# Patient Record
Sex: FEMALE | Race: WHITE | NOT HISPANIC OR LATINO | Employment: OTHER | RURAL
[De-identification: names, ages, dates, MRNs, and addresses within clinical notes are randomized per-mention and may not be internally consistent; named-entity substitution may affect disease eponyms.]

---

## 2017-01-20 ENCOUNTER — OFFICE VISIT (OUTPATIENT)
Dept: FAMILY MEDICINE CLINIC | Facility: CLINIC | Age: 67
End: 2017-01-20

## 2017-01-20 VITALS
HEIGHT: 66 IN | DIASTOLIC BLOOD PRESSURE: 90 MMHG | HEART RATE: 87 BPM | OXYGEN SATURATION: 98 % | BODY MASS INDEX: 25.65 KG/M2 | SYSTOLIC BLOOD PRESSURE: 155 MMHG | WEIGHT: 159.6 LBS | TEMPERATURE: 99.6 F

## 2017-01-20 DIAGNOSIS — J06.9 ACUTE URI: Primary | ICD-10-CM

## 2017-01-20 PROCEDURE — 99213 OFFICE O/P EST LOW 20 MIN: CPT | Performed by: FAMILY MEDICINE

## 2017-01-20 PROCEDURE — 96372 THER/PROPH/DIAG INJ SC/IM: CPT | Performed by: FAMILY MEDICINE

## 2017-01-20 RX ORDER — ALBUTEROL SULFATE 90 UG/1
2 AEROSOL, METERED RESPIRATORY (INHALATION) EVERY 4 HOURS PRN
Qty: 3.7 G | Refills: 2 | Status: SHIPPED | OUTPATIENT
Start: 2017-01-20 | End: 2018-03-18 | Stop reason: SDUPTHER

## 2017-01-20 RX ORDER — FLUTICASONE PROPIONATE 50 MCG
2 SPRAY, SUSPENSION (ML) NASAL DAILY
COMMUNITY
End: 2017-06-19 | Stop reason: SDUPTHER

## 2017-01-20 RX ORDER — PRAVASTATIN SODIUM 40 MG
TABLET ORAL
COMMUNITY
Start: 2017-01-11 | End: 2017-06-19 | Stop reason: SDUPTHER

## 2017-01-20 RX ORDER — ALENDRONATE SODIUM 70 MG/1
TABLET ORAL
COMMUNITY
Start: 2017-01-11 | End: 2018-03-29

## 2017-01-20 RX ORDER — LEVOFLOXACIN 500 MG/1
500 TABLET, FILM COATED ORAL DAILY
Qty: 7 TABLET | Refills: 0 | Status: SHIPPED | OUTPATIENT
Start: 2017-01-20 | End: 2017-01-27

## 2017-01-20 RX ORDER — ASPIRIN 81 MG/1
81 TABLET ORAL DAILY
COMMUNITY
End: 2018-10-17

## 2017-01-20 RX ORDER — LEVOTHYROXINE SODIUM 0.05 MG/1
TABLET ORAL
COMMUNITY
Start: 2017-01-11 | End: 2017-06-19 | Stop reason: SDUPTHER

## 2017-01-20 RX ORDER — LOSARTAN POTASSIUM 100 MG/1
TABLET ORAL
COMMUNITY
Start: 2017-01-11 | End: 2017-06-19 | Stop reason: SDUPTHER

## 2017-01-20 RX ORDER — METHYLPREDNISOLONE ACETATE 40 MG/ML
20 INJECTION, SUSPENSION INTRA-ARTICULAR; INTRALESIONAL; INTRAMUSCULAR; SOFT TISSUE ONCE
Status: COMPLETED | OUTPATIENT
Start: 2017-01-20 | End: 2017-01-20

## 2017-01-20 RX ORDER — AMLODIPINE BESYLATE 10 MG/1
TABLET ORAL
COMMUNITY
Start: 2017-01-11 | End: 2017-06-19 | Stop reason: SDUPTHER

## 2017-01-20 RX ORDER — CETIRIZINE HYDROCHLORIDE 10 MG/1
10 TABLET ORAL DAILY
COMMUNITY
End: 2017-10-11

## 2017-01-20 RX ORDER — INDAPAMIDE 2.5 MG/1
TABLET, FILM COATED ORAL
COMMUNITY
Start: 2017-01-11 | End: 2017-06-19 | Stop reason: SDUPTHER

## 2017-01-20 RX ADMIN — METHYLPREDNISOLONE ACETATE 20 MG: 40 INJECTION, SUSPENSION INTRA-ARTICULAR; INTRALESIONAL; INTRAMUSCULAR; SOFT TISSUE at 09:33

## 2017-01-20 NOTE — PROGRESS NOTES
"Naina Nigel    1950    Chief Complaint   Patient presents with   • URI       Vitals:    01/20/17 0912   BP: 155/90   Pulse: 87   Temp: 99.6 °F (37.6 °C)   SpO2: 98%   Weight: 159 lb 9.6 oz (72.4 kg)   Height: 66\" (167.6 cm)       URI    This is a new problem. The current episode started in the past 7 days. The problem has been gradually worsening. The maximum temperature recorded prior to her arrival was 100.4 - 100.9 F. Associated symptoms include congestion, coughing and sinus pain. She has tried acetaminophen and antihistamine for the symptoms. The treatment provided no relief.       Review of Systems   HENT: Positive for congestion.    Respiratory: Positive for cough.        Past Medical History   Diagnosis Date   • Hyperlipidemia    • Hypertension    • Hypothyroidism          Current Outpatient Prescriptions:   •  alendronate (FOSAMAX) 70 MG tablet, , Disp: , Rfl:   •  amLODIPine (NORVASC) 10 MG tablet, , Disp: , Rfl:   •  aspirin 81 MG EC tablet, Take 81 mg by mouth Daily., Disp: , Rfl:   •  cetirizine (zyrTEC) 10 MG tablet, Take 10 mg by mouth Daily., Disp: , Rfl:   •  Cholecalciferol (VITAMIN D3) 5000 UNITS capsule capsule, Take 5,000 Units by mouth Daily., Disp: , Rfl:   •  fluticasone (FLONASE) 50 MCG/ACT nasal spray, 2 sprays into each nostril Daily., Disp: , Rfl:   •  indapamide (LOZOL) 2.5 MG tablet, , Disp: , Rfl:   •  levothyroxine (SYNTHROID, LEVOTHROID) 50 MCG tablet, , Disp: , Rfl:   •  losartan (COZAAR) 100 MG tablet, , Disp: , Rfl:   •  Multiple Vitamins-Minerals (MULTIVITAMIN ADULTS 50+ PO), Take  by mouth., Disp: , Rfl:   •  pravastatin (PRAVACHOL) 40 MG tablet, , Disp: , Rfl:   •  levoFLOXacin (LEVAQUIN) 500 MG tablet, Take 1 tablet by mouth Daily for 7 days., Disp: 7 tablet, Rfl: 0    Current Facility-Administered Medications:   •  methylPREDNISolone acetate (DEPO-medrol) injection 20 mg, 20 mg, Intramuscular, Once, Juan Luis Zafar MD    Allergies   Allergen Reactions   • " "Penicillins    • Prednisone        There is no problem list on file for this patient.      Social History     Social History   • Marital status:      Spouse name: N/A   • Number of children: N/A   • Years of education: N/A     Social History Main Topics   • Smoking status: Never Smoker   • Smokeless tobacco: Never Used   • Alcohol use Yes      Comment: occ   • Drug use: No   • Sexual activity: Defer     Other Topics Concern   • None     Social History Narrative       Past Surgical History   Procedure Laterality Date   • [place]     [facility] [place]   • Tubal abdominal ligation     • Eye surgery         Physical Exam   Constitutional: She appears well-developed and well-nourished.   HENT:   Right Ear: External ear normal.   Left Ear: External ear normal.   Mouth/Throat: Oropharynx is clear and moist.   Eyes: Conjunctivae are normal.   Cardiovascular: Normal rate and regular rhythm.    Pulmonary/Chest: Effort normal. She has no wheezes.   Lymphadenopathy:     She has no cervical adenopathy.   Neurological: She is alert.   Psychiatric: She has a normal mood and affect.   Vitals reviewed.      Vitals:    01/20/17 0912   BP: 155/90   Pulse: 87   Temp: 99.6 °F (37.6 °C)   SpO2: 98%   Weight: 159 lb 9.6 oz (72.4 kg)   Height: 66\" (167.6 cm)       Naina was seen today for uri.    Diagnoses and all orders for this visit:    Acute URI  -     methylPREDNISolone acetate (DEPO-medrol) injection 20 mg; Inject 0.5 mL into the shoulder, thigh, or buttocks 1 (One) Time.    Other orders  -     levoFLOXacin (LEVAQUIN) 500 MG tablet; Take 1 tablet by mouth Daily for 7 days.        Problem List Items Addressed This Visit     None      Visit Diagnoses     Acute URI    -  Primary    Relevant Medications    methylPREDNISolone acetate (DEPO-medrol) injection 20 mg (Start on 1/20/2017 10:15 AM)    levoFLOXacin (LEVAQUIN) 500 MG tablet       plan finish Z-Jeffery from ambulatory clinic-Floxin for 7 days-albuterol " inhaler                            Juan Luis Zafar MD  1/20/2017

## 2017-01-20 NOTE — MR AVS SNAPSHOT
Naina Manning   1/20/2017 9:15 AM   Office Visit    Dept Phone:  [phone]   Encounter #:  [id number]    Provider:  Juan Luis Zafar MD   Department:  [facility]                Your Full Care Plan              Today's Medication Changes          These changes are accurate as of: 1/20/17  9:33 AM.  If you have any questions, ask your nurse or doctor.               New Medication(s)Ordered:     levoFLOXacin 500 MG tablet   Commonly known as:  LEVAQUIN   Take 1 tablet by mouth Daily for 7 days.   Started by:  Juan Luis Zafar MD            Where to Get Your Medications      These medications were sent to [facility] [address] [place] [address] [phone] [facility] [phone] [address] [address]     Phone:  [phone]     levoFLOXacin 500 MG tablet                  Your Updated Medication List          This list is accurate as of: 1/20/17  9:33 AM.  Always use your most recent med list.                alendronate 70 MG tablet   Commonly known as:  FOSAMAX       amLODIPine 10 MG tablet   Commonly known as:  NORVASC       aspirin 81 MG EC tablet       cetirizine 10 MG tablet   Commonly known as:  zyrTEC       fluticasone 50 MCG/ACT nasal spray   Commonly known as:  FLONASE       indapamide 2.5 MG tablet   Commonly known as:  LOZOL       levoFLOXacin 500 MG tablet   Commonly known as:  LEVAQUIN   Take 1 tablet by mouth Daily for 7 days.       levothyroxine 50 MCG tablet   Commonly known as:  SYNTHROID, LEVOTHROID       losartan 100 MG tablet   Commonly known as:  COZAAR       MULTIVITAMIN ADULTS 50+ PO       pravastatin 40 MG tablet   Commonly known as:  PRAVACHOL       vitamin D3 5000 UNITS capsule capsule               You Were Diagnosed With        Codes Comments    Acute URI    -  Primary ICD-10-CM: J06.9  ICD-9-CM: 465.9       Medications to be Given to You by a Medical Professional     Due       "Frequency    1/20/2017 methylPREDNISolone acetate (DEPO-medrol) injection 20 mg  Once      Instructions     None    Patient Instructions History      Upcoming Appointments     Visit Type Date Time Department    OFFICE VISIT 1/20/2017  9:15 AM [facility] [place]      MSIhart Signup     Our records indicate that you have declined [facility] MSIhart signup. If you would like to sign up for [employer], please email [email or web address] or call [phone] to obtain an activation code.             Other Info from Your Visit           Allergies     Penicillins      Prednisone        Reason for Visit     URI           Vital Signs     Blood Pressure Pulse Temperature Height Weight Oxygen Saturation    155/90 87 99.6 °F (37.6 °C) 66\" (167.6 cm) 159 lb 9.6 oz (72.4 kg) 98%    Body Mass Index Smoking Status                25.76 kg/m2 Never Smoker          Problems and Diagnoses Noted     Acute upper respiratory infection    -  Primary        "

## 2017-02-06 RX ORDER — DIPHENHYDRAMINE, LIDOCAINE, NYSTATIN
5 KIT ORAL 3 TIMES DAILY
Qty: 60 ML | Refills: 0 | Status: SHIPPED | OUTPATIENT
Start: 2017-02-06 | End: 2017-04-21 | Stop reason: SDUPTHER

## 2017-02-13 ENCOUNTER — OFFICE VISIT (OUTPATIENT)
Dept: FAMILY MEDICINE CLINIC | Facility: CLINIC | Age: 67
End: 2017-02-13

## 2017-02-13 VITALS
BODY MASS INDEX: 25.46 KG/M2 | HEART RATE: 81 BPM | TEMPERATURE: 98.7 F | SYSTOLIC BLOOD PRESSURE: 124 MMHG | OXYGEN SATURATION: 97 % | WEIGHT: 158.4 LBS | DIASTOLIC BLOOD PRESSURE: 80 MMHG | HEIGHT: 66 IN

## 2017-02-13 DIAGNOSIS — J06.9 ACUTE URI: Primary | ICD-10-CM

## 2017-02-13 DIAGNOSIS — R73.9 HYPERGLYCEMIA: ICD-10-CM

## 2017-02-13 DIAGNOSIS — J06.9 UPPER RESPIRATORY TRACT INFECTION, UNSPECIFIED TYPE: ICD-10-CM

## 2017-02-13 PROCEDURE — 99212 OFFICE O/P EST SF 10 MIN: CPT | Performed by: FAMILY MEDICINE

## 2017-02-13 NOTE — PROGRESS NOTES
"Naina Manning    1950    Chief Complaint   Patient presents with   • Thrush       Vitals:    02/13/17 1015   BP: 124/80   Pulse: 81   Temp: 98.7 °F (37.1 °C)   SpO2: 97%   Weight: 158 lb 6.4 oz (71.8 kg)   Height: 66\" (167.6 cm)       URI    This is a new problem. The current episode started 1 to 4 weeks ago. The problem has been gradually improving. There has been no fever. Associated symptoms include a sore throat. Treatments tried: Persistent thrush-antifungal treatment completed.       Review of Systems   HENT: Positive for sore throat.         Sore tongue, consistent with thrush       Past Medical History   Diagnosis Date   • Hyperlipidemia    • Hypertension    • Hypothyroidism          Current Outpatient Prescriptions:   •  albuterol (PROVENTIL HFA;VENTOLIN HFA) 108 (90 BASE) MCG/ACT inhaler, Inhale 2 puffs Every 4 (Four) Hours As Needed for wheezing., Disp: 3.7 g, Rfl: 2  •  alendronate (FOSAMAX) 70 MG tablet, , Disp: , Rfl:   •  amLODIPine (NORVASC) 10 MG tablet, , Disp: , Rfl:   •  aspirin 81 MG EC tablet, Take 81 mg by mouth Daily., Disp: , Rfl:   •  cetirizine (zyrTEC) 10 MG tablet, Take 10 mg by mouth Daily., Disp: , Rfl:   •  Cholecalciferol (VITAMIN D3) 5000 UNITS capsule capsule, Take 5,000 Units by mouth Daily., Disp: , Rfl:   •  fluticasone (FLONASE) 50 MCG/ACT nasal spray, 2 sprays into each nostril Daily., Disp: , Rfl:   •  indapamide (LOZOL) 2.5 MG tablet, , Disp: , Rfl:   •  levothyroxine (SYNTHROID, LEVOTHROID) 50 MCG tablet, , Disp: , Rfl:   •  losartan (COZAAR) 100 MG tablet, , Disp: , Rfl:   •  Multiple Vitamins-Minerals (MULTIVITAMIN ADULTS 50+ PO), Take  by mouth., Disp: , Rfl:   •  nystatin susp + lidocaine viscous (MAGIC MOUTHWASH) oral suspension, Swish and swallow 5 mL 3 (Three) Times a Day., Disp: 60 mL, Rfl: 0  •  pravastatin (PRAVACHOL) 40 MG tablet, , Disp: , Rfl:     Allergies   Allergen Reactions   • Sulfa Antibiotics Hives   • Penicillins    • Prednisone        There is no " "problem list on file for this patient.      Social History     Social History   • Marital status:      Spouse name: N/A   • Number of children: N/A   • Years of education: N/A     Social History Main Topics   • Smoking status: Never Smoker   • Smokeless tobacco: Never Used   • Alcohol use Yes      Comment: occ   • Drug use: No   • Sexual activity: Defer     Other Topics Concern   • None     Social History Narrative       Past Surgical History   Procedure Laterality Date   • Colonoscopy  10/04/2016     [facility] [place] KING   • Tubal abdominal ligation     • Eye surgery         Physical Exam   Constitutional: She appears well-developed and well-nourished.   HENT:   Right Ear: External ear normal.   Left Ear: External ear normal.   Tongue consistent with thrush with minimal cervical adenopathy on the right   Vitals reviewed.      Vitals:    02/13/17 1015   BP: 124/80   Pulse: 81   Temp: 98.7 °F (37.1 °C)   SpO2: 97%   Weight: 158 lb 6.4 oz (71.8 kg)   Height: 66\" (167.6 cm)       Naina was seen today for thrush.    Diagnoses and all orders for this visit:    Acute URI    Hyperglycemia  -     Basic Metabolic Panel  -     Hemoglobin A1c    Upper respiratory tract infection, unspecified type  -     CBC & Differential        Problem List Items Addressed This Visit     None      Visit Diagnoses     Acute URI    -  Primary    Hyperglycemia        Relevant Orders    Basic Metabolic Panel    Hemoglobin A1c    Upper respiratory tract infection, unspecified type        Relevant Orders    CBC & Differential              Plan Biotene mouthwash, hydration, stimulate salivation                        Juan Luis Zafar MD  2/13/2017  "

## 2017-02-14 LAB
BASOPHILS # BLD AUTO: 0.04 10*3/MM3 (ref 0–0.2)
BASOPHILS NFR BLD AUTO: 0.5 % (ref 0–2)
BUN SERPL-MCNC: 17 MG/DL (ref 5–21)
BUN/CREAT SERPL: 23.9 (ref 7–25)
CALCIUM SERPL-MCNC: 10.1 MG/DL (ref 8.4–10.4)
CHLORIDE SERPL-SCNC: 96 MMOL/L (ref 98–110)
CO2 SERPL-SCNC: 31 MMOL/L (ref 24–31)
CREAT SERPL-MCNC: 0.71 MG/DL (ref 0.5–1.4)
EOSINOPHIL # BLD AUTO: 0.19 10*3/MM3 (ref 0–0.7)
EOSINOPHIL NFR BLD AUTO: 2.2 % (ref 0–4)
ERYTHROCYTE [DISTWIDTH] IN BLOOD BY AUTOMATED COUNT: 13.3 % (ref 12–15)
GLUCOSE SERPL-MCNC: 101 MG/DL (ref 70–100)
HBA1C MFR BLD: 5.4 %
HCT VFR BLD AUTO: 45.5 % (ref 37–47)
HGB BLD-MCNC: 15.3 G/DL (ref 12–16)
IMM GRANULOCYTES # BLD: 0.02 10*3/MM3 (ref 0–0.03)
IMM GRANULOCYTES NFR BLD: 0.2 % (ref 0–5)
LYMPHOCYTES # BLD AUTO: 2.34 10*3/MM3 (ref 0.72–4.86)
LYMPHOCYTES NFR BLD AUTO: 26.5 % (ref 15–45)
MCH RBC QN AUTO: 29.8 PG (ref 28–32)
MCHC RBC AUTO-ENTMCNC: 33.6 G/DL (ref 33–36)
MCV RBC AUTO: 88.5 FL (ref 82–98)
MONOCYTES # BLD AUTO: 0.56 10*3/MM3 (ref 0.19–1.3)
MONOCYTES NFR BLD AUTO: 6.3 % (ref 4–12)
NEUTROPHILS # BLD AUTO: 5.68 10*3/MM3 (ref 1.87–8.4)
NEUTROPHILS NFR BLD AUTO: 64.3 % (ref 39–78)
PLATELET # BLD AUTO: 394 10*3/MM3 (ref 130–400)
POTASSIUM SERPL-SCNC: 4.7 MMOL/L (ref 3.5–5.3)
RBC # BLD AUTO: 5.14 10*6/MM3 (ref 4.2–5.4)
SODIUM SERPL-SCNC: 139 MMOL/L (ref 135–145)
WBC # BLD AUTO: 8.83 10*3/MM3 (ref 4.8–10.8)

## 2017-03-06 ENCOUNTER — OFFICE VISIT (OUTPATIENT)
Dept: FAMILY MEDICINE CLINIC | Facility: CLINIC | Age: 67
End: 2017-03-06

## 2017-03-06 VITALS
HEART RATE: 77 BPM | BODY MASS INDEX: 25.43 KG/M2 | HEIGHT: 66 IN | TEMPERATURE: 98.9 F | SYSTOLIC BLOOD PRESSURE: 140 MMHG | WEIGHT: 158.2 LBS | OXYGEN SATURATION: 99 % | DIASTOLIC BLOOD PRESSURE: 80 MMHG

## 2017-03-06 DIAGNOSIS — R10.13 EPIGASTRIC PAIN: Primary | ICD-10-CM

## 2017-03-06 LAB
BILIRUB BLD-MCNC: NEGATIVE MG/DL
CLARITY, POC: CLEAR
COLOR UR: YELLOW
GLUCOSE UR STRIP-MCNC: NEGATIVE MG/DL
KETONES UR QL: NEGATIVE
LEUKOCYTE EST, POC: NEGATIVE
NITRITE UR-MCNC: NEGATIVE MG/ML
PH UR: 6 [PH] (ref 5–8)
PROT UR STRIP-MCNC: NEGATIVE MG/DL
RBC # UR STRIP: NEGATIVE /UL
SP GR UR: 1.02 (ref 1–1.03)
UROBILINOGEN UR QL: NORMAL

## 2017-03-06 PROCEDURE — 81003 URINALYSIS AUTO W/O SCOPE: CPT | Performed by: FAMILY MEDICINE

## 2017-03-06 PROCEDURE — 99213 OFFICE O/P EST LOW 20 MIN: CPT | Performed by: FAMILY MEDICINE

## 2017-03-06 RX ORDER — OMEPRAZOLE 40 MG/1
40 CAPSULE, DELAYED RELEASE ORAL DAILY
Qty: 30 CAPSULE | Refills: 2 | Status: SHIPPED | OUTPATIENT
Start: 2017-03-06 | End: 2017-06-19 | Stop reason: SDUPTHER

## 2017-03-06 NOTE — PROGRESS NOTES
"Naina Nigel    1950    Chief Complaint   Patient presents with   • Abdominal Pain     Patient stated possible ulcer       Vitals:    03/06/17 0955   BP: 140/80   Pulse: 77   Temp: 98.9 °F (37.2 °C)   SpO2: 99%   Weight: 158 lb 3.2 oz (71.8 kg)   Height: 66\" (167.6 cm)       Abdominal Pain   This is a new problem. The current episode started 1 to 4 weeks ago. The onset quality is gradual. The problem occurs daily. The problem has been unchanged. The pain is located in the epigastric region. The pain is at a severity of 2/10. The pain is mild. The quality of the pain is burning. The abdominal pain does not radiate. Associated symptoms include belching. The pain is aggravated by eating. The pain is relieved by nothing. She has tried nothing for the symptoms.       Review of Systems   Cardiovascular: Negative for chest pain.   Gastrointestinal: Positive for abdominal pain.       Past Medical History   Diagnosis Date   • Hyperlipidemia    • Hypertension    • Hypothyroidism          Current Outpatient Prescriptions:   •  albuterol (PROVENTIL HFA;VENTOLIN HFA) 108 (90 BASE) MCG/ACT inhaler, Inhale 2 puffs Every 4 (Four) Hours As Needed for wheezing., Disp: 3.7 g, Rfl: 2  •  alendronate (FOSAMAX) 70 MG tablet, , Disp: , Rfl:   •  amLODIPine (NORVASC) 10 MG tablet, , Disp: , Rfl:   •  aspirin 81 MG EC tablet, Take 81 mg by mouth Daily., Disp: , Rfl:   •  cetirizine (zyrTEC) 10 MG tablet, Take 10 mg by mouth Daily., Disp: , Rfl:   •  Cholecalciferol (VITAMIN D3) 5000 UNITS capsule capsule, Take 5,000 Units by mouth Daily., Disp: , Rfl:   •  fluticasone (FLONASE) 50 MCG/ACT nasal spray, 2 sprays into each nostril Daily., Disp: , Rfl:   •  indapamide (LOZOL) 2.5 MG tablet, , Disp: , Rfl:   •  levothyroxine (SYNTHROID, LEVOTHROID) 50 MCG tablet, , Disp: , Rfl:   •  losartan (COZAAR) 100 MG tablet, , Disp: , Rfl:   •  Multiple Vitamins-Minerals (MULTIVITAMIN ADULTS 50+ PO), Take  by mouth., Disp: , Rfl:   •  nystatin susp + " "lidocaine viscous (MAGIC MOUTHWASH) oral suspension, Swish and swallow 5 mL 3 (Three) Times a Day., Disp: 60 mL, Rfl: 0  •  pravastatin (PRAVACHOL) 40 MG tablet, , Disp: , Rfl:     Allergies   Allergen Reactions   • Sulfa Antibiotics Hives   • Penicillins    • Prednisone        There is no problem list on file for this patient.      Social History     Social History   • Marital status:      Spouse name: N/A   • Number of children: N/A   • Years of education: N/A     Social History Main Topics   • Smoking status: Never Smoker   • Smokeless tobacco: Never Used   • Alcohol use Yes      Comment: occ   • Drug use: No   • Sexual activity: Defer     Other Topics Concern   • None     Social History Narrative       Past Surgical History   Procedure Laterality Date   • Colonoscopy  10/04/2016     [facility] [place] DR KING   • Tubal abdominal ligation     • Eye surgery         Physical Exam   Constitutional: She is oriented to person, place, and time. She appears well-developed and well-nourished.   Cardiovascular: Normal rate and regular rhythm.    Pulmonary/Chest: Effort normal and breath sounds normal.   Abdominal: Soft. Bowel sounds are normal. She exhibits no distension. There is no tenderness.   Neurological: She is alert and oriented to person, place, and time.   Psychiatric: She has a normal mood and affect. Her behavior is normal.   Vitals reviewed.      Vitals:    03/06/17 0955   BP: 140/80   Pulse: 77   Temp: 98.9 °F (37.2 °C)   SpO2: 99%   Weight: 158 lb 3.2 oz (71.8 kg)   Height: 66\" (167.6 cm)       Naina was seen today for abdominal pain.    Diagnoses and all orders for this visit:    Epigastric pain  -     POCT urinalysis dipstick, automated        Problem List Items Addressed This Visit     None      Visit Diagnoses     Epigastric pain    -  Primary    Relevant Orders    POCT urinalysis dipstick, automated (Completed)              Plan-omeprazole, Gaviscon-if not better in 3 weeks will need " investigation                    Juan Luis Zafar MD  3/6/2017

## 2017-03-21 ENCOUNTER — OFFICE VISIT (OUTPATIENT)
Dept: FAMILY MEDICINE CLINIC | Facility: CLINIC | Age: 67
End: 2017-03-21

## 2017-03-21 VITALS
SYSTOLIC BLOOD PRESSURE: 150 MMHG | DIASTOLIC BLOOD PRESSURE: 82 MMHG | BODY MASS INDEX: 25.94 KG/M2 | HEIGHT: 66 IN | WEIGHT: 161.4 LBS

## 2017-03-21 DIAGNOSIS — I10 ESSENTIAL HYPERTENSION: Primary | ICD-10-CM

## 2017-03-21 PROCEDURE — 99213 OFFICE O/P EST LOW 20 MIN: CPT | Performed by: FAMILY MEDICINE

## 2017-03-21 RX ORDER — CLONIDINE HYDROCHLORIDE 0.1 MG/1
TABLET ORAL
Qty: 90 TABLET | Refills: 3 | Status: SHIPPED | OUTPATIENT
Start: 2017-03-21 | End: 2017-05-25 | Stop reason: ALTCHOICE

## 2017-03-21 NOTE — PROGRESS NOTES
"Naina Manning    1950    Chief Complaint   Patient presents with   • Hypertension       Vitals:    03/21/17 1237   BP: 150/82   Weight: 161 lb 6.4 oz (73.2 kg)   Height: 66\" (167.6 cm)       Hypertension   This is a chronic problem. The current episode started more than 1 year ago. The problem has been gradually worsening since onset. The problem is uncontrolled. Pertinent negatives include no chest pain or shortness of breath. There are no associated agents to hypertension. Risk factors for coronary artery disease include dyslipidemia, post-menopausal state and family history. Past treatments include angiotensin blockers, calcium channel blockers and diuretics. The current treatment provides moderate improvement. There are no compliance problems.        Review of Systems   Respiratory: Negative for chest tightness and shortness of breath.    Cardiovascular: Negative for chest pain and leg swelling.       Past Medical History   Diagnosis Date   • Hyperlipidemia    • Hypertension    • Hypothyroidism          Current Outpatient Prescriptions:   •  albuterol (PROVENTIL HFA;VENTOLIN HFA) 108 (90 BASE) MCG/ACT inhaler, Inhale 2 puffs Every 4 (Four) Hours As Needed for wheezing., Disp: 3.7 g, Rfl: 2  •  alendronate (FOSAMAX) 70 MG tablet, , Disp: , Rfl:   •  amLODIPine (NORVASC) 10 MG tablet, , Disp: , Rfl:   •  aspirin 81 MG EC tablet, Take 81 mg by mouth Daily., Disp: , Rfl:   •  cetirizine (zyrTEC) 10 MG tablet, Take 10 mg by mouth Daily., Disp: , Rfl:   •  Cholecalciferol (VITAMIN D3) 5000 UNITS capsule capsule, Take 5,000 Units by mouth Daily., Disp: , Rfl:   •  fluticasone (FLONASE) 50 MCG/ACT nasal spray, 2 sprays into each nostril Daily., Disp: , Rfl:   •  indapamide (LOZOL) 2.5 MG tablet, , Disp: , Rfl:   •  levothyroxine (SYNTHROID, LEVOTHROID) 50 MCG tablet, , Disp: , Rfl:   •  losartan (COZAAR) 100 MG tablet, , Disp: , Rfl:   •  Multiple Vitamins-Minerals (MULTIVITAMIN ADULTS 50+ PO), Take  by mouth., " "Disp: , Rfl:   •  nystatin susp + lidocaine viscous (MAGIC MOUTHWASH) oral suspension, Swish and swallow 5 mL 3 (Three) Times a Day., Disp: 60 mL, Rfl: 0  •  omeprazole (priLOSEC) 40 MG capsule, Take 1 capsule by mouth Daily., Disp: 30 capsule, Rfl: 2  •  pravastatin (PRAVACHOL) 40 MG tablet, , Disp: , Rfl:   •  CloNIDine (CATAPRES) 0.1 MG tablet, 1 at bedtime for blood pressure, Disp: 90 tablet, Rfl: 3    Allergies   Allergen Reactions   • Sulfa Antibiotics Hives   • Penicillins    • Prednisone        There is no problem list on file for this patient.      Social History     Social History   • Marital status:      Spouse name: N/A   • Number of children: N/A   • Years of education: N/A     Social History Main Topics   • Smoking status: Never Smoker   • Smokeless tobacco: Never Used   • Alcohol use Yes      Comment: occ   • Drug use: No   • Sexual activity: Defer     Other Topics Concern   • None     Social History Narrative       Past Surgical History   Procedure Laterality Date   • Colonoscopy  10/04/2016     [facility] [place] - DR KING   • Tubal abdominal ligation     • Eye surgery         Physical Exam   Constitutional: She appears well-developed and well-nourished.   Cardiovascular: Normal rate and regular rhythm.    Pulmonary/Chest: Effort normal and breath sounds normal. She has no wheezes.   Neurological: She is alert.   Psychiatric: She has a normal mood and affect.   Vitals reviewed.      Vitals:    03/21/17 1237   BP: 150/82   Weight: 161 lb 6.4 oz (73.2 kg)   Height: 66\" (167.6 cm)       Naina was seen today for hypertension.    Diagnoses and all orders for this visit:    Essential hypertension    Other orders  -     CloNIDine (CATAPRES) 0.1 MG tablet; 1 at bedtime for blood pressure        Problem List Items Addressed This Visit     None      Visit Diagnoses     Essential hypertension    -  Primary    Relevant Medications    CloNIDine (CATAPRES) 0.1 MG tablet                Plan-add clonidine 0.1 to blood " pressure medications return 2 weeks for annual physical                      Juan Luis Zafar MD  3/21/2017

## 2017-04-03 ENCOUNTER — OFFICE VISIT (OUTPATIENT)
Dept: FAMILY MEDICINE CLINIC | Facility: CLINIC | Age: 67
End: 2017-04-03

## 2017-04-03 VITALS
WEIGHT: 162.2 LBS | DIASTOLIC BLOOD PRESSURE: 80 MMHG | OXYGEN SATURATION: 96 % | HEIGHT: 66 IN | TEMPERATURE: 99.1 F | SYSTOLIC BLOOD PRESSURE: 112 MMHG | HEART RATE: 77 BPM | BODY MASS INDEX: 26.07 KG/M2

## 2017-04-03 DIAGNOSIS — Z72.89 OTHER PROBLEMS RELATED TO LIFESTYLE: ICD-10-CM

## 2017-04-03 DIAGNOSIS — R53.83 FATIGUE, UNSPECIFIED TYPE: ICD-10-CM

## 2017-04-03 DIAGNOSIS — E78.2 MIXED HYPERLIPIDEMIA: ICD-10-CM

## 2017-04-03 DIAGNOSIS — E55.9 UNSPECIFIED VITAMIN D DEFICIENCY: ICD-10-CM

## 2017-04-03 DIAGNOSIS — Z12.31 SCREENING MAMMOGRAM, ENCOUNTER FOR: ICD-10-CM

## 2017-04-03 DIAGNOSIS — Z00.00 ANNUAL PHYSICAL EXAM: Primary | ICD-10-CM

## 2017-04-03 DIAGNOSIS — I10 ESSENTIAL HYPERTENSION: ICD-10-CM

## 2017-04-03 DIAGNOSIS — Z12.4 SCREENING FOR MALIGNANT NEOPLASM OF CERVIX: ICD-10-CM

## 2017-04-03 DIAGNOSIS — Z12.12 SCREENING FOR MALIGNANT NEOPLASM OF THE RECTUM: ICD-10-CM

## 2017-04-03 LAB
BILIRUB BLD-MCNC: NEGATIVE MG/DL
CLARITY, POC: CLEAR
COLOR UR: YELLOW
GLUCOSE UR STRIP-MCNC: NEGATIVE MG/DL
KETONES UR QL: NEGATIVE
LEUKOCYTE EST, POC: NEGATIVE
NITRITE UR-MCNC: NEGATIVE MG/ML
PH UR: 7 [PH] (ref 5–8)
PROT UR STRIP-MCNC: NEGATIVE MG/DL
RBC # UR STRIP: NEGATIVE /UL
SP GR UR: 1.01 (ref 1–1.03)
UROBILINOGEN UR QL: NORMAL

## 2017-04-03 PROCEDURE — G0402 INITIAL PREVENTIVE EXAM: HCPCS | Performed by: FAMILY MEDICINE

## 2017-04-03 PROCEDURE — 81003 URINALYSIS AUTO W/O SCOPE: CPT | Performed by: FAMILY MEDICINE

## 2017-04-03 NOTE — PATIENT INSTRUCTIONS
Exercising to Stay Healthy  Exercising regularly is important. It has many health benefits, such as:  · Improving your overall fitness, flexibility, and endurance.  · Increasing your bone density.  · Helping with weight control.  · Decreasing your body fat.  · Increasing your muscle strength.  · Reducing stress and tension.  · Improving your overall health.  In order to become healthy and stay healthy, it is recommended that you do moderate-intensity and vigorous-intensity exercise. You can tell that you are exercising at a moderate intensity if you have a higher heart rate and faster breathing, but you are still able to hold a conversation. You can tell that you are exercising at a vigorous intensity if you are breathing much harder and faster and cannot hold a conversation while exercising.  HOW OFTEN SHOULD I EXERCISE?  Choose an activity that you enjoy and set realistic goals. Your health care provider can help you to make an activity plan that works for you. Exercise regularly as directed by your health care provider. This may include:   · Doing resistance training twice each week, such as:    Push-ups.    Sit-ups.    Lifting weights.    Using resistance bands.  · Doing a given intensity of exercise for a given amount of time. Choose from these options:    150 minutes of moderate-intensity exercise every week.    75 minutes of vigorous-intensity exercise every week.    A mix of moderate-intensity and vigorous-intensity exercise every week.  Children, pregnant women, people who are out of shape, people who are overweight, and older adults may need to consult a health care provider for individual recommendations. If you have any sort of medical condition, be sure to consult your health care provider before starting a new exercise program.   WHAT ARE SOME EXERCISE IDEAS?  Some moderate-intensity exercise ideas include:   · Walking at a rate of 1 mile in 15  minutes.  · Biking.  · Hiking.  · Golfing.  · Dancing.  Some vigorous-intensity exercise ideas include:   · Walking at a rate of at least 4.5 miles per hour.  · Jogging or running at a rate of 5 miles per hour.  · Biking at a rate of at least 10 miles per hour.  · Lap swimming.  · Roller-skating or in-line skating.  · Cross-country skiing.  · Vigorous competitive sports, such as football, basketball, and soccer.  · Jumping rope.  · Aerobic dancing.  WHAT ARE SOME EVERYDAY ACTIVITIES THAT CAN HELP ME TO GET EXERCISE?  · Yard work, such as:    Pushing a .    Raking and bagging leaves.  · Washing and waxing your car.  · Pushing a stroller.  · Shoveling snow.  · Gardening.  · Washing windows or floors.  HOW CAN I BE MORE ACTIVE IN MY DAY-TO-DAY ACTIVITIES?  · Use the stairs instead of the elevator.  · Take a walk during your lunch break.  · If you drive, park your car farther away from work or school.  · If you take public transportation, get off one stop early and walk the rest of the way.  · Make all of your phone calls while standing up and walking around.  · Get up, stretch, and walk around every 30 minutes throughout the day.  WHAT GUIDELINES SHOULD I FOLLOW WHILE EXERCISING?  · Do not exercise so much that you hurt yourself, feel dizzy, or get very short of breath.  · Consult your health care provider before starting a new exercise program.  · Wear comfortable clothes and shoes with good support.  · Drink plenty of water while you exercise to prevent dehydration or heat stroke. Body water is lost during exercise and must be replaced.  · Work out until you breathe faster and your heart beats faster.     This information is not intended to replace advice given to you by your health care provider. Make sure you discuss any questions you have with your health care provider.     Document Released: 01/20/2012 Document Revised: 01/08/2016 Document Reviewed: 05/21/2015  [employer] Patient Education  ©201 [employer]    Medicare Wellness  Personal Prevention Plan of Service     Date of Office Visit:  2017  Encounter Provider:  Juan Luis Zafar MD  Place of Service:  [facility]  Patient Name: Naina Manning  :  1950    As part of the Medicare Wellness portion of your visit today, we are providing you with this personalized preventive plan of services (PPPS). This plan is based upon recommendations of the [place] Preventive Services Task Force (USPSTF) and the Advisory Committee on Immunization Practices (ACIP).    This lists the preventive care services that should be considered, and provides dates of when you are due. Items listed as completed are up-to-date and do not require any further intervention.    Health Maintenance   Topic Date Due   • HEPATITIS C SCREENING  2016   • MAMMOGRAM  2016   • ZOSTER VACCINE  2016   • LIPID PANEL  2017   • MEDICARE ANNUAL WELLNESS  2016   • PAP SMEAR  2016   • PNEUMOCOCCAL VACCINES (65+ LOW/MEDIUM RISK) (1 of 2 - PCV13) 01/10/2017   • TDAP/TD VACCINES (2 - Td) 2023   • COLONOSCOPY  10/04/2026   • INFLUENZA VACCINE  Completed       No orders of the defined types were placed in this encounter.      Return in 6 months (on 10/3/2017).

## 2017-04-03 NOTE — PROGRESS NOTES
QUICK REFERENCE INFORMATION:  The ABCs of the Annual Wellness Visit    [facility]    HEALTH RISK ASSESSMENT    1950    Recent Hospitalizations:  No recent hospitalization(s)..      Current Medical Providers:  Patient Care Team:  Juan Luis Zafar MD as PCP - General (Family Medicine)      Smoking Status:  History   Smoking Status   • Never Smoker   Smokeless Tobacco   • Never Used       Alcohol Consumption:  History   Alcohol Use   • Yes     Comment: occ       Depression Screen:   PHQ-9 Depression Screening 4/3/2017   Little interest or pleasure in doing things 0   Feeling down, depressed, or hopeless 0   Trouble falling or staying asleep, or sleeping too much 0   Feeling tired or having little energy 0   Poor appetite or overeating 0   Feeling bad about yourself - or that you are a failure or have let yourself or your family down 0   Trouble concentrating on things, such as reading the newspaper or watching television 0   Moving or speaking so slowly that other people could have noticed. Or the opposite - being so fidgety or restless that you have been moving around a lot more than usual 0   Thoughts that you would be better off dead, or of hurting yourself in some way 0   PHQ-9 Total Score 0   If you checked off any problems, how difficult have these problems made it for you to do your work, take care of things at home, or get along with other people? Not difficult at all       Health Habits and Functional and Cognitive Screening:  Functional & Cognitive Status 4/3/2017   Do you have difficulty preparing food and eating? No   Do you have difficulty bathing yourself? No   Do you have difficulty getting dressed? No   Do you have difficulty using the toilet? No   Do you have difficulty moving around from place to place? No   In the past year have you fallen or experienced a near fall? No   Do you need help using the phone?  No   Are you deaf or do you have serious difficulty hearing?  No   Do  "you need help with transportation? No   Do you need help shopping? No   Do you need help preparing meals?  No   Do you need help with housework?  No   Do you need help with laundry? No   Do you need help taking your medications? No   Do you need help managing money? No   Do you have difficulty concentrating, remembering or making decisions? -   -- The Timed Up and Go (TUG) Test (CDC Version)                  - Testing directions adhered to:                                  1) Patients wears regular footwear and may use walking aid(s) if    needed.                                  2) Patient to sit back in standard arm chair and identify a line 10 feet    away from patient on floor.                                  3) Test time begins at \"Go\" and stops when patient reseated in arm    chair.                    - Instructions read aloud (and demonstrated as applicable) to patient:                                      When I say \"Go,\" I want you to:                                    1) Stand up from the chair.                                  2) Walk to the line on the floor (10 feet away) at your normal pace.                                  3) Turn.                                  4) Walk back to the chair at your normal pace.                                  5) Sit down again.                    - Result: 3                  - Observations during test:Normal gait          Health Habits  Current Diet: Well Balanced Diet  Dental Exam: Up to date  Eye Exam: Up to date  Exercise (times per week): 5 times per week  Current Exercise Activities Include: Cardiovasular Workout on Exercise Equipment        Does the patient have evidence of cognitive impairment? No    Asprin use counseling?yes      Recent Lab Results:  CMP:  Lab Results   Component Value Date     (H) 02/13/2017    BUN 17 02/13/2017    CREATININE 0.71 02/13/2017    EGFRIFNONA 83 02/13/2017    EGFRIFAFRI 100 02/13/2017    BCR 23.9 02/13/2017     " 02/13/2017    K 4.7 02/13/2017    CO2 31.0 02/13/2017    CALCIUM 10.1 02/13/2017     Lipid Panel:     LDL:     HbA1c:  Lab Results   Component Value Date    HGBA1C 5.40 02/13/2017     Urine Microalbumin:     Visual Acuity:  No exam data present    Age-appropriate Screening Schedule:  Refer to the list below for future screening recommendations based on patient's age, sex and/or medical conditions. Orders for these recommended tests are listed in the plan section. The patient has been provided with a written plan.    Health Maintenance   Topic Date Due   • MAMMOGRAM  11/07/2016   • ZOSTER VACCINE  11/07/2016   • LIPID PANEL  01/20/2017   • PAP SMEAR  11/07/2016   • PNEUMOCOCCAL VACCINES (65+ LOW/MEDIUM RISK) (1 of 2 - PCV13) 01/10/2017   • TDAP/TD VACCINES (2 - Td) 07/16/2023   • COLONOSCOPY  10/04/2026   • INFLUENZA VACCINE  Completed        Subjective   History of Present Illness    Naina Manning is a 67 y.o. female an established patient presenting for a Welcome to Medicare Visit.     The following portions of the patient's history were reviewed and updated as appropriate: allergies, current medications, past family history, past medical history, past social history, past surgical history and problem list.    Outpatient Medications Prior to Visit   Medication Sig Dispense Refill   • albuterol (PROVENTIL HFA;VENTOLIN HFA) 108 (90 BASE) MCG/ACT inhaler Inhale 2 puffs Every 4 (Four) Hours As Needed for wheezing. 3.7 g 2   • alendronate (FOSAMAX) 70 MG tablet      • amLODIPine (NORVASC) 10 MG tablet      • aspirin 81 MG EC tablet Take 81 mg by mouth Daily.     • cetirizine (zyrTEC) 10 MG tablet Take 10 mg by mouth Daily.     • Cholecalciferol (VITAMIN D3) 5000 UNITS capsule capsule Take 5,000 Units by mouth Daily.     • CloNIDine (CATAPRES) 0.1 MG tablet 1 at bedtime for blood pressure 90 tablet 3   • fluticasone (FLONASE) 50 MCG/ACT nasal spray 2 sprays into each nostril Daily.     • indapamide (LOZOL) 2.5 MG tablet       • levothyroxine (SYNTHROID, LEVOTHROID) 50 MCG tablet      • losartan (COZAAR) 100 MG tablet      • Multiple Vitamins-Minerals (MULTIVITAMIN ADULTS 50+ PO) Take  by mouth.     • nystatin susp + lidocaine viscous (MAGIC MOUTHWASH) oral suspension Swish and swallow 5 mL 3 (Three) Times a Day. 60 mL 0   • omeprazole (priLOSEC) 40 MG capsule Take 1 capsule by mouth Daily. 30 capsule 2   • pravastatin (PRAVACHOL) 40 MG tablet        No facility-administered medications prior to visit.        There is no problem list on file for this patient.      Advanced Care Planning:  has an advanced directive - a copy HAS NOT been provided    Identification of Risk Factors:  Risk factors include: weight , unhealthy diet and cardiovascular risk.    Review of Systems   Constitutional: Negative.    HENT: Negative.    Eyes: Negative.    Respiratory: Negative for chest tightness and shortness of breath.    Cardiovascular: Negative for chest pain.   Gastrointestinal: Negative.         Colonoscopy 2016   Endocrine: Negative.    Musculoskeletal: Negative.    Skin: Negative.    Neurological: Negative.    Hematological: Negative.    Psychiatric/Behavioral: Negative.        Compared to one year ago, the patient feels her physical health is the same.  Compared to one year ago, the patient feels her mental health is the same.    Objective    Physical Exam   Constitutional: She is oriented to person, place, and time. She appears well-developed and well-nourished.   HENT:   Head: Normocephalic.   Right Ear: External ear normal.   Left Ear: External ear normal.   Mouth/Throat: Oropharynx is clear and moist.   Eyes: EOM are normal. Pupils are equal, round, and reactive to light.   Neck: Normal range of motion. Neck supple. No thyromegaly present.   Cardiovascular: Normal rate and regular rhythm.    Pulmonary/Chest: Effort normal and breath sounds normal. She has no wheezes. She has no rales.   Abdominal: Soft. Bowel sounds are normal.   Spleen and  "liver not felt   Genitourinary: Vagina normal and uterus normal.   Genitourinary Comments: Breasts no masses noted-pelvic uterus normal size no adnexal masses Pap smear taken   Musculoskeletal: Normal range of motion.   Lymphadenopathy:     She has no cervical adenopathy.   Neurological: She is alert and oriented to person, place, and time.   Skin: Skin is warm and dry.   Psychiatric: She has a normal mood and affect. Her behavior is normal. Thought content normal.   Vitals reviewed.      Vitals:    04/03/17 0948   BP: 112/80   Pulse: 77   Temp: 99.1 °F (37.3 °C)   SpO2: 96%   Weight: 162 lb 3.2 oz (73.6 kg)   Height: 66\" (167.6 cm)   PainSc: 0-No pain       Body mass index is 26.18 kg/(m^2).  Discussed the patient's BMI with her. The BMI is in the acceptable range.    Procedure   Procedures       Assessment/Plan   Patient Self-Management and Personalized Health Advice  The patient has been provided with information about: diet, exercise and weight management and preventive services including:   · Colorectal cancer screening, fecal occult blood test, Screening mammography, referral placed.    Visit Diagnoses:  No diagnosis found.    No orders of the defined types were placed in this encounter.      Outpatient Encounter Prescriptions as of 4/3/2017   Medication Sig Dispense Refill   • albuterol (PROVENTIL HFA;VENTOLIN HFA) 108 (90 BASE) MCG/ACT inhaler Inhale 2 puffs Every 4 (Four) Hours As Needed for wheezing. 3.7 g 2   • alendronate (FOSAMAX) 70 MG tablet      • amLODIPine (NORVASC) 10 MG tablet      • aspirin 81 MG EC tablet Take 81 mg by mouth Daily.     • cetirizine (zyrTEC) 10 MG tablet Take 10 mg by mouth Daily.     • Cholecalciferol (VITAMIN D3) 5000 UNITS capsule capsule Take 5,000 Units by mouth Daily.     • CloNIDine (CATAPRES) 0.1 MG tablet 1 at bedtime for blood pressure 90 tablet 3   • fluticasone (FLONASE) 50 MCG/ACT nasal spray 2 sprays into each nostril Daily.     • indapamide (LOZOL) 2.5 MG tablet   "    • levothyroxine (SYNTHROID, LEVOTHROID) 50 MCG tablet      • losartan (COZAAR) 100 MG tablet      • Multiple Vitamins-Minerals (MULTIVITAMIN ADULTS 50+ PO) Take  by mouth.     • nystatin susp + lidocaine viscous (MAGIC MOUTHWASH) oral suspension Swish and swallow 5 mL 3 (Three) Times a Day. 60 mL 0   • omeprazole (priLOSEC) 40 MG capsule Take 1 capsule by mouth Daily. 30 capsule 2   • pravastatin (PRAVACHOL) 40 MG tablet        No facility-administered encounter medications on file as of 4/3/2017.        Reviewed use of high risk medication in the elderly: yes  Reviewed for potential of harmful drug interactions in the elderly: yes    Follow Up:  No Follow-up on file.     An After Visit Summary and PPPS with all of these plans were given to the patient.   Plan above plus mammogram cardiology consult

## 2017-04-04 ENCOUNTER — TELEPHONE (OUTPATIENT)
Dept: FAMILY MEDICINE CLINIC | Facility: CLINIC | Age: 67
End: 2017-04-04

## 2017-04-04 LAB
25(OH)D3+25(OH)D2 SERPL-MCNC: 41.3 NG/ML (ref 30–100)
ALBUMIN SERPL-MCNC: 4.4 G/DL (ref 3.5–5)
ALBUMIN/GLOB SERPL: 1.4 G/DL (ref 1.1–2.5)
ALP SERPL-CCNC: 71 U/L (ref 24–120)
ALT SERPL-CCNC: 26 U/L (ref 0–54)
AST SERPL-CCNC: 22 U/L (ref 7–45)
BASOPHILS # BLD AUTO: 0.03 10*3/MM3 (ref 0–0.2)
BASOPHILS NFR BLD AUTO: 0.4 % (ref 0–2)
BILIRUB SERPL-MCNC: 0.6 MG/DL (ref 0.1–1)
BUN SERPL-MCNC: 16 MG/DL (ref 5–21)
BUN/CREAT SERPL: 23.2 (ref 7–25)
CALCIUM SERPL-MCNC: 9.8 MG/DL (ref 8.4–10.4)
CHLORIDE SERPL-SCNC: 97 MMOL/L (ref 98–110)
CHOLEST SERPL-MCNC: 165 MG/DL (ref 130–200)
CO2 SERPL-SCNC: 28 MMOL/L (ref 24–31)
CREAT SERPL-MCNC: 0.69 MG/DL (ref 0.5–1.4)
EOSINOPHIL # BLD AUTO: 0.12 10*3/MM3 (ref 0–0.7)
EOSINOPHIL NFR BLD AUTO: 1.8 % (ref 0–4)
ERYTHROCYTE [DISTWIDTH] IN BLOOD BY AUTOMATED COUNT: 13.6 % (ref 12–15)
GLOBULIN SER CALC-MCNC: 3.2 GM/DL
GLUCOSE SERPL-MCNC: 88 MG/DL (ref 70–100)
HCT VFR BLD AUTO: 42.5 % (ref 37–47)
HCV AB S/CO SERPL IA: <0.1 S/CO RATIO (ref 0–0.9)
HDLC SERPL-MCNC: 44 MG/DL
HGB BLD-MCNC: 13.8 G/DL (ref 12–16)
IMM GRANULOCYTES # BLD: 0.03 10*3/MM3 (ref 0–0.03)
IMM GRANULOCYTES NFR BLD: 0.4 % (ref 0–5)
LDLC SERPL CALC-MCNC: 102 MG/DL (ref 0–99)
LYMPHOCYTES # BLD AUTO: 1.93 10*3/MM3 (ref 0.72–4.86)
LYMPHOCYTES NFR BLD AUTO: 28.9 % (ref 15–45)
MCH RBC QN AUTO: 28.6 PG (ref 28–32)
MCHC RBC AUTO-ENTMCNC: 32.5 G/DL (ref 33–36)
MCV RBC AUTO: 88 FL (ref 82–98)
MONOCYTES # BLD AUTO: 0.51 10*3/MM3 (ref 0.19–1.3)
MONOCYTES NFR BLD AUTO: 7.6 % (ref 4–12)
NEUTROPHILS # BLD AUTO: 4.06 10*3/MM3 (ref 1.87–8.4)
NEUTROPHILS NFR BLD AUTO: 60.9 % (ref 39–78)
PLATELET # BLD AUTO: 397 10*3/MM3 (ref 130–400)
POTASSIUM SERPL-SCNC: 5 MMOL/L (ref 3.5–5.3)
PROT SERPL-MCNC: 7.6 G/DL (ref 6.3–8.7)
RBC # BLD AUTO: 4.83 10*6/MM3 (ref 4.2–5.4)
SODIUM SERPL-SCNC: 138 MMOL/L (ref 135–145)
T4 FREE SERPL-MCNC: 1.26 NG/DL (ref 0.78–2.19)
TRIGL SERPL-MCNC: 93 MG/DL (ref 0–149)
TSH SERPL DL<=0.005 MIU/L-ACNC: 1.3 MIU/ML (ref 0.47–4.68)
VLDLC SERPL CALC-MCNC: 18.6 MG/DL
WBC # BLD AUTO: 6.68 10*3/MM3 (ref 4.8–10.8)

## 2017-04-04 NOTE — TELEPHONE ENCOUNTER
----- Message from Juan Luis Zafar MD sent at 4/4/2017  8:02 AM CDT -----  Lab acceptable continue same

## 2017-04-05 LAB
CYTOLOGIST CVX/VAG CYTO: NORMAL
CYTOLOGY CVX/VAG DOC THIN PREP: NORMAL
DX ICD CODE: NORMAL
HIV 1 & 2 AB SER-IMP: NORMAL
OTHER STN SPEC: NORMAL
PATH REPORT.FINAL DX SPEC: NORMAL
STAT OF ADQ CVX/VAG CYTO-IMP: NORMAL

## 2017-04-12 RX ORDER — AZITHROMYCIN 500 MG/1
500 TABLET, FILM COATED ORAL DAILY
Qty: 3 TABLET | Refills: 0 | Status: SHIPPED | OUTPATIENT
Start: 2017-04-12 | End: 2017-04-15

## 2017-04-17 ENCOUNTER — OFFICE VISIT (OUTPATIENT)
Dept: FAMILY MEDICINE CLINIC | Facility: CLINIC | Age: 67
End: 2017-04-17

## 2017-04-17 VITALS
HEIGHT: 66 IN | WEIGHT: 162 LBS | BODY MASS INDEX: 26.03 KG/M2 | SYSTOLIC BLOOD PRESSURE: 136 MMHG | HEART RATE: 83 BPM | DIASTOLIC BLOOD PRESSURE: 84 MMHG | TEMPERATURE: 99.3 F | OXYGEN SATURATION: 96 %

## 2017-04-17 DIAGNOSIS — R07.2 PRECORDIAL PAIN: ICD-10-CM

## 2017-04-17 DIAGNOSIS — J06.9 ACUTE URI: ICD-10-CM

## 2017-04-17 DIAGNOSIS — J01.91 ACUTE RECURRENT SINUSITIS, UNSPECIFIED LOCATION: Primary | ICD-10-CM

## 2017-04-17 DIAGNOSIS — R05.9 COUGH: ICD-10-CM

## 2017-04-17 PROCEDURE — 99214 OFFICE O/P EST MOD 30 MIN: CPT | Performed by: FAMILY MEDICINE

## 2017-04-17 PROCEDURE — 93000 ELECTROCARDIOGRAM COMPLETE: CPT | Performed by: FAMILY MEDICINE

## 2017-04-17 NOTE — PROGRESS NOTES
"Naina Nigel    1950    Chief Complaint   Patient presents with   • URI       Vitals:    04/17/17 1414   BP: 136/84   BP Location: Right arm   Patient Position: Sitting   Cuff Size: Adult   Pulse: 83   Temp: 99.3 °F (37.4 °C)   TempSrc: Oral   SpO2: 96%   Weight: 162 lb (73.5 kg)   Height: 66\" (167.6 cm)       URI    This is a new problem. The current episode started in the past 7 days. The problem has been gradually improving. The maximum temperature recorded prior to her arrival was 100.4 - 100.9 F. Associated symptoms include chest pain, congestion, coughing, sinus pain, sneezing and a sore throat. She has tried antihistamine for the symptoms. The treatment provided mild relief.       Review of Systems   HENT: Positive for congestion, sneezing and sore throat.    Respiratory: Positive for cough.    Cardiovascular: Positive for chest pain.       Past Medical History:   Diagnosis Date   • Hyperlipidemia    • Hypertension    • Hypothyroidism          Current Outpatient Prescriptions:   •  albuterol (PROVENTIL HFA;VENTOLIN HFA) 108 (90 BASE) MCG/ACT inhaler, Inhale 2 puffs Every 4 (Four) Hours As Needed for wheezing., Disp: 3.7 g, Rfl: 2  •  alendronate (FOSAMAX) 70 MG tablet, , Disp: , Rfl:   •  amLODIPine (NORVASC) 10 MG tablet, , Disp: , Rfl:   •  aspirin 81 MG EC tablet, Take 81 mg by mouth Daily., Disp: , Rfl:   •  cetirizine (zyrTEC) 10 MG tablet, Take 10 mg by mouth Daily., Disp: , Rfl:   •  Cholecalciferol (VITAMIN D3) 5000 UNITS capsule capsule, Take 5,000 Units by mouth Daily., Disp: , Rfl:   •  CloNIDine (CATAPRES) 0.1 MG tablet, 1 at bedtime for blood pressure, Disp: 90 tablet, Rfl: 3  •  fluticasone (FLONASE) 50 MCG/ACT nasal spray, 2 sprays into each nostril Daily., Disp: , Rfl:   •  indapamide (LOZOL) 2.5 MG tablet, , Disp: , Rfl:   •  levothyroxine (SYNTHROID, LEVOTHROID) 50 MCG tablet, , Disp: , Rfl:   •  losartan (COZAAR) 100 MG tablet, , Disp: , Rfl:   •  Multiple Vitamins-Minerals " "(MULTIVITAMIN ADULTS 50+ PO), Take  by mouth., Disp: , Rfl:   •  nystatin susp + lidocaine viscous (MAGIC MOUTHWASH) oral suspension, Swish and swallow 5 mL 3 (Three) Times a Day., Disp: 60 mL, Rfl: 0  •  omeprazole (priLOSEC) 40 MG capsule, Take 1 capsule by mouth Daily., Disp: 30 capsule, Rfl: 2  •  pravastatin (PRAVACHOL) 40 MG tablet, , Disp: , Rfl:     Allergies   Allergen Reactions   • Sulfa Antibiotics Hives   • Penicillins    • Prednisone        There is no problem list on file for this patient.      Social History     Social History   • Marital status:      Spouse name: N/A   • Number of children: N/A   • Years of education: N/A     Social History Main Topics   • Smoking status: Never Smoker   • Smokeless tobacco: Never Used   • Alcohol use Yes      Comment: occ   • Drug use: No   • Sexual activity: Defer     Other Topics Concern   • None     Social History Narrative       Past Surgical History:   Procedure Laterality Date   • COLONOSCOPY  10/04/2016    [facility] [place] - DR KING   • EYE SURGERY     • TUBAL ABDOMINAL LIGATION         Physical Exam   Constitutional: She is oriented to person, place, and time. She appears well-developed and well-nourished.   HENT:   Right Ear: External ear normal.   Left Ear: External ear normal.   Mouth/Throat: Oropharynx is clear and moist.   Neck: No thyromegaly present.   Cardiovascular: Normal rate and regular rhythm.    Pulmonary/Chest: Effort normal and breath sounds normal. She has no wheezes. She has no rales.   Lymphadenopathy:     She has no cervical adenopathy.   Neurological: She is alert and oriented to person, place, and time.   Psychiatric: She has a normal mood and affect. Her behavior is normal. Thought content normal.   Vitals reviewed.      Vitals:    04/17/17 1414   BP: 136/84   BP Location: Right arm   Patient Position: Sitting   Cuff Size: Adult   Pulse: 83   Temp: 99.3 °F (37.4 °C)   TempSrc: Oral   SpO2: 96%   Weight: 162 lb (73.5 kg)   Height: 66\" (167.6 " tatyana       Naina was seen today for uri.    Diagnoses and all orders for this visit:    Precordial pain  -     ECG 12 Lead        Problem List Items Addressed This Visit     None      Visit Diagnoses     Precordial pain    -  Primary    Relevant Orders    ECG 12 Lead              Plan just finished [facility], sent out for a chest x-ray and sinus series.    If does not clear or white have a short fuse to put her into the [facility]    Because of chest discomfort EKG shows nonspecific ST segment changes-seeing cardiologist in a couple weeks.  Her EKG was compared with one 4 years ago and he had changed.  The one 4 years ago was totally normal                    Juan Luis Zafar MD  4/17/2017

## 2017-04-21 RX ORDER — DIPHENHYDRAMINE, LIDOCAINE, NYSTATIN
5 KIT ORAL 4 TIMES DAILY
Qty: 60 ML | Refills: 1 | Status: SHIPPED | OUTPATIENT
Start: 2017-04-21 | End: 2017-06-12

## 2017-04-21 RX ORDER — FLUCONAZOLE 150 MG/1
TABLET ORAL
Qty: 2 TABLET | Refills: 0 | Status: SHIPPED | OUTPATIENT
Start: 2017-04-21 | End: 2017-06-12

## 2017-05-03 DIAGNOSIS — J18.9 PNEUMONIA DUE TO INFECTIOUS ORGANISM, UNSPECIFIED LATERALITY, UNSPECIFIED PART OF LUNG: Primary | ICD-10-CM

## 2017-05-11 DIAGNOSIS — R91.8 PULMONARY INFILTRATE IN LEFT LUNG ON CHEST X-RAY: Primary | ICD-10-CM

## 2017-05-11 DIAGNOSIS — J18.9 PNEUMONIA DUE TO INFECTIOUS ORGANISM, UNSPECIFIED LATERALITY, UNSPECIFIED PART OF LUNG: ICD-10-CM

## 2017-05-12 DIAGNOSIS — R91.8 PULMONARY INFILTRATE IN LEFT LUNG ON CHEST X-RAY: ICD-10-CM

## 2017-05-12 LAB
BUN SERPL-MCNC: 14 MG/DL (ref 5–21)
BUN/CREAT SERPL: 19.7 (ref 7–25)
CALCIUM SERPL-MCNC: 10.1 MG/DL (ref 8.4–10.4)
CHLORIDE SERPL-SCNC: 91 MMOL/L (ref 98–110)
CO2 SERPL-SCNC: 31 MMOL/L (ref 24–31)
CREAT SERPL-MCNC: 0.71 MG/DL (ref 0.5–1.4)
GLUCOSE SERPL-MCNC: 95 MG/DL (ref 70–100)
POTASSIUM SERPL-SCNC: 4.4 MMOL/L (ref 3.5–5.3)
SODIUM SERPL-SCNC: 137 MMOL/L (ref 135–145)

## 2017-05-12 RX ORDER — LEVOFLOXACIN 500 MG/1
500 TABLET, FILM COATED ORAL DAILY
Qty: 10 TABLET | Refills: 0 | Status: SHIPPED | OUTPATIENT
Start: 2017-05-12 | End: 2017-06-12

## 2017-05-15 ENCOUNTER — TELEPHONE (OUTPATIENT)
Dept: FAMILY MEDICINE CLINIC | Facility: CLINIC | Age: 67
End: 2017-05-15

## 2017-05-15 DIAGNOSIS — R91.8 PULMONARY INFILTRATE IN LEFT LUNG ON CHEST X-RAY: ICD-10-CM

## 2017-05-25 ENCOUNTER — OFFICE VISIT (OUTPATIENT)
Dept: FAMILY MEDICINE CLINIC | Facility: CLINIC | Age: 67
End: 2017-05-25

## 2017-05-25 VITALS
DIASTOLIC BLOOD PRESSURE: 92 MMHG | OXYGEN SATURATION: 97 % | HEIGHT: 66 IN | HEART RATE: 82 BPM | WEIGHT: 158.6 LBS | TEMPERATURE: 99.7 F | SYSTOLIC BLOOD PRESSURE: 138 MMHG | BODY MASS INDEX: 25.49 KG/M2

## 2017-05-25 DIAGNOSIS — J18.9 PNEUMONIA OF LEFT LOWER LOBE DUE TO INFECTIOUS ORGANISM: Primary | ICD-10-CM

## 2017-05-25 PROCEDURE — 99214 OFFICE O/P EST MOD 30 MIN: CPT | Performed by: FAMILY MEDICINE

## 2017-05-25 RX ORDER — DOXYCYCLINE 100 MG/1
100 TABLET ORAL 2 TIMES DAILY
Qty: 20 TABLET | Refills: 0 | Status: SHIPPED | OUTPATIENT
Start: 2017-05-25 | End: 2017-06-12

## 2017-05-29 ENCOUNTER — HOSPITAL ENCOUNTER (EMERGENCY)
Facility: HOSPITAL | Age: 67
Discharge: HOME | End: 2017-05-29
Attending: FAMILY MEDICINE | Admitting: FAMILY MEDICINE

## 2017-05-29 VITALS
WEIGHT: 158 LBS | SYSTOLIC BLOOD PRESSURE: 127 MMHG | HEART RATE: 75 BPM | HEIGHT: 66 IN | DIASTOLIC BLOOD PRESSURE: 83 MMHG | TEMPERATURE: 97.9 F | BODY MASS INDEX: 25.39 KG/M2 | RESPIRATION RATE: 16 BRPM | OXYGEN SATURATION: 96 %

## 2017-05-29 DIAGNOSIS — Z91.09 ENVIRONMENTAL ALLERGIES: ICD-10-CM

## 2017-05-29 DIAGNOSIS — R05.9 COUGH: Primary | ICD-10-CM

## 2017-05-29 PROCEDURE — 99283 EMERGENCY DEPT VISIT LOW MDM: CPT

## 2017-06-12 ENCOUNTER — OFFICE VISIT (OUTPATIENT)
Dept: FAMILY MEDICINE CLINIC | Facility: CLINIC | Age: 67
End: 2017-06-12

## 2017-06-12 VITALS
DIASTOLIC BLOOD PRESSURE: 88 MMHG | HEIGHT: 66 IN | HEART RATE: 88 BPM | WEIGHT: 155 LBS | TEMPERATURE: 99.1 F | BODY MASS INDEX: 24.91 KG/M2 | SYSTOLIC BLOOD PRESSURE: 141 MMHG | OXYGEN SATURATION: 98 %

## 2017-06-12 DIAGNOSIS — R39.89 BLADDER PAIN: Primary | ICD-10-CM

## 2017-06-12 DIAGNOSIS — F41.9 ANXIETY: ICD-10-CM

## 2017-06-12 LAB
BILIRUB BLD-MCNC: NEGATIVE MG/DL
CLARITY, POC: CLEAR
COLOR UR: YELLOW
GLUCOSE UR STRIP-MCNC: NEGATIVE MG/DL
KETONES UR QL: NEGATIVE
LEUKOCYTE EST, POC: NEGATIVE
NITRITE UR-MCNC: NEGATIVE MG/ML
PH UR: 8 [PH] (ref 5–8)
PROT UR STRIP-MCNC: NEGATIVE MG/DL
RBC # UR STRIP: NEGATIVE /UL
SP GR UR: 1.01 (ref 1–1.03)
UROBILINOGEN UR QL: NORMAL

## 2017-06-12 PROCEDURE — 81003 URINALYSIS AUTO W/O SCOPE: CPT | Performed by: FAMILY MEDICINE

## 2017-06-12 PROCEDURE — 99214 OFFICE O/P EST MOD 30 MIN: CPT | Performed by: FAMILY MEDICINE

## 2017-06-12 RX ORDER — CITALOPRAM 20 MG/1
20 TABLET ORAL DAILY
Qty: 90 TABLET | Refills: 1 | Status: SHIPPED | OUTPATIENT
Start: 2017-06-12 | End: 2017-06-27

## 2017-06-12 NOTE — PROGRESS NOTES
"Naina Manning    1950    Chief Complaint   Patient presents with   • Bladder Problem   • Night Sweats   • Nausea   • Muscle Pain       Vitals:    06/12/17 0817   BP: 141/88   BP Location: Left arm   Patient Position: Sitting   Cuff Size: Adult   Pulse: 88   Temp: 99.1 °F (37.3 °C)   TempSrc: Oral   SpO2: 98%   Weight: 155 lb (70.3 kg)   Height: 66\" (167.6 cm)       Anxiety   Presents for follow-up visit. Symptoms include depressed mood, muscle tension, nausea and nervous/anxious behavior. Symptoms occur most days. The severity of symptoms is moderate.     Compliance with medications is %. Side effects of treatment include headaches and urinary problems.       Review of Systems   Gastrointestinal: Positive for nausea.   Genitourinary: Positive for dysuria.   Psychiatric/Behavioral: Positive for dysphoric mood. The patient is nervous/anxious.        Past Medical History:   Diagnosis Date   • Hyperlipidemia    • Hypertension    • Hypothyroidism          Current Outpatient Prescriptions:   •  albuterol (PROVENTIL HFA;VENTOLIN HFA) 108 (90 BASE) MCG/ACT inhaler, Inhale 2 puffs Every 4 (Four) Hours As Needed for wheezing., Disp: 3.7 g, Rfl: 2  •  amLODIPine (NORVASC) 10 MG tablet, , Disp: , Rfl:   •  aspirin 81 MG EC tablet, Take 81 mg by mouth Daily., Disp: , Rfl:   •  cetirizine (zyrTEC) 10 MG tablet, Take 10 mg by mouth Daily., Disp: , Rfl:   •  Cholecalciferol (VITAMIN D3) 5000 UNITS capsule capsule, Take 5,000 Units by mouth Daily., Disp: , Rfl:   •  fluticasone (FLONASE) 50 MCG/ACT nasal spray, 2 sprays into each nostril Daily., Disp: , Rfl:   •  indapamide (LOZOL) 2.5 MG tablet, , Disp: , Rfl:   •  levothyroxine (SYNTHROID, LEVOTHROID) 50 MCG tablet, , Disp: , Rfl:   •  losartan (COZAAR) 100 MG tablet, , Disp: , Rfl:   •  omeprazole (priLOSEC) 40 MG capsule, Take 1 capsule by mouth Daily., Disp: 30 capsule, Rfl: 2  •  pravastatin (PRAVACHOL) 40 MG tablet, , Disp: , Rfl:   •  alendronate (FOSAMAX) 70 MG " "tablet, , Disp: , Rfl:   •  citalopram (CeleXA) 20 MG tablet, Take 1 tablet by mouth Daily., Disp: 90 tablet, Rfl: 1  •  Multiple Vitamins-Minerals (MULTIVITAMIN ADULTS 50+ PO), Take  by mouth., Disp: , Rfl:     Allergies   Allergen Reactions   • Sulfa Antibiotics Hives   • Penicillins    • Prednisone        There is no problem list on file for this patient.      Social History     Social History   • Marital status:      Spouse name: N/A   • Number of children: N/A   • Years of education: N/A     Social History Main Topics   • Smoking status: Never Smoker   • Smokeless tobacco: Never Used   • Alcohol use Yes      Comment: occ   • Drug use: No   • Sexual activity: Defer     Other Topics Concern   • None     Social History Narrative       Past Surgical History:   Procedure Laterality Date   • [place]    [facility] [place]   • EYE SURGERY     • TUBAL ABDOMINAL LIGATION         Physical Exam   Constitutional: She is oriented to person, place, and time. She appears well-developed and well-nourished.   Cardiovascular: Normal rate and regular rhythm.    Pulmonary/Chest: Effort normal and breath sounds normal.   Genitourinary: Vagina normal and uterus normal.   Genitourinary Comments: Pelvic-cervix present no adnexal masses uterus normal size no vaginal discharge   Neurological: She is alert and oriented to person, place, and time.   Skin: Skin is warm and dry.   Psychiatric: Her behavior is normal. Thought content normal.   Mood is depressed   Vitals reviewed.      Vitals:    06/12/17 0817   BP: 141/88   BP Location: Left arm   Patient Position: Sitting   Cuff Size: Adult   Pulse: 88   Temp: 99.1 °F (37.3 °C)   TempSrc: Oral   SpO2: 98%   Weight: 155 lb (70.3 kg)   Height: 66\" (167.6 cm)       Naina was seen today for bladder problem, night sweats, nausea and muscle pain.    Diagnoses and all orders for this visit:    Bladder pain  -     POCT urinalysis dipstick, automated    Anxiety    Other orders  - "     citalopram (CeleXA) 20 MG tablet; Take 1 tablet by mouth Daily.        Problem List Items Addressed This Visit     None      Visit Diagnoses     Bladder pain    -  Primary    Relevant Orders    POCT urinalysis dipstick, automated (Completed)    Anxiety                  [facility] at [place] Has lab pending-seeing pulmonologist in August-Start Celexa 20 mg generic see back 3 weeks                    Juan Luis Zafar MD  6/12/2017

## 2017-06-14 ENCOUNTER — TELEPHONE (OUTPATIENT)
Dept: FAMILY MEDICINE CLINIC | Facility: CLINIC | Age: 67
End: 2017-06-14

## 2017-06-14 RX ORDER — POTASSIUM CHLORIDE 1.5 G/1.77G
20 POWDER, FOR SOLUTION ORAL 2 TIMES DAILY
Qty: 60 PACKET | Refills: 2 | Status: SHIPPED | OUTPATIENT
Start: 2017-06-14 | End: 2018-03-29

## 2017-06-14 NOTE — TELEPHONE ENCOUNTER
Went to ER last night. Burning, tingling sensation like her skin was on fire on arms, below breasts, stomach. Jittery, clammy skin.  Had labs Dr. Hudson said potassium was 2.8, asked her if she had her thyroid checked.  Advised her to call you this morning.Thought she was having a reaction to new medicine she started on Monday.

## 2017-06-16 ENCOUNTER — OFFICE VISIT (OUTPATIENT)
Dept: FAMILY MEDICINE CLINIC | Facility: CLINIC | Age: 67
End: 2017-06-16

## 2017-06-16 VITALS
HEIGHT: 66 IN | TEMPERATURE: 99.5 F | DIASTOLIC BLOOD PRESSURE: 80 MMHG | BODY MASS INDEX: 25.26 KG/M2 | OXYGEN SATURATION: 98 % | SYSTOLIC BLOOD PRESSURE: 122 MMHG | WEIGHT: 157.2 LBS | HEART RATE: 79 BPM

## 2017-06-16 DIAGNOSIS — R11.0 NAUSEA: Primary | ICD-10-CM

## 2017-06-16 PROCEDURE — 99214 OFFICE O/P EST MOD 30 MIN: CPT | Performed by: FAMILY MEDICINE

## 2017-06-16 PROCEDURE — 96372 THER/PROPH/DIAG INJ SC/IM: CPT | Performed by: FAMILY MEDICINE

## 2017-06-16 RX ORDER — PROMETHAZINE HYDROCHLORIDE 25 MG/ML
12.5 INJECTION, SOLUTION INTRAMUSCULAR; INTRAVENOUS ONCE
Status: COMPLETED | OUTPATIENT
Start: 2017-06-16 | End: 2017-06-16

## 2017-06-16 RX ORDER — HYDROXYZINE HYDROCHLORIDE 25 MG/1
TABLET, FILM COATED ORAL
Qty: 60 TABLET | Refills: 2 | Status: SHIPPED | OUTPATIENT
Start: 2017-06-16 | End: 2018-10-17

## 2017-06-16 RX ADMIN — PROMETHAZINE HYDROCHLORIDE 12.5 MG: 25 INJECTION, SOLUTION INTRAMUSCULAR; INTRAVENOUS at 08:34

## 2017-06-16 NOTE — PROGRESS NOTES
"Naina Manning    1950    Chief Complaint   Patient presents with   • Insomnia   • Nausea   • Shaking       Vitals:    06/16/17 0812   BP: 122/80   BP Location: Right arm   Patient Position: Sitting   Cuff Size: Adult   Pulse: 79   Temp: 99.5 °F (37.5 °C)   TempSrc: Oral   SpO2: 98%   Weight: 157 lb 3.2 oz (71.3 kg)   Height: 66\" (167.6 cm)       Nausea   This is a new problem. The current episode started 1 to 4 weeks ago. The problem occurs constantly. The problem has been unchanged. Associated symptoms include nausea. Associated symptoms comments: Hypokalemia. Exacerbated by: Anxiety. She has tried nothing for the symptoms.       Review of Systems   Respiratory: Negative for chest tightness and shortness of breath.    Gastrointestinal: Positive for nausea.   Psychiatric/Behavioral: Positive for dysphoric mood. The patient is nervous/anxious.        Past Medical History:   Diagnosis Date   • Hyperlipidemia    • Hypertension    • Hypothyroidism          Current Outpatient Prescriptions:   •  albuterol (PROVENTIL HFA;VENTOLIN HFA) 108 (90 BASE) MCG/ACT inhaler, Inhale 2 puffs Every 4 (Four) Hours As Needed for wheezing., Disp: 3.7 g, Rfl: 2  •  alendronate (FOSAMAX) 70 MG tablet, , Disp: , Rfl:   •  amLODIPine (NORVASC) 10 MG tablet, , Disp: , Rfl:   •  aspirin 81 MG EC tablet, Take 81 mg by mouth Daily., Disp: , Rfl:   •  cetirizine (zyrTEC) 10 MG tablet, Take 10 mg by mouth Daily., Disp: , Rfl:   •  Cholecalciferol (VITAMIN D3) 5000 UNITS capsule capsule, Take 5,000 Units by mouth Daily., Disp: , Rfl:   •  citalopram (CeleXA) 20 MG tablet, Take 1 tablet by mouth Daily., Disp: 90 tablet, Rfl: 1  •  fluticasone (FLONASE) 50 MCG/ACT nasal spray, 2 sprays into each nostril Daily., Disp: , Rfl:   •  indapamide (LOZOL) 2.5 MG tablet, , Disp: , Rfl:   •  levothyroxine (SYNTHROID, LEVOTHROID) 50 MCG tablet, , Disp: , Rfl:   •  losartan (COZAAR) 100 MG tablet, , Disp: , Rfl:   •  Multiple Vitamins-Minerals (MULTIVITAMIN " ADULTS 50+ PO), Take  by mouth., Disp: , Rfl:   •  omeprazole (priLOSEC) 40 MG capsule, Take 1 capsule by mouth Daily., Disp: 30 capsule, Rfl: 2  •  potassium chloride (KLOR-CON) 20 MEQ packet, Take 20 mEq by mouth 2 (Two) Times a Day., Disp: 60 packet, Rfl: 2  •  pravastatin (PRAVACHOL) 40 MG tablet, , Disp: , Rfl:   •  hydrOXYzine (ATARAX) 25 MG tablet, One or 2 every 4-6 hours as needed for anxiety and nausea, Disp: 60 tablet, Rfl: 2    Current Facility-Administered Medications:   •  promethazine (PHENERGAN) injection 12.5 mg, 12.5 mg, Intramuscular, Once, Juan Luis Zafar MD    Allergies   Allergen Reactions   • Sulfa Antibiotics Hives   • Penicillins    • Prednisone        There is no problem list on file for this patient.      Social History     Social History   • Marital status:      Spouse name: N/A   • Number of children: N/A   • Years of education: N/A     Social History Main Topics   • Smoking status: Never Smoker   • Smokeless tobacco: Never Used   • Alcohol use Yes      Comment: occ   • Drug use: No   • Sexual activity: Defer     Other Topics Concern   • None     Social History Narrative       Past Surgical History:   Procedure Laterality Date   • COLONOSCOPY  10/04/2016    [facility] – [place], [place] - DR KING   • EYE SURGERY     • TUBAL ABDOMINAL LIGATION         Physical Exam   Constitutional: She is oriented to person, place, and time. She appears well-developed and well-nourished.   HENT:   Right Ear: External ear normal.   Left Ear: External ear normal.   Mouth/Throat: Oropharynx is clear and moist.   Eyes: EOM are normal. Pupils are equal, round, and reactive to light.   Neck: No thyromegaly present.   Cardiovascular: Normal rate and regular rhythm.    Pulmonary/Chest: Effort normal and breath sounds normal.   Lymphadenopathy:     She has no cervical adenopathy.   Neurological: She is alert and oriented to person, place, and time.   No neurologic focality   Psychiatric: Her behavior is normal. Thought  "content normal.   Vitals reviewed.      Vitals:    06/16/17 0812   BP: 122/80   BP Location: Right arm   Patient Position: Sitting   Cuff Size: Adult   Pulse: 79   Temp: 99.5 °F (37.5 °C)   TempSrc: Oral   SpO2: 98%   Weight: 157 lb 3.2 oz (71.3 kg)   Height: 66\" (167.6 cm)       Naina was seen today for insomnia, nausea and shaking.    Diagnoses and all orders for this visit:    Nausea  -     promethazine (PHENERGAN) injection 12.5 mg; Inject 0.5 mL into the shoulder, thigh, or buttocks 1 (One) Time.    Other orders  -     hydrOXYzine (ATARAX) 25 MG tablet; One or 2 every 4-6 hours as needed for anxiety and nausea        Problem List Items Addressed This Visit     None      Visit Diagnoses     Nausea    -  Primary    Relevant Medications    promethazine (PHENERGAN) injection 12.5 mg (Start on 6/16/2017  9:00 AM)                    Plan stat BMP may need IV potassium plus above-continue Celexa 20 mg                  Juan Luis Zafar MD  6/16/2017  "

## 2017-06-19 ENCOUNTER — OFFICE VISIT (OUTPATIENT)
Dept: FAMILY MEDICINE CLINIC | Facility: CLINIC | Age: 67
End: 2017-06-19

## 2017-06-19 VITALS
TEMPERATURE: 99.4 F | OXYGEN SATURATION: 96 % | WEIGHT: 157 LBS | DIASTOLIC BLOOD PRESSURE: 85 MMHG | HEIGHT: 66 IN | BODY MASS INDEX: 25.23 KG/M2 | HEART RATE: 78 BPM | SYSTOLIC BLOOD PRESSURE: 148 MMHG

## 2017-06-19 DIAGNOSIS — E87.1 HYPONATREMIA: Primary | ICD-10-CM

## 2017-06-19 DIAGNOSIS — R53.83 FATIGUE, UNSPECIFIED TYPE: ICD-10-CM

## 2017-06-19 LAB
BILIRUB BLD-MCNC: NEGATIVE MG/DL
CLARITY, POC: CLEAR
COLOR UR: YELLOW
GLUCOSE UR STRIP-MCNC: NEGATIVE MG/DL
KETONES UR QL: ABNORMAL
LEUKOCYTE EST, POC: NEGATIVE
NITRITE UR-MCNC: NEGATIVE MG/ML
PH UR: 6 [PH] (ref 5–8)
PROT UR STRIP-MCNC: ABNORMAL MG/DL
RBC # UR STRIP: NEGATIVE /UL
SODIUM 24H UR-SCNC: 50 MMOL/L (ref 30–90)
SP GR UR: 1.02 (ref 1–1.03)
UROBILINOGEN UR QL: NORMAL

## 2017-06-19 PROCEDURE — 81003 URINALYSIS AUTO W/O SCOPE: CPT | Performed by: FAMILY MEDICINE

## 2017-06-19 PROCEDURE — 99213 OFFICE O/P EST LOW 20 MIN: CPT | Performed by: FAMILY MEDICINE

## 2017-06-19 RX ORDER — AMLODIPINE BESYLATE 10 MG/1
10 TABLET ORAL DAILY
Qty: 90 TABLET | Refills: 2 | Status: SHIPPED | OUTPATIENT
Start: 2017-06-19 | End: 2017-10-02 | Stop reason: SDUPTHER

## 2017-06-19 RX ORDER — LOSARTAN POTASSIUM 100 MG/1
100 TABLET ORAL DAILY
Qty: 90 TABLET | Refills: 2 | Status: SHIPPED | OUTPATIENT
Start: 2017-06-19 | End: 2017-10-02 | Stop reason: SDUPTHER

## 2017-06-19 RX ORDER — FLUTICASONE PROPIONATE 50 MCG
2 SPRAY, SUSPENSION (ML) NASAL DAILY
Qty: 16 G | Refills: 3 | Status: SHIPPED | OUTPATIENT
Start: 2017-06-19 | End: 2018-05-04 | Stop reason: SDUPTHER

## 2017-06-19 RX ORDER — OMEPRAZOLE 40 MG/1
40 CAPSULE, DELAYED RELEASE ORAL DAILY
Qty: 90 CAPSULE | Refills: 2 | Status: SHIPPED | OUTPATIENT
Start: 2017-06-19 | End: 2018-05-04 | Stop reason: SDUPTHER

## 2017-06-19 RX ORDER — INDAPAMIDE 2.5 MG/1
2.5 TABLET, FILM COATED ORAL DAILY
Qty: 90 TABLET | Refills: 2 | Status: SHIPPED | OUTPATIENT
Start: 2017-06-19 | End: 2017-07-11

## 2017-06-19 RX ORDER — PRAVASTATIN SODIUM 40 MG
40 TABLET ORAL NIGHTLY
Qty: 90 TABLET | Refills: 2 | Status: SHIPPED | OUTPATIENT
Start: 2017-06-19 | End: 2017-10-02 | Stop reason: SDUPTHER

## 2017-06-19 RX ORDER — LEVOTHYROXINE SODIUM 0.05 MG/1
50 TABLET ORAL DAILY
Qty: 90 TABLET | Refills: 2 | Status: SHIPPED | OUTPATIENT
Start: 2017-06-19 | End: 2017-10-02 | Stop reason: SDUPTHER

## 2017-06-19 NOTE — PROGRESS NOTES
"Naina Nigel    1950    Chief Complaint   Patient presents with   • Follow-up       Vitals:    06/19/17 0818   BP: 148/85   BP Location: Right arm   Patient Position: Sitting   Cuff Size: Adult   Pulse: 78   Temp: 99.4 °F (37.4 °C)   TempSrc: Oral   SpO2: 96%   Weight: 157 lb (71.2 kg)   Height: 66\" (167.6 cm)       Fatigue   This is a new problem. The current episode started in the past 7 days. The problem occurs daily. The problem has been gradually improving. Associated symptoms include fatigue. Pertinent negatives include no chest pain. Associated symptoms comments: Hyponatremia. Exacerbated by:  SSRI. Treatments tried: Stopped diuretic, SSRI, restricted fluid. The treatment provided moderate relief.       Review of Systems   Constitutional: Positive for fatigue.   Cardiovascular: Negative for chest pain and leg swelling.   Neurological:        Paresthesias   Psychiatric/Behavioral: The patient is nervous/anxious.        Past Medical History:   Diagnosis Date   • Hyperlipidemia    • Hypertension    • Hypothyroidism          Current Outpatient Prescriptions:   •  albuterol (PROVENTIL HFA;VENTOLIN HFA) 108 (90 BASE) MCG/ACT inhaler, Inhale 2 puffs Every 4 (Four) Hours As Needed for wheezing., Disp: 3.7 g, Rfl: 2  •  alendronate (FOSAMAX) 70 MG tablet, , Disp: , Rfl:   •  amLODIPine (NORVASC) 10 MG tablet, , Disp: , Rfl:   •  aspirin 81 MG EC tablet, Take 81 mg by mouth Daily., Disp: , Rfl:   •  cetirizine (zyrTEC) 10 MG tablet, Take 10 mg by mouth Daily., Disp: , Rfl:   •  Cholecalciferol (VITAMIN D3) 5000 UNITS capsule capsule, Take 5,000 Units by mouth Daily., Disp: , Rfl:   •  fluticasone (FLONASE) 50 MCG/ACT nasal spray, 2 sprays into each nostril Daily., Disp: , Rfl:   •  hydrOXYzine (ATARAX) 25 MG tablet, One or 2 every 4-6 hours as needed for anxiety and nausea, Disp: 60 tablet, Rfl: 2  •  indapamide (LOZOL) 2.5 MG tablet, , Disp: , Rfl:   •  levothyroxine (SYNTHROID, LEVOTHROID) 50 MCG tablet, , Disp: " , Rfl:   •  losartan (COZAAR) 100 MG tablet, , Disp: , Rfl:   •  Multiple Vitamins-Minerals (MULTIVITAMIN ADULTS 50+ PO), Take  by mouth., Disp: , Rfl:   •  omeprazole (priLOSEC) 40 MG capsule, Take 1 capsule by mouth Daily., Disp: 30 capsule, Rfl: 2  •  potassium chloride (KLOR-CON) 20 MEQ packet, Take 20 mEq by mouth 2 (Two) Times a Day., Disp: 60 packet, Rfl: 2  •  pravastatin (PRAVACHOL) 40 MG tablet, , Disp: , Rfl:   •  citalopram (CeleXA) 20 MG tablet, Take 1 tablet by mouth Daily., Disp: 90 tablet, Rfl: 1    Allergies   Allergen Reactions   • Sulfa Antibiotics Hives   • Penicillins    • Prednisone        There is no problem list on file for this patient.      Social History     Social History   • Marital status:      Spouse name: N/A   • Number of children: N/A   • Years of education: N/A     Social History Main Topics   • Smoking status: Never Smoker   • Smokeless tobacco: Never Used   • Alcohol use Yes      Comment: occ   • Drug use: No   • Sexual activity: Defer     Other Topics Concern   • None     Social History Narrative       Past Surgical History:   Procedure Laterality Date   • COLONOSCOPY  10/04/2016    [facility] [facility] - DR KING   • EYE SURGERY     • TUBAL ABDOMINAL LIGATION         Physical Exam   Constitutional: She is oriented to person, place, and time. She appears well-developed and well-nourished.   Cardiovascular: Normal rate and regular rhythm.    Pulmonary/Chest: Effort normal and breath sounds normal.   Musculoskeletal: She exhibits no edema.   Neurological: She is alert and oriented to person, place, and time.   No tremor   Skin: Skin is warm and dry.   Psychiatric: She has a normal mood and affect. Her behavior is normal. Judgment and thought content normal.   Vitals reviewed.      Vitals:    06/19/17 0818   BP: 148/85   BP Location: Right arm   Patient Position: Sitting   Cuff Size: Adult   Pulse: 78   Temp: 99.4 °F (37.4 °C)   TempSrc: Oral   SpO2: 96%   Weight: 157 lb (71.2 kg)   Height:  "66\" (167.6 cm)       Naina was seen today for follow-up.    Diagnoses and all orders for this visit:    Hyponatremia  -     Sodium, Urine, Random  -     CBC & Differential  -     Basic Metabolic Panel  -     Cortisol - AM    Fatigue, unspecified type  -     CBC & Differential        Problem List Items Addressed This Visit     None      Visit Diagnoses     Hyponatremia    -  Primary    Relevant Orders    Sodium, Urine, Random    CBC & Differential    Basic Metabolic Panel    Cortisol - AM    Fatigue, unspecified type        Relevant Orders    CBC & Differential                Major symptomatic improvement-above lab-may loosen fluid restriction and allow exercise                      Juan Luis Zafar MD  6/19/2017  "

## 2017-06-20 ENCOUNTER — TELEPHONE (OUTPATIENT)
Dept: FAMILY MEDICINE CLINIC | Facility: CLINIC | Age: 67
End: 2017-06-20

## 2017-06-20 LAB
BASOPHILS # BLD AUTO: 0.04 10*3/MM3 (ref 0–0.2)
BASOPHILS NFR BLD AUTO: 0.5 % (ref 0–2)
BUN SERPL-MCNC: 18 MG/DL (ref 5–21)
BUN/CREAT SERPL: 20.7 (ref 7–25)
CALCIUM SERPL-MCNC: 9.5 MG/DL (ref 8.4–10.4)
CHLORIDE SERPL-SCNC: 96 MMOL/L (ref 98–110)
CO2 SERPL-SCNC: 31 MMOL/L (ref 24–31)
CORTIS AM PEAK SERPL-MCNC: 7.7 UG/DL (ref 6.2–19.4)
CREAT SERPL-MCNC: 0.87 MG/DL (ref 0.5–1.4)
EOSINOPHIL # BLD AUTO: 0.14 10*3/MM3 (ref 0–0.7)
EOSINOPHIL NFR BLD AUTO: 1.9 % (ref 0–4)
ERYTHROCYTE [DISTWIDTH] IN BLOOD BY AUTOMATED COUNT: 13.4 % (ref 12–15)
GLUCOSE SERPL-MCNC: 86 MG/DL (ref 70–100)
HCT VFR BLD AUTO: 42.2 % (ref 37–47)
HGB BLD-MCNC: 14 G/DL (ref 12–16)
IMM GRANULOCYTES # BLD: 0.03 10*3/MM3 (ref 0–0.03)
IMM GRANULOCYTES NFR BLD: 0.4 % (ref 0–5)
LYMPHOCYTES # BLD AUTO: 2.1 10*3/MM3 (ref 0.72–4.86)
LYMPHOCYTES NFR BLD AUTO: 27.9 % (ref 15–45)
MCH RBC QN AUTO: 29.1 PG (ref 28–32)
MCHC RBC AUTO-ENTMCNC: 33.2 G/DL (ref 33–36)
MCV RBC AUTO: 87.7 FL (ref 82–98)
MONOCYTES # BLD AUTO: 0.55 10*3/MM3 (ref 0.19–1.3)
MONOCYTES NFR BLD AUTO: 7.3 % (ref 4–12)
NEUTROPHILS # BLD AUTO: 4.66 10*3/MM3 (ref 1.87–8.4)
NEUTROPHILS NFR BLD AUTO: 62 % (ref 39–78)
PLATELET # BLD AUTO: 428 10*3/MM3 (ref 130–400)
POTASSIUM SERPL-SCNC: 4.5 MMOL/L (ref 3.5–5.3)
RBC # BLD AUTO: 4.81 10*6/MM3 (ref 4.2–5.4)
SODIUM SERPL-SCNC: 139 MMOL/L (ref 135–145)
WBC # BLD AUTO: 7.52 10*3/MM3 (ref 4.8–10.8)

## 2017-06-21 ENCOUNTER — DOCUMENTATION (OUTPATIENT)
Dept: FAMILY MEDICINE CLINIC | Facility: CLINIC | Age: 67
End: 2017-06-21

## 2017-06-21 NOTE — PROGRESS NOTES
Hyponatremia corrected itself with fluid restriction-possible lab are on 6/16.    Patient called for get fluid restriction started exercising see me one month will need a BMP in 1 month

## 2017-06-27 ENCOUNTER — OFFICE VISIT (OUTPATIENT)
Dept: FAMILY MEDICINE CLINIC | Facility: CLINIC | Age: 67
End: 2017-06-27

## 2017-06-27 VITALS
HEIGHT: 66 IN | TEMPERATURE: 99.3 F | WEIGHT: 155.2 LBS | SYSTOLIC BLOOD PRESSURE: 138 MMHG | OXYGEN SATURATION: 98 % | HEART RATE: 82 BPM | BODY MASS INDEX: 24.94 KG/M2 | DIASTOLIC BLOOD PRESSURE: 88 MMHG

## 2017-06-27 DIAGNOSIS — I10 ESSENTIAL HYPERTENSION: Primary | ICD-10-CM

## 2017-06-27 PROCEDURE — 99213 OFFICE O/P EST LOW 20 MIN: CPT | Performed by: FAMILY MEDICINE

## 2017-06-27 NOTE — PROGRESS NOTES
"Naina Nigel    1950    Chief Complaint   Patient presents with   • Hypertension       Vitals:    06/27/17 0824   BP: 138/88   BP Location: Right arm   Patient Position: Sitting   Cuff Size: Adult   Pulse: 82   Temp: 99.3 °F (37.4 °C)   TempSrc: Oral   SpO2: 98%   Weight: 155 lb 3.2 oz (70.4 kg)   Height: 66\" (167.6 cm)       Hypertension   This is a chronic problem. The current episode started more than 1 year ago. The problem has been gradually improving since onset. The problem is resistant. Associated symptoms include anxiety. Pertinent negatives include no chest pain or headaches. There are no associated agents to hypertension. Risk factors for coronary artery disease include dyslipidemia and post-menopausal state. Past treatments include angiotensin blockers, diuretics and calcium channel blockers. The current treatment provides moderate improvement. There are no compliance problems.        Review of Systems   Cardiovascular: Negative for chest pain and leg swelling.   Neurological: Negative for headaches.       Past Medical History:   Diagnosis Date   • Hyperlipidemia    • Hypertension    • Hypothyroidism          Current Outpatient Prescriptions:   •  albuterol (PROVENTIL HFA;VENTOLIN HFA) 108 (90 BASE) MCG/ACT inhaler, Inhale 2 puffs Every 4 (Four) Hours As Needed for wheezing., Disp: 3.7 g, Rfl: 2  •  alendronate (FOSAMAX) 70 MG tablet, , Disp: , Rfl:   •  amLODIPine (NORVASC) 10 MG tablet, Take 1 tablet by mouth Daily., Disp: 90 tablet, Rfl: 2  •  aspirin 81 MG EC tablet, Take 81 mg by mouth Daily., Disp: , Rfl:   •  cetirizine (zyrTEC) 10 MG tablet, Take 10 mg by mouth Daily., Disp: , Rfl:   •  Cholecalciferol (VITAMIN D3) 5000 UNITS capsule capsule, Take 5,000 Units by mouth Daily., Disp: , Rfl:   •  fluticasone (FLONASE) 50 MCG/ACT nasal spray, 2 sprays into each nostril Daily., Disp: 16 g, Rfl: 3  •  indapamide (LOZOL) 2.5 MG tablet, Take 1 tablet by mouth Daily., Disp: 90 tablet, Rfl: 2  •  " levothyroxine (SYNTHROID, LEVOTHROID) 50 MCG tablet, Take 1 tablet by mouth Daily., Disp: 90 tablet, Rfl: 2  •  losartan (COZAAR) 100 MG tablet, Take 1 tablet by mouth Daily., Disp: 90 tablet, Rfl: 2  •  Multiple Vitamins-Minerals (MULTIVITAMIN ADULTS 50+ PO), Take  by mouth., Disp: , Rfl:   •  omeprazole (priLOSEC) 40 MG capsule, Take 1 capsule by mouth Daily., Disp: 90 capsule, Rfl: 2  •  potassium chloride (KLOR-CON) 20 MEQ packet, Take 20 mEq by mouth 2 (Two) Times a Day., Disp: 60 packet, Rfl: 2  •  pravastatin (PRAVACHOL) 40 MG tablet, Take 1 tablet by mouth Every Night., Disp: 90 tablet, Rfl: 2  •  hydrOXYzine (ATARAX) 25 MG tablet, One or 2 every 4-6 hours as needed for anxiety and nausea, Disp: 60 tablet, Rfl: 2    Allergies   Allergen Reactions   • Sulfa Antibiotics Hives   • Penicillins    • Prednisone        There is no problem list on file for this patient.      Social History     Social History   • Marital status:      Spouse name: N/A   • Number of children: N/A   • Years of education: N/A     Social History Main Topics   • Smoking status: Never Smoker   • Smokeless tobacco: Never Used   • Alcohol use Yes      Comment: occ   • Drug use: No   • Sexual activity: Defer     Other Topics Concern   • None     Social History Narrative       Past Surgical History:   Procedure Laterality Date   • COLONOSCOPY  10/04/2016    [facility] – [place] - DR KING   • EYE SURGERY     • TUBAL ABDOMINAL LIGATION         Physical Exam   Constitutional: She is oriented to person, place, and time. She appears well-developed and well-nourished.   Cardiovascular: Normal rate and regular rhythm.    Pulmonary/Chest: Effort normal and breath sounds normal.   Musculoskeletal: She exhibits no edema.   Neurological: She is alert and oriented to person, place, and time.   Psychiatric: She has a normal mood and affect. Her behavior is normal. Thought content normal.   Vitals reviewed.      Vitals:    06/27/17 0824   BP: 138/88   BP Location:  "Right arm   Patient Position: Sitting   Cuff Size: Adult   Pulse: 82   Temp: 99.3 °F (37.4 °C)   TempSrc: Oral   SpO2: 98%   Weight: 155 lb 3.2 oz (70.4 kg)   Height: 66\" (167.6 cm)       Naina was seen today for hypertension.    Diagnoses and all orders for this visit:    Essential hypertension        Problem List Items Addressed This Visit     None      Visit Diagnoses     Essential hypertension    -  Primary          Plan: INDAPAMIDE AS MUCH SHE CAN-ADD CLONIDINE 0.1 EACH MORNING TO HER OTHER MEDS CHECK BACK 2 WEEKS AND A BMP AND BLOOD PRESSURE CHECK IN 2 WEEKS                            Juan Luis Zafar MD  6/27/2017  "

## 2017-07-11 ENCOUNTER — OFFICE VISIT (OUTPATIENT)
Dept: FAMILY MEDICINE CLINIC | Facility: CLINIC | Age: 67
End: 2017-07-11

## 2017-07-11 VITALS
SYSTOLIC BLOOD PRESSURE: 122 MMHG | DIASTOLIC BLOOD PRESSURE: 78 MMHG | TEMPERATURE: 98.6 F | HEIGHT: 66 IN | HEART RATE: 87 BPM | BODY MASS INDEX: 25.71 KG/M2 | OXYGEN SATURATION: 97 % | WEIGHT: 160 LBS

## 2017-07-11 DIAGNOSIS — Z23 NEED FOR PROPHYLACTIC VACCINATION AGAINST STREPTOCOCCUS PNEUMONIAE (PNEUMOCOCCUS): ICD-10-CM

## 2017-07-11 DIAGNOSIS — E87.1 HYPONATREMIA: Primary | ICD-10-CM

## 2017-07-11 LAB
BUN SERPL-MCNC: 18 MG/DL (ref 5–21)
BUN/CREAT SERPL: 24.7 (ref 7–25)
CALCIUM SERPL-MCNC: 9.5 MG/DL (ref 8.4–10.4)
CHLORIDE SERPL-SCNC: 100 MMOL/L (ref 98–110)
CO2 SERPL-SCNC: 27 MMOL/L (ref 24–31)
CREAT SERPL-MCNC: 0.73 MG/DL (ref 0.5–1.4)
GLUCOSE SERPL-MCNC: 92 MG/DL (ref 70–100)
POTASSIUM SERPL-SCNC: 4.7 MMOL/L (ref 3.5–5.3)
SODIUM SERPL-SCNC: 138 MMOL/L (ref 135–145)

## 2017-07-11 PROCEDURE — G0009 ADMIN PNEUMOCOCCAL VACCINE: HCPCS | Performed by: FAMILY MEDICINE

## 2017-07-11 PROCEDURE — 99213 OFFICE O/P EST LOW 20 MIN: CPT | Performed by: FAMILY MEDICINE

## 2017-07-11 PROCEDURE — 90670 PCV13 VACCINE IM: CPT | Performed by: FAMILY MEDICINE

## 2017-07-11 RX ORDER — CLONIDINE HYDROCHLORIDE 0.2 MG/1
TABLET ORAL
COMMUNITY
Start: 2017-05-03 | End: 2017-10-02 | Stop reason: SDUPTHER

## 2017-07-11 NOTE — PROGRESS NOTES
"Naina Nigel    1950    Chief Complaint   Patient presents with   • Follow-up     2 week follow up       Vitals:    07/11/17 0901   BP: 122/78   BP Location: Right arm   Patient Position: Sitting   Cuff Size: Adult   Pulse: 87   Temp: 98.6 °F (37 °C)   TempSrc: Oral   SpO2: 97%   Weight: 160 lb (72.6 kg)   Height: 66\" (167.6 cm)       Hypertension   This is a chronic problem. The current episode started more than 1 year ago. The problem has been gradually improving since onset. The problem is controlled. Pertinent negatives include no chest pain or shortness of breath. There are no associated agents to hypertension. Risk factors for coronary artery disease include dyslipidemia, family history and post-menopausal state. Past treatments include angiotensin blockers, central alpha agonists and calcium channel blockers. The current treatment provides significant improvement. There are no compliance problems.        Review of Systems   Respiratory: Negative for cough and shortness of breath.    Cardiovascular: Negative for chest pain and leg swelling.       Past Medical History:   Diagnosis Date   • Hyperlipidemia    • Hypertension    • Hypothyroidism          Current Outpatient Prescriptions:   •  albuterol (PROVENTIL HFA;VENTOLIN HFA) 108 (90 BASE) MCG/ACT inhaler, Inhale 2 puffs Every 4 (Four) Hours As Needed for wheezing., Disp: 3.7 g, Rfl: 2  •  alendronate (FOSAMAX) 70 MG tablet, , Disp: , Rfl:   •  amLODIPine (NORVASC) 10 MG tablet, Take 1 tablet by mouth Daily., Disp: 90 tablet, Rfl: 2  •  aspirin 81 MG EC tablet, Take 81 mg by mouth Daily., Disp: , Rfl:   •  cetirizine (zyrTEC) 10 MG tablet, Take 10 mg by mouth Daily., Disp: , Rfl:   •  Cholecalciferol (VITAMIN D3) 5000 UNITS capsule capsule, Take 5,000 Units by mouth Daily., Disp: , Rfl:   •  CloNIDine (CATAPRES) 0.2 MG tablet, , Disp: , Rfl:   •  fluticasone (FLONASE) 50 MCG/ACT nasal spray, 2 sprays into each nostril Daily., Disp: 16 g, Rfl: 3  •  " hydrOXYzine (ATARAX) 25 MG tablet, One or 2 every 4-6 hours as needed for anxiety and nausea, Disp: 60 tablet, Rfl: 2  •  levothyroxine (SYNTHROID, LEVOTHROID) 50 MCG tablet, Take 1 tablet by mouth Daily., Disp: 90 tablet, Rfl: 2  •  losartan (COZAAR) 100 MG tablet, Take 1 tablet by mouth Daily., Disp: 90 tablet, Rfl: 2  •  Multiple Vitamins-Minerals (MULTIVITAMIN ADULTS 50+ PO), Take  by mouth., Disp: , Rfl:   •  omeprazole (priLOSEC) 40 MG capsule, Take 1 capsule by mouth Daily., Disp: 90 capsule, Rfl: 2  •  potassium chloride (KLOR-CON) 20 MEQ packet, Take 20 mEq by mouth 2 (Two) Times a Day., Disp: 60 packet, Rfl: 2  •  pravastatin (PRAVACHOL) 40 MG tablet, Take 1 tablet by mouth Every Night., Disp: 90 tablet, Rfl: 2    Allergies   Allergen Reactions   • Sulfa Antibiotics Hives   • Penicillins    • Prednisone        There is no problem list on file for this patient.      Social History     Social History   • Marital status:      Spouse name: N/A   • Number of children: N/A   • Years of education: N/A     Social History Main Topics   • Smoking status: Never Smoker   • Smokeless tobacco: Never Used   • Alcohol use Yes      Comment: occ   • Drug use: No   • Sexual activity: Defer     Other Topics Concern   • None     Social History Narrative       Past Surgical History:   Procedure Laterality Date   • COLONOSCOPY  10/04/2016    [facility] [place] KING   • EYE SURGERY     • TUBAL ABDOMINAL LIGATION         Physical Exam   Constitutional: She is oriented to person, place, and time. She appears well-developed and well-nourished.   Cardiovascular: Normal rate and regular rhythm.    Pulmonary/Chest: Effort normal and breath sounds normal.   Musculoskeletal: She exhibits no edema.   Neurological: She is alert and oriented to person, place, and time.   Skin: Skin is warm and dry.   Psychiatric: She has a normal mood and affect. Her behavior is normal. Judgment and thought content normal.   Vitals reviewed.      Vitals:     "07/11/17 0901   BP: 122/78   BP Location: Right arm   Patient Position: Sitting   Cuff Size: Adult   Pulse: 87   Temp: 98.6 °F (37 °C)   TempSrc: Oral   SpO2: 97%   Weight: 160 lb (72.6 kg)   Height: 66\" (167.6 cm)       Naina was seen today for follow-up.    Diagnoses and all orders for this visit:    Hyponatremia  -     Basic Metabolic Panel    Need for prophylactic vaccination against Streptococcus pneumoniae (pneumococcus)  -     Pneumococcal Conjugate Vaccine 13-Valent All        Problem List Items Addressed This Visit     None      Visit Diagnoses     Hyponatremia    -  Primary    Relevant Orders    Basic Metabolic Panel    Need for prophylactic vaccination against Streptococcus pneumoniae (pneumococcus)        Relevant Orders    Pneumococcal Conjugate Vaccine 13-Valent All                Plan continue-working out 6 days a week-see in 3 months-- above                      Juan Luis Zafar MD  7/11/2017  "

## 2017-07-12 ENCOUNTER — TELEPHONE (OUTPATIENT)
Dept: FAMILY MEDICINE CLINIC | Facility: CLINIC | Age: 67
End: 2017-07-12

## 2017-07-12 NOTE — TELEPHONE ENCOUNTER
----- Message from Juan Luis Zafar MD sent at 7/12/2017  7:09 AM CDT -----  Call–sodium potassium etc. normal–C3 months

## 2017-09-18 RX ORDER — FLUCONAZOLE 150 MG/1
TABLET ORAL
Qty: 2 TABLET | Refills: 0 | Status: SHIPPED | OUTPATIENT
Start: 2017-09-18 | End: 2017-10-27

## 2017-09-18 NOTE — TELEPHONE ENCOUNTER
Patient is having issues with tongue and throat again like she did before when had thrush.  Can you send in rx or do you need to see.        Diflucan 150 now and again Thursday

## 2017-09-28 ENCOUNTER — CLINICAL SUPPORT (OUTPATIENT)
Dept: FAMILY MEDICINE CLINIC | Facility: CLINIC | Age: 67
End: 2017-09-28

## 2017-09-28 DIAGNOSIS — Z23 NEED FOR INFLUENZA VACCINATION: Primary | ICD-10-CM

## 2017-09-28 PROCEDURE — 90662 IIV NO PRSV INCREASED AG IM: CPT | Performed by: FAMILY MEDICINE

## 2017-09-28 PROCEDURE — G0008 ADMIN INFLUENZA VIRUS VAC: HCPCS | Performed by: FAMILY MEDICINE

## 2017-10-02 RX ORDER — AMLODIPINE BESYLATE 10 MG/1
10 TABLET ORAL DAILY
Qty: 90 TABLET | Refills: 2 | Status: SHIPPED | OUTPATIENT
Start: 2017-10-02 | End: 2018-08-15 | Stop reason: SDUPTHER

## 2017-10-02 RX ORDER — LOSARTAN POTASSIUM 100 MG/1
100 TABLET ORAL EVERY MORNING
Qty: 90 TABLET | Refills: 2 | Status: SHIPPED | OUTPATIENT
Start: 2017-10-02 | End: 2018-08-15 | Stop reason: SDUPTHER

## 2017-10-02 RX ORDER — LEVOTHYROXINE SODIUM 0.05 MG/1
50 TABLET ORAL DAILY
Qty: 90 TABLET | Refills: 2 | Status: SHIPPED | OUTPATIENT
Start: 2017-10-02 | End: 2018-08-13 | Stop reason: SDUPTHER

## 2017-10-02 RX ORDER — PRAVASTATIN SODIUM 40 MG
40 TABLET ORAL NIGHTLY
Qty: 90 TABLET | Refills: 2 | Status: SHIPPED | OUTPATIENT
Start: 2017-10-02 | End: 2018-08-13 | Stop reason: SDUPTHER

## 2017-10-02 RX ORDER — CLONIDINE HYDROCHLORIDE 0.2 MG/1
0.1 TABLET ORAL EVERY MORNING
Qty: 90 TABLET | Refills: 2 | Status: SHIPPED | OUTPATIENT
Start: 2017-10-02 | End: 2018-05-04 | Stop reason: SDUPTHER

## 2017-10-11 ENCOUNTER — OFFICE VISIT (OUTPATIENT)
Dept: FAMILY MEDICINE CLINIC | Facility: CLINIC | Age: 67
End: 2017-10-11

## 2017-10-11 VITALS
SYSTOLIC BLOOD PRESSURE: 140 MMHG | BODY MASS INDEX: 25.71 KG/M2 | WEIGHT: 160 LBS | HEIGHT: 66 IN | DIASTOLIC BLOOD PRESSURE: 74 MMHG | TEMPERATURE: 98.2 F

## 2017-10-11 DIAGNOSIS — I10 HYPERTENSION, UNSPECIFIED TYPE: Primary | ICD-10-CM

## 2017-10-11 DIAGNOSIS — K21.9 GASTROESOPHAGEAL REFLUX DISEASE, ESOPHAGITIS PRESENCE NOT SPECIFIED: ICD-10-CM

## 2017-10-11 LAB
BASOPHILS # BLD AUTO: 0.06 10*3/MM3 (ref 0–0.2)
BASOPHILS NFR BLD AUTO: 0.6 % (ref 0–2)
BUN SERPL-MCNC: 15 MG/DL (ref 5–21)
BUN/CREAT SERPL: 22.1 (ref 7–25)
CALCIUM SERPL-MCNC: 9.7 MG/DL (ref 8.4–10.4)
CHLORIDE SERPL-SCNC: 102 MMOL/L (ref 98–110)
CO2 SERPL-SCNC: 29 MMOL/L (ref 24–31)
CREAT SERPL-MCNC: 0.68 MG/DL (ref 0.5–1.4)
EOSINOPHIL # BLD AUTO: 0.16 10*3/MM3 (ref 0–0.7)
EOSINOPHIL NFR BLD AUTO: 1.7 % (ref 0–4)
ERYTHROCYTE [DISTWIDTH] IN BLOOD BY AUTOMATED COUNT: 13.1 % (ref 12–15)
GLUCOSE SERPL-MCNC: 89 MG/DL (ref 70–100)
HCT VFR BLD AUTO: 44 % (ref 37–47)
HGB BLD-MCNC: 14.4 G/DL (ref 12–16)
IMM GRANULOCYTES # BLD: 0.04 10*3/MM3 (ref 0–0.03)
IMM GRANULOCYTES NFR BLD: 0.4 % (ref 0–5)
LYMPHOCYTES # BLD AUTO: 2.22 10*3/MM3 (ref 0.72–4.86)
LYMPHOCYTES NFR BLD AUTO: 23.7 % (ref 15–45)
MCH RBC QN AUTO: 29.2 PG (ref 28–32)
MCHC RBC AUTO-ENTMCNC: 32.7 G/DL (ref 33–36)
MCV RBC AUTO: 89.2 FL (ref 82–98)
MONOCYTES # BLD AUTO: 0.57 10*3/MM3 (ref 0.19–1.3)
MONOCYTES NFR BLD AUTO: 6.1 % (ref 4–12)
NEUTROPHILS # BLD AUTO: 6.32 10*3/MM3 (ref 1.87–8.4)
NEUTROPHILS NFR BLD AUTO: 67.5 % (ref 39–78)
PLATELET # BLD AUTO: 421 10*3/MM3 (ref 130–400)
POTASSIUM SERPL-SCNC: 5 MMOL/L (ref 3.5–5.3)
RBC # BLD AUTO: 4.93 10*6/MM3 (ref 4.2–5.4)
SODIUM SERPL-SCNC: 141 MMOL/L (ref 135–145)
WBC # BLD AUTO: 9.37 10*3/MM3 (ref 4.8–10.8)

## 2017-10-11 PROCEDURE — 99213 OFFICE O/P EST LOW 20 MIN: CPT | Performed by: FAMILY MEDICINE

## 2017-10-11 NOTE — PROGRESS NOTES
Subjective   Naina Manning is a 67 y.o. female.     Heartburn   She complains of abdominal pain, belching, early satiety, heartburn and nausea. She reports no chest pain or no wheezing. This is a recurrent problem. The current episode started 1 to 4 weeks ago. The problem occurs frequently. The problem has been unchanged. The heartburn is of moderate intensity. The heartburn does not wake her from sleep. The heartburn does not limit her activity. The heartburn doesn't change with position. The symptoms are aggravated by certain foods. Risk factors include hiatal hernia. She has tried an antacid and a PPI for the symptoms. The treatment provided mild relief.       The following portions of the patient's history were reviewed and updated as appropriate: allergies, current medications, past family history, past medical history, past social history, past surgical history and problem list.    Review of Systems   Respiratory: Negative for shortness of breath and wheezing.    Cardiovascular: Negative for chest pain and leg swelling.   Gastrointestinal: Positive for abdominal pain, heartburn and nausea.       Objective   Physical Exam   Constitutional: She is oriented to person, place, and time. She appears well-developed and well-nourished.   Cardiovascular: Normal rate and regular rhythm.    Pulmonary/Chest: Effort normal and breath sounds normal.   Abdominal: Soft. She exhibits no mass. There is tenderness. No hernia.   Neurological: She is alert and oriented to person, place, and time.   Psychiatric: She has a normal mood and affect. Her behavior is normal.   Nursing note and vitals reviewed.      Assessment/Plan   Naina was seen today for follow-up.    Diagnoses and all orders for this visit:    Hypertension, unspecified type  -     CBC & Differential  -     Basic Metabolic Panel    Gastroesophageal reflux disease, esophagitis presence not specified  -     Ambulatory Referral to General Surgery       Plan-referral for  EGD, rule out esophageal Monilia, H. pylori,

## 2017-10-20 DIAGNOSIS — R91.8 PULMONARY NODULES: Primary | ICD-10-CM

## 2017-10-24 LAB
ANNOTATION COMMENT IMP: NORMAL
GAMMA INTERFERON BACKGROUND BLD IA-ACNC: 0.05 IU/ML
M TB IFN-G BLD-IMP: NEGATIVE
M TB IFN-G CD4+ BCKGRND COR BLD-ACNC: 0.02 IU/ML
M TB IFN-G CD4+ T-CELLS BLD-ACNC: 0.07 IU/ML
MITOGEN IGNF BLD-ACNC: 9.91 IU/ML
QUANTIFERON INCUBATION: NORMAL
SERVICE CMNT-IMP: NORMAL

## 2017-10-27 ENCOUNTER — OFFICE VISIT (OUTPATIENT)
Dept: FAMILY MEDICINE CLINIC | Facility: CLINIC | Age: 67
End: 2017-10-27

## 2017-10-27 VITALS
TEMPERATURE: 99.8 F | WEIGHT: 162 LBS | DIASTOLIC BLOOD PRESSURE: 84 MMHG | BODY MASS INDEX: 26.03 KG/M2 | SYSTOLIC BLOOD PRESSURE: 124 MMHG | HEIGHT: 66 IN

## 2017-10-27 DIAGNOSIS — I10 HYPERTENSION, UNSPECIFIED TYPE: Primary | ICD-10-CM

## 2017-10-27 PROCEDURE — 99213 OFFICE O/P EST LOW 20 MIN: CPT | Performed by: FAMILY MEDICINE

## 2017-10-27 NOTE — PROGRESS NOTES
Subjective   Naina Manning is a 67 y.o. female.     Hypertension   This is a chronic problem. The current episode started more than 1 year ago. The problem is unchanged. The problem is resistant. Associated symptoms include headaches. Pertinent negatives include no chest pain. There are no associated agents to hypertension. Risk factors for coronary artery disease include dyslipidemia, family history and post-menopausal state. Past treatments include angiotensin blockers, calcium channel blockers and central alpha agonists. The current treatment provides moderate improvement. There are no compliance problems.        The following portions of the patient's history were reviewed and updated as appropriate: allergies, current medications, past family history, past medical history, past social history, past surgical history and problem list.    Review of Systems   Cardiovascular: Negative for chest pain and leg swelling.   Neurological: Positive for headaches.       Objective   Physical Exam   Constitutional: She is oriented to person, place, and time. She appears well-developed and well-nourished.   Cardiovascular: Normal rate and regular rhythm.    Pulmonary/Chest: Effort normal and breath sounds normal.   Neurological: She is alert and oriented to person, place, and time.   No neurologic focality   Skin: Skin is warm and dry.   Psychiatric: She has a normal mood and affect. Her behavior is normal.   Vitals reviewed.      Assessment/Plan   Naina was seen today for hypertension.    Diagnoses and all orders for this visit:    Hypertension, unspecified type              blood pressure much improved-we increased her clonidine 0.2 mg to a half twice a day-imminent cholecystectomy for gallbladder disease

## 2017-11-13 ENCOUNTER — OFFICE VISIT (OUTPATIENT)
Dept: FAMILY MEDICINE CLINIC | Facility: CLINIC | Age: 67
End: 2017-11-13

## 2017-11-13 VITALS
HEART RATE: 90 BPM | DIASTOLIC BLOOD PRESSURE: 84 MMHG | WEIGHT: 161.8 LBS | TEMPERATURE: 98.7 F | BODY MASS INDEX: 26 KG/M2 | SYSTOLIC BLOOD PRESSURE: 160 MMHG | HEIGHT: 66 IN | OXYGEN SATURATION: 98 %

## 2017-11-13 DIAGNOSIS — I10 HYPERTENSION, UNSPECIFIED TYPE: Primary | ICD-10-CM

## 2017-11-13 PROCEDURE — 99213 OFFICE O/P EST LOW 20 MIN: CPT | Performed by: FAMILY MEDICINE

## 2017-11-13 NOTE — PROGRESS NOTES
Subjective   Naina Manning is a 67 y.o. female.     Hypertension   This is a chronic problem. The current episode started more than 1 year ago. The problem has been waxing and waning since onset. The problem is uncontrolled. Associated symptoms include headaches. Pertinent negatives include no chest pain. There are no associated agents to hypertension. Risk factors for coronary artery disease include dyslipidemia and post-menopausal state. Past treatments include angiotensin blockers, calcium channel blockers and central alpha agonists. The current treatment provides mild improvement. There are no compliance problems.        The following portions of the patient's history were reviewed and updated as appropriate: allergies, current medications, past family history, past medical history, past social history, past surgical history and problem list.    Review of Systems   Cardiovascular: Negative for chest pain and leg swelling.   Gastrointestinal:        Recent cholecystectomy   Neurological: Positive for headaches.       Objective   Physical Exam   Constitutional: She is oriented to person, place, and time.   Cardiovascular: Normal rate and regular rhythm.    Pulmonary/Chest: Breath sounds normal.   Musculoskeletal: She exhibits no edema.   Neurological: She is alert and oriented to person, place, and time.   Psychiatric: She has a normal mood and affect. Her behavior is normal. Thought content normal.       Assessment/Plan   Naina was seen today for hypertension.    Diagnoses and all orders for this visit:    Hypertension, unspecified type           Plan-seeing cardiologist Friday-may need further workup with variability of blood pressure the upper extremities etc. i.e.?  Renal artery stenosis

## 2017-12-05 ENCOUNTER — TELEPHONE (OUTPATIENT)
Dept: FAMILY MEDICINE CLINIC | Facility: CLINIC | Age: 67
End: 2017-12-05

## 2017-12-05 NOTE — TELEPHONE ENCOUNTER
Dr. Carranza is out until next week.  Patient was advised to contact you.  Dr. Carranza started patient on clonidine 0.2 mg bid and indapamide 1.25 mg daily.  Since she started new meds mouth dry, tongue feels thick-worse in evening, not sleeping.      Transcribed from paper.

## 2017-12-05 NOTE — TELEPHONE ENCOUNTER
Patient was called and advised per Dr. Zafar this is a side effect from clonidine, stimulate salivation, sugar free gum.        Transcribed from paper.

## 2017-12-26 ENCOUNTER — CLINICAL SUPPORT (OUTPATIENT)
Dept: FAMILY MEDICINE CLINIC | Facility: CLINIC | Age: 67
End: 2017-12-26

## 2017-12-26 DIAGNOSIS — N39.0 URINARY TRACT INFECTION WITHOUT HEMATURIA, SITE UNSPECIFIED: Primary | ICD-10-CM

## 2017-12-26 LAB
BILIRUB BLD-MCNC: NEGATIVE MG/DL
CLARITY, POC: CLEAR
COLOR UR: YELLOW
GLUCOSE UR STRIP-MCNC: NEGATIVE MG/DL
KETONES UR QL: NEGATIVE
LEUKOCYTE EST, POC: NEGATIVE
NITRITE UR-MCNC: NEGATIVE MG/ML
PH UR: 5.5 [PH] (ref 5–8)
PROT UR STRIP-MCNC: NEGATIVE MG/DL
RBC # UR STRIP: NEGATIVE /UL
SP GR UR: 1.01 (ref 1–1.03)
UROBILINOGEN UR QL: NORMAL

## 2017-12-26 PROCEDURE — 81003 URINALYSIS AUTO W/O SCOPE: CPT | Performed by: FAMILY MEDICINE

## 2018-03-19 RX ORDER — ALBUTEROL SULFATE 90 MCG
HFA AEROSOL WITH ADAPTER (GRAM) INHALATION
Qty: 18 G | Refills: 2 | Status: SHIPPED | OUTPATIENT
Start: 2018-03-19 | End: 2018-10-17

## 2018-03-29 ENCOUNTER — OFFICE VISIT (OUTPATIENT)
Dept: FAMILY MEDICINE CLINIC | Facility: CLINIC | Age: 68
End: 2018-03-29

## 2018-03-29 VITALS
HEART RATE: 89 BPM | HEIGHT: 66 IN | WEIGHT: 167.4 LBS | DIASTOLIC BLOOD PRESSURE: 83 MMHG | SYSTOLIC BLOOD PRESSURE: 119 MMHG | TEMPERATURE: 100.3 F | BODY MASS INDEX: 26.9 KG/M2 | OXYGEN SATURATION: 98 %

## 2018-03-29 DIAGNOSIS — J01.00 ACUTE NON-RECURRENT MAXILLARY SINUSITIS: Primary | ICD-10-CM

## 2018-03-29 PROCEDURE — 99213 OFFICE O/P EST LOW 20 MIN: CPT | Performed by: FAMILY MEDICINE

## 2018-03-29 PROCEDURE — 96372 THER/PROPH/DIAG INJ SC/IM: CPT | Performed by: FAMILY MEDICINE

## 2018-03-29 RX ORDER — AZITHROMYCIN 500 MG/1
500 TABLET, FILM COATED ORAL DAILY
Qty: 3 TABLET | Refills: 0 | Status: SHIPPED | OUTPATIENT
Start: 2018-03-29 | End: 2018-04-01

## 2018-03-29 RX ORDER — METHYLPREDNISOLONE ACETATE 40 MG/ML
40 INJECTION, SUSPENSION INTRA-ARTICULAR; INTRALESIONAL; INTRAMUSCULAR; SOFT TISSUE ONCE
Status: COMPLETED | OUTPATIENT
Start: 2018-03-29 | End: 2018-03-29

## 2018-03-29 RX ORDER — INDAPAMIDE 2.5 MG/1
2.5 TABLET, FILM COATED ORAL DAILY
COMMUNITY
Start: 2018-02-19 | End: 2019-04-08 | Stop reason: SDUPTHER

## 2018-03-29 RX ORDER — MELATONIN
1000 DAILY
COMMUNITY

## 2018-03-29 RX ORDER — CLONIDINE HYDROCHLORIDE 0.1 MG/1
0.1 TABLET ORAL NIGHTLY
COMMUNITY
Start: 2018-02-26 | End: 2018-05-04 | Stop reason: SDUPTHER

## 2018-03-29 RX ADMIN — METHYLPREDNISOLONE ACETATE 40 MG: 40 INJECTION, SUSPENSION INTRA-ARTICULAR; INTRALESIONAL; INTRAMUSCULAR; SOFT TISSUE at 10:05

## 2018-03-29 NOTE — PROGRESS NOTES
Subjective   Naina Manning is a 68 y.o. female.     Sinusitis   This is a new problem. The current episode started in the past 7 days. The problem is unchanged. The maximum temperature recorded prior to her arrival was 100.4 - 100.9 F. The fever has been present for 1 to 2 days. Associated symptoms include congestion, coughing, sinus pressure and sneezing. Past treatments include saline sprays. The treatment provided no relief.       The following portions of the patient's history were reviewed and updated as appropriate: allergies, current medications, past family history, past medical history, past social history, past surgical history and problem list.    Review of Systems   HENT: Positive for congestion, sinus pressure and sneezing.    Respiratory: Positive for cough.        Objective   Physical Exam   Constitutional: She is oriented to person, place, and time. She appears well-developed and well-nourished.   HENT:   Right Ear: External ear normal.   Left Ear: External ear normal.   Mouth/Throat: Oropharyngeal exudate present.   Cardiovascular: Normal rate and regular rhythm.    Pulmonary/Chest: Effort normal and breath sounds normal. She has no wheezes. She has no rales.   Lymphadenopathy:     She has no cervical adenopathy.   Neurological: She is alert and oriented to person, place, and time.   Psychiatric: She has a normal mood and affect. Her behavior is normal. Thought content normal.   Nursing note and vitals reviewed.      Assessment/Plan   Naina was seen today for nasal congestion, headache and cough.    Diagnoses and all orders for this visit:    Acute non-recurrent maxillary sinusitis  -     methylPREDNISolone acetate (DEPO-medrol) injection 40 mg; Inject 1 mL into the shoulder, thigh, or buttocks 1 (One) Time.    Other orders  -     azithromycin (ZITHROMAX) 500 MG tablet; Take 1 tablet by mouth Daily for 3 days.         Plan-above plus sinus lavage

## 2018-04-17 ENCOUNTER — OFFICE VISIT (OUTPATIENT)
Dept: FAMILY MEDICINE CLINIC | Facility: CLINIC | Age: 68
End: 2018-04-17

## 2018-04-17 VITALS
HEIGHT: 66 IN | TEMPERATURE: 98 F | HEART RATE: 73 BPM | BODY MASS INDEX: 26.68 KG/M2 | SYSTOLIC BLOOD PRESSURE: 120 MMHG | OXYGEN SATURATION: 98 % | DIASTOLIC BLOOD PRESSURE: 74 MMHG | WEIGHT: 166 LBS

## 2018-04-17 DIAGNOSIS — Z12.31 ENCOUNTER FOR SCREENING MAMMOGRAM FOR MALIGNANT NEOPLASM OF BREAST: ICD-10-CM

## 2018-04-17 DIAGNOSIS — E55.9 VITAMIN D DEFICIENCY: ICD-10-CM

## 2018-04-17 DIAGNOSIS — Z00.00 ANNUAL PHYSICAL EXAM: ICD-10-CM

## 2018-04-17 DIAGNOSIS — E78.2 MIXED HYPERLIPIDEMIA: ICD-10-CM

## 2018-04-17 DIAGNOSIS — Z12.12 SCREENING FOR MALIGNANT NEOPLASM OF THE RECTUM: ICD-10-CM

## 2018-04-17 DIAGNOSIS — Z12.4 SCREENING FOR MALIGNANT NEOPLASM OF CERVIX: ICD-10-CM

## 2018-04-17 DIAGNOSIS — R53.83 FATIGUE, UNSPECIFIED TYPE: Primary | ICD-10-CM

## 2018-04-17 DIAGNOSIS — N95.1 SYMPTOMATIC MENOPAUSAL OR FEMALE CLIMACTERIC STATES: ICD-10-CM

## 2018-04-17 PROBLEM — J30.2 SEASONAL ALLERGIC RHINITIS: Status: ACTIVE | Noted: 2018-04-17

## 2018-04-17 PROBLEM — I10 ESSENTIAL HYPERTENSION: Status: ACTIVE | Noted: 2018-04-17

## 2018-04-17 PROBLEM — E03.9 HYPOTHYROID: Status: ACTIVE | Noted: 2018-04-17

## 2018-04-17 PROBLEM — E03.9 HYPOTHYROIDISM: Status: ACTIVE | Noted: 2018-04-17

## 2018-04-17 PROBLEM — I10 HYPERTENSION: Status: ACTIVE | Noted: 2018-04-17

## 2018-04-17 PROBLEM — IMO0002 BODY MASS INDEX (BMI) OF 25.0 TO 29.9: Status: ACTIVE | Noted: 2018-04-17

## 2018-04-17 LAB
BILIRUB BLD-MCNC: NEGATIVE MG/DL
CLARITY, POC: CLEAR
COLOR UR: YELLOW
GLUCOSE UR STRIP-MCNC: NEGATIVE MG/DL
KETONES UR QL: NEGATIVE
LEUKOCYTE EST, POC: NEGATIVE
NITRITE UR-MCNC: NEGATIVE MG/ML
PH UR: 7.5 [PH] (ref 5–8)
PROT UR STRIP-MCNC: NEGATIVE MG/DL
RBC # UR STRIP: NEGATIVE /UL
SP GR UR: 1.01 (ref 1–1.03)
UROBILINOGEN UR QL: NORMAL

## 2018-04-17 PROCEDURE — 81003 URINALYSIS AUTO W/O SCOPE: CPT | Performed by: FAMILY MEDICINE

## 2018-04-17 PROCEDURE — G0439 PPPS, SUBSEQ VISIT: HCPCS | Performed by: FAMILY MEDICINE

## 2018-04-17 PROCEDURE — 99213 OFFICE O/P EST LOW 20 MIN: CPT | Performed by: FAMILY MEDICINE

## 2018-04-17 NOTE — PATIENT INSTRUCTIONS
Exercising to Stay Healthy  Exercising regularly is important. It has many health benefits, such as:  · Improving your overall fitness, flexibility, and endurance.  · Increasing your bone density.  · Helping with weight control.  · Decreasing your body fat.  · Increasing your muscle strength.  · Reducing stress and tension.  · Improving your overall health.  In order to become healthy and stay healthy, it is recommended that you do moderate-intensity and vigorous-intensity exercise. You can tell that you are exercising at a moderate intensity if you have a higher heart rate and faster breathing, but you are still able to hold a conversation. You can tell that you are exercising at a vigorous intensity if you are breathing much harder and faster and cannot hold a conversation while exercising.  How often should I exercise?  Choose an activity that you enjoy and set realistic goals. Your health care provider can help you to make an activity plan that works for you. Exercise regularly as directed by your health care provider. This may include:  · Doing resistance training twice each week, such as:  ¨ Push-ups.  ¨ Sit-ups.  ¨ Lifting weights.  ¨ Using resistance bands.  · Doing a given intensity of exercise for a given amount of time. Choose from these options:  ¨ 150 minutes of moderate-intensity exercise every week.  ¨ 75 minutes of vigorous-intensity exercise every week.  ¨ A mix of moderate-intensity and vigorous-intensity exercise every week.  Children, pregnant women, people who are out of shape, people who are overweight, and older adults may need to consult a health care provider for individual recommendations. If you have any sort of medical condition, be sure to consult your health care provider before starting a new exercise program.  What are some exercise ideas?  Some moderate-intensity exercise ideas include:  · Walking at a rate of 1 mile in 15  minutes.  · Biking.  · Hiking.  · Golfing.  · Dancing.  Some vigorous-intensity exercise ideas include:  · Walking at a rate of at least 4.5 miles per hour.  · Jogging or running at a rate of 5 miles per hour.  · Biking at a rate of at least 10 miles per hour.  · Lap swimming.  · Roller-skating or in-line skating.  · Cross-country skiing.  · Vigorous competitive sports, such as football, basketball, and soccer.  · Jumping rope.  · Aerobic dancing.  What are some everyday activities that can help me to get exercise?  · Yard work, such as:  ¨ Pushing a .  ¨ Raking and bagging leaves.  · Washing and waxing your car.  · Pushing a stroller.  · Shoveling snow.  · Gardening.  · Washing windows or floors.  How can I be more active in my day-to-day activities?  · Use the stairs instead of the elevator.  · Take a walk during your lunch break.  · If you drive, park your car farther away from work or school.  · If you take public transportation, get off one stop early and walk the rest of the way.  · Make all of your phone calls while standing up and walking around.  · Get up, stretch, and walk around every 30 minutes throughout the day.  What guidelines should I follow while exercising?  · Do not exercise so much that you hurt yourself, feel dizzy, or get very short of breath.  · Consult your health care provider before starting a new exercise program.  · Wear comfortable clothes and shoes with good support.  · Drink plenty of water while you exercise to prevent dehydration or heat stroke. Body water is lost during exercise and must be replaced.  · Work out until you breathe faster and your heart beats faster.  This information is not intended to replace advice given to you by your health care provider. Make sure you discuss any questions you have with your health care provider.  Document Released: 01/20/2012 Document Revised: 05/25/2017 Document Reviewed: 05/21/2015  [employer] Interactive Patient Education © 2017  [employer]    Fall Prevention in the Home  Falls can cause injuries and can affect people from all age groups. There are many simple things that you can do to make your home safe and to help prevent falls.  What can I do on the outside of my home?  · Regularly repair the edges of walkways and driveways and fix any cracks.  · Remove high doorway thresholds.  · Trim any shrubbery on the main path into your home.  · Use bright outdoor lighting.  · Clear walkways of debris and clutter, including tools and rocks.  · Regularly check that handrails are securely fastened and in good repair. Both sides of any steps should have handrails.  · Install guardrails along the edges of any raised decks or porches.  · Have leaves, snow, and ice cleared regularly.  · Use sand or salt on walkways during winter months.  · In the garage, clean up any spills right away, including grease or oil spills.  What can I do in the bathroom?  · Use night lights.  · Install grab bars by the toilet and in the tub and shower. Do not use towel bars as grab bars.  · Use non-skid mats or decals on the floor of the tub or shower.  · If you need to sit down while you are in the shower, use a plastic, non-slip stool.  · Keep the floor dry. Immediately clean up any water that spills on the floor.  · Remove soap buildup in the tub or shower on a regular basis.  · Attach bath mats securely with double-sided non-slip rug tape.  · Remove throw rugs and other tripping hazards from the floor.  What can I do in the bedroom?  · Use night lights.  · Make sure that a bedside light is easy to reach.  · Do not use oversized bedding that drapes onto the floor.  · Have a firm chair that has side arms to use for getting dressed.  · Remove throw rugs and other tripping hazards from the floor.  What can I do in the kitchen?  · Clean up any spills right away.  · Avoid walking on wet floors.  · Place frequently used items in easy-to-reach places.  · If you need to reach  for something above you, use a sturdy step stool that has a grab bar.  · Keep electrical cables out of the way.  · Do not use floor polish or wax that makes floors slippery. If you have to use wax, make sure that it is non-skid floor wax.  · Remove throw rugs and other tripping hazards from the floor.  What can I do in the stairways?  · Do not leave any items on the stairs.  · Make sure that there are handrails on both sides of the stairs. Fix handrails that are broken or loose. Make sure that handrails are as long as the stairways.  · Check any carpeting to make sure that it is firmly attached to the stairs. Fix any carpet that is loose or worn.  · Avoid having throw rugs at the top or bottom of stairways, or secure the rugs with carpet tape to prevent them from moving.  · Make sure that you have a light switch at the top of the stairs and the bottom of the stairs. If you do not have them, have them installed.  What are some other fall prevention tips?  · Wear closed-toe shoes that fit well and support your feet. Wear shoes that have rubber soles or low heels.  · When you use a stepladder, make sure that it is completely opened and that the sides are firmly locked. Have someone hold the ladder while you are using it. Do not climb a closed stepladder.  · Add color or contrast paint or tape to grab bars and handrails in your home. Place contrasting color strips on the first and last steps.  · Use mobility aids as needed, such as canes, walkers, scooters, and crutches.  · Turn on lights if it is dark. Replace any light bulbs that burn out.  · Set up furniture so that there are clear paths. Keep the furniture in the same spot.  · Fix any uneven floor surfaces.  · Choose a carpet design that does not hide the edge of steps of a stairway.  · Be aware of any and all pets.  · Review your medicines with your healthcare provider. Some medicines can cause dizziness or changes in blood pressure, which increase your risk of  falling.  Talk with your health care provider about other ways that you can decrease your risk of falls. This may include working with a physical therapist or  to improve your strength, balance, and endurance.  This information is not intended to replace advice given to you by your health care provider. Make sure you discuss any questions you have with your health care provider.  Document Released: 2003 Document Revised: 2017 Document Reviewed: 2016  [employer] Interactive Patient Education © 2017 [employer]    Medicare Wellness  Personal Prevention Plan of Service     Date of Office Visit:  2018  Encounter Provider:  Juan Luis Zafar MD  Place of Service:  [facility]  Patient Name: Naina Manning  :  1950    As part of the Medicare Wellness portion of your visit today, we are providing you with this personalized preventive plan of services (PPPS). This plan is based upon recommendations of the [place] Preventive Services Task Force (USPSTF) and the Advisory Committee on Immunization Practices (ACIP).    This lists the preventive care services that should be considered, and provides dates of when you are due. Items listed as completed are up-to-date and do not require any further intervention.    Health Maintenance   Topic Date Due   • MAMMOGRAM  2016   • ZOSTER VACCINE  2016   • MEDICARE ANNUAL WELLNESS  2018   • LIPID PANEL  2018   • PNEUMOCOCCAL VACCINES (65+ LOW/MEDIUM RISK) (2 of 2 - PPSV23) 2018   • INFLUENZA VACCINE  2018   • TDAP/TD VACCINES (2 - Td) 2023   • COLONOSCOPY  10/04/2026   • HEPATITIS C SCREENING  Completed       Orders Placed This Encounter   Procedures   • TSH   • T4, free   • Comprehensive Metabolic Panel   • Lipid Panel   • Vitamin D 25 Hydroxy   • Occult Blood, Fecal By Immunoassay - Stool, Per Rectum     Standing Status:   Future     Standing Expiration Date:    4/17/2019   • POCT urinalysis dipstick, automated   • CBC & Differential     Order Specific Question:   Manual Differential     Answer:   No       Return in 6 months (on 10/17/2018).

## 2018-04-17 NOTE — PROGRESS NOTES
QUICK REFERENCE INFORMATION:  The ABCs of the Annual Wellness Visit    Subsequent Medicare Wellness Visit    HEALTH RISK ASSESSMENT    1950    Recent Hospitalizations:  No hospitalization(s) within the last year..        Current Medical Providers:  Patient Care Team:  Juan Luis Zafar MD as PCP - General (Family Medicine)  Emeka Carranza MD as Consulting Physician (Cardiology)  Krunal Garner OD (Optometry)  Marie Malone (Internal Medicine)        Smoking Status:  History   Smoking Status   • Never Smoker   Smokeless Tobacco   • Never Used       Alcohol Consumption:  History   Alcohol Use   • Yes     Comment: occ       Depression Screen:   PHQ-2/PHQ-9 Depression Screening 4/17/2018   Little interest or pleasure in doing things 0   Feeling down, depressed, or hopeless 0   Trouble falling or staying asleep, or sleeping too much -   Feeling tired or having little energy -   Poor appetite or overeating -   Feeling bad about yourself - or that you are a failure or have let yourself or your family down -   Trouble concentrating on things, such as reading the newspaper or watching television -   Moving or speaking so slowly that other people could have noticed. Or the opposite - being so fidgety or restless that you have been moving around a lot more than usual -   Thoughts that you would be better off dead, or of hurting yourself in some way -   Total Score 0   If you checked off any problems, how difficult have these problems made it for you to do your work, take care of things at home, or get along with other people? -       Health Habits and Functional and Cognitive Screening:  Functional & Cognitive Status 4/17/2018   Do you have difficulty preparing food and eating? No   Do you have difficulty bathing yourself, getting dressed or grooming yourself? No   Do you have difficulty using the toilet? No   Do you have difficulty moving around from place to place? No   Do you have trouble with steps or  "getting out of a bed or a chair? No   In the past year have you fallen or experienced a near fall? No   Current Diet Well Balanced Diet   Dental Exam Up to date   Eye Exam Up to date   Exercise (times per week) 5 times per week   Current Exercise Activities Include Swimming   Do you need help using the phone?  No   Are you deaf or do you have serious difficulty hearing?  No   Do you need help with transportation? No   Do you need help shopping? No   Do you need help preparing meals?  No   Do you need help with housework?  No   Do you need help with laundry? No   Do you need help taking your medications? No   Do you need help managing money? No   Do you ever drive or ride in a car without wearing a seat belt? No   Have you felt unusual stress, anger or loneliness in the last month? No   Who do you live with? Spouse   If you need help, do you have trouble finding someone available to you? No   Have you been bothered in the last four weeks by sexual problems? No   Do you have difficulty concentrating, remembering or making decisions? No     -- The Timed Up and Go (TUG) Test ([facility] Version)                  - Testing directions adhered to:                                  1) Patients wears regular footwear and may use walking aid(s) if    needed.                                  2) Patient to sit back in standard arm chair and identify a line 10 feet    away from patient on floor.                                  3) Test time begins at \"Go\" and stops when patient reseated in arm    chair.                    - Instructions read aloud (and demonstrated as applicable) to patient:                                      When I say \"Go,\" I want you to:                                    1) Stand up from the chair.                                  2) Walk to the line on the floor (10 feet away) at your normal pace.                                  3) Turn.                                  4) Walk back to the chair at your normal " pace.                                  5) Sit down again.                    - Result: 3                    - Observations during test:Normal gait            Does the patient have evidence of cognitive impairment? No    Aspirin use counseling: Taking ASA appropriately as indicated      Recent Lab Results:  CMP:  Lab Results   Component Value Date    GLU 89 10/11/2017    BUN 15 10/11/2017    CREATININE 0.68 10/11/2017    EGFRIFNONA 87 10/11/2017    EGFRIFAFRI 105 10/11/2017    BCR 22.1 10/11/2017     10/11/2017    K 5.0 10/11/2017    CO2 29.0 10/11/2017    CALCIUM 9.7 10/11/2017    PROTENTOTREF 7.6 04/03/2017    ALBUMIN 4.40 04/03/2017    LABGLOBREF 3.2 04/03/2017    LABIL2 1.4 04/03/2017    BILITOT 0.6 04/03/2017    ALKPHOS 71 04/03/2017    AST 22 04/03/2017    ALT 26 04/03/2017     Lipid Panel:  Lab Results   Component Value Date    TRIG 93 04/03/2017    HDL 44 (L) 04/03/2017    VLDL 18.6 04/03/2017     HbA1c:  Lab Results   Component Value Date    HGBA1C 5.40 02/13/2017       Visual Acuity:  Right eye 20/20 and left eye 20/20 w/contact.    Age-appropriate Screening Schedule:  Refer to the list below for future screening recommendations based on patient's age, sex and/or medical conditions. Orders for these recommended tests are listed in the plan section. The patient has been provided with a written plan.    Health Maintenance   Topic Date Due   • MAMMOGRAM  11/07/2016   • ZOSTER VACCINE  11/07/2016   • PNEUMOCOCCAL VACCINES (65+ LOW/MEDIUM RISK) (2 of 2 - PPSV23) 07/16/2018   • INFLUENZA VACCINE  08/01/2018   • LIPID PANEL  04/17/2019   • TDAP/TD VACCINES (2 - Td) 07/16/2023   • COLONOSCOPY  10/04/2026        Subjective   History of Present Illness    Naina Manning is a 68 y.o. female who presents for an Subsequent Wellness Visit.    The following portions of the patient's history were reviewed and updated as appropriate: allergies, current medications, past family history, past medical history, past social  history, past surgical history and problem list.    Outpatient Medications Prior to Visit   Medication Sig Dispense Refill   • amLODIPine (NORVASC) 10 MG tablet Take 1 tablet by mouth Daily. 90 tablet 2   • aspirin 81 MG EC tablet Take 81 mg by mouth Daily.     • cholecalciferol (VITAMIN D3) 1000 units tablet Take 1,000 Units by mouth Daily.     • CloNIDine (CATAPRES) 0.1 MG tablet Take 0.1 mg by mouth Every Night.     • CloNIDine (CATAPRES) 0.2 MG tablet Take 0.5 tablets by mouth Every Morning. 90 tablet 2   • fluticasone (FLONASE) 50 MCG/ACT nasal spray 2 sprays into each nostril Daily. 16 g 3   • hydrOXYzine (ATARAX) 25 MG tablet One or 2 every 4-6 hours as needed for anxiety and nausea 60 tablet 2   • indapamide (LOZOL) 2.5 MG tablet Take 2.5 mg by mouth Daily.     • levothyroxine (SYNTHROID, LEVOTHROID) 50 MCG tablet Take 1 tablet by mouth Daily. 90 tablet 2   • losartan (COZAAR) 100 MG tablet Take 1 tablet by mouth Every Morning. 90 tablet 2   • Multiple Vitamins-Minerals (MULTIVITAMIN ADULTS 50+ PO) Take  by mouth.     • omeprazole (priLOSEC) 40 MG capsule Take 1 capsule by mouth Daily. 90 capsule 2   • pravastatin (PRAVACHOL) 40 MG tablet Take 1 tablet by mouth Every Night. 90 tablet 2   • PROVENTIL  (90 Base) MCG/ACT inhaler INHALE TWO PUFFS BY MOUTH EVERY 4 HOURS AS NEEDED FOR WHEEZING 18 g 2     No facility-administered medications prior to visit.        Patient Active Problem List   Diagnosis   • Body mass index (BMI) of 25.0 to 29.9   • Essential hypertension   • Hypertension   • Hypothyroid   • Hypothyroidism   • Seasonal allergic rhinitis       Advance Care Planning:  has NO advance directive - information provided to the patient today    Identification of Risk Factors:  Risk factors include: weight , unhealthy diet, cardiovascular risk, inactivity and increased fall risk.    Review of Systems   Respiratory: Negative for cough and shortness of breath.    Cardiovascular: Negative for chest pain  "and leg swelling.   All other systems reviewed and are negative.      Compared to one year ago, the patient feels her physical health is better.  Compared to one year ago, the patient feels her mental health is better.    Objective     Physical Exam   Constitutional: She is oriented to person, place, and time. She appears well-developed and well-nourished.   HENT:   Right Ear: External ear normal.   Left Ear: External ear normal.   Mouth/Throat: Oropharynx is clear and moist.   Eyes: EOM are normal. Pupils are equal, round, and reactive to light.   Neck: No thyromegaly present.   Good carotid pulses   Cardiovascular: Normal rate and regular rhythm.    Pulmonary/Chest: Effort normal and breath sounds normal.   Breasts no masses noted   Abdominal: Soft. Bowel sounds are normal. She exhibits no mass.   Genitourinary:   Genitourinary Comments: Cervix present, Patzer taken, uterus normal size and no adnexal masses   Musculoskeletal: She exhibits no edema.   Lymphadenopathy:     She has no cervical adenopathy.   Neurological: She is alert and oriented to person, place, and time.   Skin: Skin is warm and dry. Capillary refill takes less than 2 seconds. She is not diaphoretic.   Psychiatric: She has a normal mood and affect. Her behavior is normal. Judgment and thought content normal.   Nursing note and vitals reviewed.      Vitals:    04/17/18 0835   BP: 120/74   BP Location: Left arm   Patient Position: Sitting   Cuff Size: Adult   Pulse: 73   Temp: 98 °F (36.7 °C)   TempSrc: Oral   SpO2: 98%   Weight: 75.3 kg (166 lb)   Height: 167.6 cm (65.98\")   PainSc: 0-No pain       Patient's Body mass index is 26.81 kg/m². BMI is within normal parameters. No follow-up required.      Assessment/Plan   Patient Self-Management and Personalized Health Advice  The patient has been provided with information about: diet, exercise, weight management, prevention of cardiac or vascular disease and fall prevention and preventive services " including:   · Advance directive, Bone densitometry screening, Colorectal cancer screening, fecal occult blood test, Fall Risk plan of care done, Screening mammography, referral placed.    Visit Diagnoses:    ICD-10-CM ICD-9-CM   1. Fatigue, unspecified type R53.83 780.79   2. Mixed hyperlipidemia E78.2 272.2   3. Vitamin D deficiency E55.9 268.9   4. Annual physical exam Z00.00 V70.0   5. Screening for malignant neoplasm of the rectum Z12.12 V76.41       Orders Placed This Encounter   Procedures   • TSH   • T4, free   • Comprehensive Metabolic Panel   • Lipid Panel   • Vitamin D 25 Hydroxy   • Occult Blood, Fecal By Immunoassay - Stool, Per Rectum     Standing Status:   Future     Standing Expiration Date:   4/17/2019   • POCT urinalysis dipstick, automated   • CBC & Differential     Order Specific Question:   Manual Differential     Answer:   No       Outpatient Encounter Prescriptions as of 4/17/2018   Medication Sig Dispense Refill   • amLODIPine (NORVASC) 10 MG tablet Take 1 tablet by mouth Daily. 90 tablet 2   • aspirin 81 MG EC tablet Take 81 mg by mouth Daily.     • cholecalciferol (VITAMIN D3) 1000 units tablet Take 1,000 Units by mouth Daily.     • CloNIDine (CATAPRES) 0.1 MG tablet Take 0.1 mg by mouth Every Night.     • CloNIDine (CATAPRES) 0.2 MG tablet Take 0.5 tablets by mouth Every Morning. 90 tablet 2   • fluticasone (FLONASE) 50 MCG/ACT nasal spray 2 sprays into each nostril Daily. 16 g 3   • hydrOXYzine (ATARAX) 25 MG tablet One or 2 every 4-6 hours as needed for anxiety and nausea 60 tablet 2   • indapamide (LOZOL) 2.5 MG tablet Take 2.5 mg by mouth Daily.     • levothyroxine (SYNTHROID, LEVOTHROID) 50 MCG tablet Take 1 tablet by mouth Daily. 90 tablet 2   • losartan (COZAAR) 100 MG tablet Take 1 tablet by mouth Every Morning. 90 tablet 2   • Multiple Vitamins-Minerals (MULTIVITAMIN ADULTS 50+ PO) Take  by mouth.     • omeprazole (priLOSEC) 40 MG capsule Take 1 capsule by mouth Daily. 90 capsule  2   • pravastatin (PRAVACHOL) 40 MG tablet Take 1 tablet by mouth Every Night. 90 tablet 2   • PROVENTIL  (90 Base) MCG/ACT inhaler INHALE TWO PUFFS BY MOUTH EVERY 4 HOURS AS NEEDED FOR WHEEZING 18 g 2     No facility-administered encounter medications on file as of 4/17/2018.        Reviewed use of high risk medication in the elderly: yes  Reviewed for potential of harmful drug interactions in the elderly: yes    Follow Up:  Return in 6 months (on 10/17/2018).     An After Visit Summary and PPPS with all of these plans were given to the patient.       plan-above-finishing up at [facility]

## 2018-04-18 DIAGNOSIS — Z12.12 SCREENING FOR MALIGNANT NEOPLASM OF THE RECTUM: ICD-10-CM

## 2018-04-18 LAB
25(OH)D3+25(OH)D2 SERPL-MCNC: 39.9 NG/ML (ref 30–100)
ALBUMIN SERPL-MCNC: 4.7 G/DL (ref 3.5–5)
ALBUMIN/GLOB SERPL: 1.4 G/DL (ref 1.1–2.5)
ALP SERPL-CCNC: 90 U/L (ref 24–120)
ALT SERPL-CCNC: 45 U/L (ref 0–54)
AST SERPL-CCNC: 39 U/L (ref 7–45)
BASOPHILS # BLD AUTO: 0.05 10*3/MM3 (ref 0–0.2)
BASOPHILS NFR BLD AUTO: 0.7 % (ref 0–2)
BILIRUB SERPL-MCNC: 0.5 MG/DL (ref 0.1–1)
BUN SERPL-MCNC: 18 MG/DL (ref 5–21)
BUN/CREAT SERPL: 25.4 (ref 7–25)
CALCIUM SERPL-MCNC: 10.4 MG/DL (ref 8.4–10.4)
CHLORIDE SERPL-SCNC: 94 MMOL/L (ref 98–110)
CHOLEST SERPL-MCNC: 168 MG/DL (ref 130–200)
CO2 SERPL-SCNC: 30 MMOL/L (ref 24–31)
CREAT SERPL-MCNC: 0.71 MG/DL (ref 0.5–1.4)
CYTOLOGIST CVX/VAG CYTO: NORMAL
CYTOLOGY CVX/VAG DOC THIN PREP: NORMAL
DX ICD CODE: NORMAL
EOSINOPHIL # BLD AUTO: 0.13 10*3/MM3 (ref 0–0.7)
EOSINOPHIL NFR BLD AUTO: 1.9 % (ref 0–4)
ERYTHROCYTE [DISTWIDTH] IN BLOOD BY AUTOMATED COUNT: 13.1 % (ref 12–15)
GFR SERPLBLD CREATININE-BSD FMLA CKD-EPI: 100 ML/MIN/1.73
GFR SERPLBLD CREATININE-BSD FMLA CKD-EPI: 82 ML/MIN/1.73
GLOBULIN SER CALC-MCNC: 3.4 GM/DL
GLUCOSE SERPL-MCNC: 104 MG/DL (ref 70–100)
HCT VFR BLD AUTO: 44.6 % (ref 37–47)
HDLC SERPL-MCNC: 49 MG/DL
HGB BLD-MCNC: 14.2 G/DL (ref 12–16)
HIV 1 & 2 AB SER-IMP: NORMAL
IMM GRANULOCYTES # BLD: 0.02 10*3/MM3 (ref 0–0.03)
IMM GRANULOCYTES NFR BLD: 0.3 % (ref 0–5)
LDLC SERPL CALC-MCNC: 94 MG/DL (ref 0–99)
LYMPHOCYTES # BLD AUTO: 1.91 10*3/MM3 (ref 0.72–4.86)
LYMPHOCYTES NFR BLD AUTO: 28.2 % (ref 15–45)
MCH RBC QN AUTO: 28.6 PG (ref 28–32)
MCHC RBC AUTO-ENTMCNC: 31.8 G/DL (ref 33–36)
MCV RBC AUTO: 89.7 FL (ref 82–98)
MONOCYTES # BLD AUTO: 0.55 10*3/MM3 (ref 0.19–1.3)
MONOCYTES NFR BLD AUTO: 8.1 % (ref 4–12)
NEUTROPHILS # BLD AUTO: 4.11 10*3/MM3 (ref 1.87–8.4)
NEUTROPHILS NFR BLD AUTO: 60.8 % (ref 39–78)
NRBC BLD AUTO-RTO: 0 /100 WBC (ref 0–0)
OTHER STN SPEC: NORMAL
PATH REPORT.FINAL DX SPEC: NORMAL
PLATELET # BLD AUTO: 445 10*3/MM3 (ref 130–400)
POTASSIUM SERPL-SCNC: 5.7 MMOL/L (ref 3.5–5.3)
PROT SERPL-MCNC: 8.1 G/DL (ref 6.3–8.7)
RBC # BLD AUTO: 4.97 10*6/MM3 (ref 4.2–5.4)
SODIUM SERPL-SCNC: 138 MMOL/L (ref 135–145)
STAT OF ADQ CVX/VAG CYTO-IMP: NORMAL
T4 FREE SERPL-MCNC: 1.18 NG/DL (ref 0.78–2.19)
TRIGL SERPL-MCNC: 124 MG/DL (ref 0–149)
TSH SERPL DL<=0.005 MIU/L-ACNC: 2.01 MIU/ML (ref 0.47–4.68)
VLDLC SERPL CALC-MCNC: 24.8 MG/DL
WBC # BLD AUTO: 6.77 10*3/MM3 (ref 4.8–10.8)

## 2018-04-19 DIAGNOSIS — E87.5 HYPERKALEMIA: Primary | ICD-10-CM

## 2018-04-19 LAB — HEMOCCULT STL QL IA: NEGATIVE

## 2018-04-24 DIAGNOSIS — Z12.31 ENCOUNTER FOR SCREENING MAMMOGRAM FOR MALIGNANT NEOPLASM OF BREAST: ICD-10-CM

## 2018-04-24 DIAGNOSIS — N95.1 SYMPTOMATIC MENOPAUSAL OR FEMALE CLIMACTERIC STATES: ICD-10-CM

## 2018-05-03 ENCOUNTER — RESULTS ENCOUNTER (OUTPATIENT)
Dept: FAMILY MEDICINE CLINIC | Facility: CLINIC | Age: 68
End: 2018-05-03

## 2018-05-03 DIAGNOSIS — E87.5 HYPERKALEMIA: ICD-10-CM

## 2018-05-03 LAB
BUN SERPL-MCNC: 18 MG/DL (ref 5–21)
BUN/CREAT SERPL: 25.4 (ref 7–25)
CALCIUM SERPL-MCNC: 10.4 MG/DL (ref 8.4–10.4)
CHLORIDE SERPL-SCNC: 98 MMOL/L (ref 98–110)
CO2 SERPL-SCNC: 27 MMOL/L (ref 24–31)
CREAT SERPL-MCNC: 0.71 MG/DL (ref 0.5–1.4)
GFR SERPLBLD CREATININE-BSD FMLA CKD-EPI: 100 ML/MIN/1.73
GFR SERPLBLD CREATININE-BSD FMLA CKD-EPI: 82 ML/MIN/1.73
GLUCOSE SERPL-MCNC: 94 MG/DL (ref 70–100)
POTASSIUM SERPL-SCNC: 4.6 MMOL/L (ref 3.5–5.3)
SODIUM SERPL-SCNC: 142 MMOL/L (ref 135–145)

## 2018-05-04 RX ORDER — FLUTICASONE PROPIONATE 50 MCG
1 SPRAY, SUSPENSION (ML) NASAL 2 TIMES DAILY
Qty: 16 G | Refills: 3 | Status: SHIPPED | OUTPATIENT
Start: 2018-05-04 | End: 2018-10-02 | Stop reason: SDUPTHER

## 2018-05-04 RX ORDER — CLONIDINE HYDROCHLORIDE 0.2 MG/1
0.1 TABLET ORAL EVERY MORNING
Qty: 90 TABLET | Refills: 3 | Status: SHIPPED | OUTPATIENT
Start: 2018-05-04 | End: 2018-05-10 | Stop reason: SDUPTHER

## 2018-05-04 RX ORDER — OMEPRAZOLE 40 MG/1
40 CAPSULE, DELAYED RELEASE ORAL DAILY
Qty: 90 CAPSULE | Refills: 3 | Status: SHIPPED | OUTPATIENT
Start: 2018-05-04 | End: 2019-04-08 | Stop reason: SDUPTHER

## 2018-05-04 RX ORDER — CLONIDINE HYDROCHLORIDE 0.1 MG/1
0.1 TABLET ORAL NIGHTLY
Qty: 90 TABLET | Refills: 3 | Status: SHIPPED | OUTPATIENT
Start: 2018-05-04 | End: 2019-04-08 | Stop reason: SDUPTHER

## 2018-05-04 NOTE — TELEPHONE ENCOUNTER
MED REFILL REQUESTS 90 DAYS      CLONIDINE 0.2MG (1) QAM   CLONIDINE 0.1MG (1) QPM  OMEPRAZOLE 40MG (1) QD  FLONASE NASAL SPRAY (1) SPRAY BID     [facility]

## 2018-05-10 RX ORDER — CLONIDINE HYDROCHLORIDE 0.2 MG/1
0.2 TABLET ORAL EVERY MORNING
Qty: 90 TABLET | Refills: 3 | Status: SHIPPED | OUTPATIENT
Start: 2018-05-10 | End: 2019-06-03 | Stop reason: SDUPTHER

## 2018-05-18 ENCOUNTER — TELEPHONE (OUTPATIENT)
Dept: FAMILY MEDICINE CLINIC | Facility: CLINIC | Age: 68
End: 2018-05-18

## 2018-05-18 DIAGNOSIS — M60.9 MYOSITIS OF MULTIPLE SITES, UNSPECIFIED MYOSITIS TYPE: ICD-10-CM

## 2018-05-18 DIAGNOSIS — I10 BENIGN HYPERTENSION: Primary | ICD-10-CM

## 2018-05-18 DIAGNOSIS — I10 BENIGN HYPERTENSION: ICD-10-CM

## 2018-05-18 LAB — CK SERPL-CCNC: 58 U/L (ref 0–203)

## 2018-05-18 NOTE — TELEPHONE ENCOUNTER
Patient is having muscle cramps.  Did not know if there is a test you could do to determine what is causing them         come over here and get a CMP, magnesium, and CPK

## 2018-05-19 LAB
ALBUMIN SERPL-MCNC: 4.4 G/DL (ref 3.5–5)
ALBUMIN/GLOB SERPL: 1.4 G/DL (ref 1.1–2.5)
ALP SERPL-CCNC: 75 U/L (ref 24–120)
ALT SERPL-CCNC: 34 U/L (ref 0–54)
AST SERPL-CCNC: 29 U/L (ref 7–45)
BILIRUB SERPL-MCNC: 0.4 MG/DL (ref 0.1–1)
BUN SERPL-MCNC: 17 MG/DL (ref 5–21)
BUN/CREAT SERPL: 22.7 (ref 7–25)
CALCIUM SERPL-MCNC: 9.6 MG/DL (ref 8.4–10.4)
CHLORIDE SERPL-SCNC: 96 MMOL/L (ref 98–110)
CO2 SERPL-SCNC: 30 MMOL/L (ref 24–31)
CREAT SERPL-MCNC: 0.75 MG/DL (ref 0.5–1.4)
GFR SERPLBLD CREATININE-BSD FMLA CKD-EPI: 77 ML/MIN/1.73
GFR SERPLBLD CREATININE-BSD FMLA CKD-EPI: 94 ML/MIN/1.73
GLOBULIN SER CALC-MCNC: 3.1 GM/DL
GLUCOSE SERPL-MCNC: 107 MG/DL (ref 70–100)
MAGNESIUM SERPL-MCNC: 1.6 MG/DL (ref 1.4–2.2)
POTASSIUM SERPL-SCNC: 4.3 MMOL/L (ref 3.5–5.3)
PROT SERPL-MCNC: 7.5 G/DL (ref 6.3–8.7)
SODIUM SERPL-SCNC: 139 MMOL/L (ref 135–145)

## 2018-05-21 RX ORDER — CARBIDOPA AND LEVODOPA 25; 100 MG/1; MG/1
2 TABLET, EXTENDED RELEASE ORAL NIGHTLY
Qty: 60 TABLET | Refills: 2 | Status: SHIPPED | OUTPATIENT
Start: 2018-05-21 | End: 2018-10-17

## 2018-08-13 RX ORDER — PRAVASTATIN SODIUM 40 MG
40 TABLET ORAL NIGHTLY
Qty: 90 TABLET | Refills: 0 | Status: SHIPPED | OUTPATIENT
Start: 2018-08-13 | End: 2018-11-23 | Stop reason: SDUPTHER

## 2018-08-13 RX ORDER — LEVOTHYROXINE SODIUM 0.05 MG/1
50 TABLET ORAL DAILY
Qty: 90 TABLET | Refills: 0 | Status: SHIPPED | OUTPATIENT
Start: 2018-08-13 | End: 2019-01-09 | Stop reason: SDUPTHER

## 2018-08-13 NOTE — TELEPHONE ENCOUNTER
MED REFILL CPE 04.03.18    LEVOTHYROXINE 50MCG (1) QAM  PRAVASTATIN 40MG (1) QHS      [facility]      6 MOS CHECKUP SCHEDULED 10.17.18

## 2018-08-15 RX ORDER — LOSARTAN POTASSIUM 100 MG/1
TABLET ORAL
Qty: 90 TABLET | Refills: 2 | Status: SHIPPED | OUTPATIENT
Start: 2018-08-15 | End: 2019-04-08 | Stop reason: SDUPTHER

## 2018-08-15 RX ORDER — AMLODIPINE BESYLATE 10 MG/1
TABLET ORAL
Qty: 90 TABLET | Refills: 2 | Status: SHIPPED | OUTPATIENT
Start: 2018-08-15 | End: 2019-04-08 | Stop reason: SDUPTHER

## 2018-08-15 RX ORDER — INDAPAMIDE 2.5 MG/1
TABLET, FILM COATED ORAL
Qty: 90 TABLET | Refills: 2 | Status: SHIPPED | OUTPATIENT
Start: 2018-08-15 | End: 2018-10-17 | Stop reason: SDUPTHER

## 2018-10-02 RX ORDER — FLUTICASONE PROPIONATE 50 MCG
SPRAY, SUSPENSION (ML) NASAL
Qty: 16 G | Refills: 3 | Status: SHIPPED | OUTPATIENT
Start: 2018-10-02 | End: 2019-01-09 | Stop reason: SDUPTHER

## 2018-10-17 ENCOUNTER — OFFICE VISIT (OUTPATIENT)
Dept: FAMILY MEDICINE CLINIC | Facility: CLINIC | Age: 68
End: 2018-10-17

## 2018-10-17 VITALS
DIASTOLIC BLOOD PRESSURE: 78 MMHG | BODY MASS INDEX: 27.32 KG/M2 | HEART RATE: 83 BPM | OXYGEN SATURATION: 98 % | TEMPERATURE: 99.3 F | SYSTOLIC BLOOD PRESSURE: 123 MMHG | HEIGHT: 66 IN | WEIGHT: 170 LBS

## 2018-10-17 DIAGNOSIS — E78.2 MIXED HYPERLIPIDEMIA: ICD-10-CM

## 2018-10-17 DIAGNOSIS — Z23 NEED FOR IMMUNIZATION AGAINST INFLUENZA: Primary | ICD-10-CM

## 2018-10-17 DIAGNOSIS — Z23 NEED FOR SHINGLES VACCINE: ICD-10-CM

## 2018-10-17 LAB
ALBUMIN SERPL-MCNC: 4.9 G/DL (ref 3.5–5)
ALBUMIN/GLOB SERPL: 1.5 G/DL (ref 1.1–2.5)
ALP SERPL-CCNC: 95 U/L (ref 24–120)
ALT SERPL-CCNC: 35 U/L (ref 0–54)
AST SERPL-CCNC: 27 U/L (ref 7–45)
BILIRUB SERPL-MCNC: 0.6 MG/DL (ref 0.1–1)
BUN SERPL-MCNC: 14 MG/DL (ref 5–21)
BUN/CREAT SERPL: 20.9 (ref 7–25)
CALCIUM SERPL-MCNC: 10 MG/DL (ref 8.4–10.4)
CHLORIDE SERPL-SCNC: 95 MMOL/L (ref 98–110)
CHOLEST SERPL-MCNC: 180 MG/DL (ref 130–200)
CO2 SERPL-SCNC: 29 MMOL/L (ref 24–31)
CREAT SERPL-MCNC: 0.67 MG/DL (ref 0.5–1.4)
GLOBULIN SER CALC-MCNC: 3.2 GM/DL
GLUCOSE SERPL-MCNC: 106 MG/DL (ref 70–100)
HDLC SERPL-MCNC: 41 MG/DL
LDLC SERPL CALC-MCNC: 113 MG/DL (ref 0–99)
POTASSIUM SERPL-SCNC: 4.5 MMOL/L (ref 3.5–5.3)
PROT SERPL-MCNC: 8.1 G/DL (ref 6.3–8.7)
SODIUM SERPL-SCNC: 138 MMOL/L (ref 135–145)
TRIGL SERPL-MCNC: 128 MG/DL (ref 0–149)
VLDLC SERPL CALC-MCNC: 25.6 MG/DL

## 2018-10-17 PROCEDURE — 90662 IIV NO PRSV INCREASED AG IM: CPT | Performed by: FAMILY MEDICINE

## 2018-10-17 PROCEDURE — 99213 OFFICE O/P EST LOW 20 MIN: CPT | Performed by: FAMILY MEDICINE

## 2018-10-17 PROCEDURE — G0008 ADMIN INFLUENZA VIRUS VAC: HCPCS | Performed by: FAMILY MEDICINE

## 2018-10-17 RX ORDER — ESTRADIOL 0.1 MG/G
2 CREAM VAGINAL DAILY
Qty: 42.5 G | Refills: 12 | Status: SHIPPED | OUTPATIENT
Start: 2018-10-17 | End: 2018-10-19 | Stop reason: SDUPTHER

## 2018-10-19 RX ORDER — ESTRADIOL 0.1 MG/G
2 CREAM VAGINAL DAILY
Qty: 42.5 G | Refills: 12 | Status: SHIPPED | OUTPATIENT
Start: 2018-10-19 | End: 2019-12-26

## 2018-11-26 RX ORDER — PRAVASTATIN SODIUM 40 MG
TABLET ORAL
Qty: 90 TABLET | Refills: 1 | Status: SHIPPED | OUTPATIENT
Start: 2018-11-26 | End: 2019-06-03 | Stop reason: SDUPTHER

## 2019-01-09 ENCOUNTER — OFFICE VISIT (OUTPATIENT)
Dept: FAMILY MEDICINE CLINIC | Facility: CLINIC | Age: 69
End: 2019-01-09

## 2019-01-09 VITALS
WEIGHT: 170 LBS | HEART RATE: 84 BPM | TEMPERATURE: 99.8 F | SYSTOLIC BLOOD PRESSURE: 131 MMHG | HEIGHT: 66 IN | DIASTOLIC BLOOD PRESSURE: 91 MMHG | OXYGEN SATURATION: 98 % | BODY MASS INDEX: 27.32 KG/M2

## 2019-01-09 DIAGNOSIS — N93.9 VAGINAL BLEEDING: ICD-10-CM

## 2019-01-09 DIAGNOSIS — J01.00 ACUTE NON-RECURRENT MAXILLARY SINUSITIS: ICD-10-CM

## 2019-01-09 DIAGNOSIS — R10.84 GENERALIZED ABDOMINAL PAIN: Primary | ICD-10-CM

## 2019-01-09 LAB
ALBUMIN SERPL-MCNC: 4.2 G/DL (ref 3.5–5)
ALBUMIN/GLOB SERPL: 1.3 G/DL (ref 1.1–2.5)
ALP SERPL-CCNC: 71 U/L (ref 24–120)
ALT SERPL-CCNC: 46 U/L (ref 0–54)
AST SERPL-CCNC: 39 U/L (ref 7–45)
BASOPHILS # BLD AUTO: 0.06 10*3/MM3 (ref 0–0.2)
BASOPHILS NFR BLD AUTO: 1.4 % (ref 0–2)
BILIRUB SERPL-MCNC: 0.3 MG/DL (ref 0.1–1)
BUN SERPL-MCNC: 14 MG/DL (ref 5–21)
BUN/CREAT SERPL: 24.1 (ref 7–25)
CALCIUM SERPL-MCNC: 9.7 MG/DL (ref 8.4–10.4)
CHLORIDE SERPL-SCNC: 91 MMOL/L (ref 98–110)
CO2 SERPL-SCNC: 29 MMOL/L (ref 24–31)
CREAT SERPL-MCNC: 0.58 MG/DL (ref 0.5–1.4)
EOSINOPHIL # BLD AUTO: 0.07 10*3/MM3 (ref 0–0.7)
EOSINOPHIL NFR BLD AUTO: 1.6 % (ref 0–4)
ERYTHROCYTE [DISTWIDTH] IN BLOOD BY AUTOMATED COUNT: 13.2 % (ref 12–15)
EXPIRATION DATE: NORMAL
FLUAV AG NPH QL: NEGATIVE
FLUBV AG NPH QL: NEGATIVE
GLOBULIN SER CALC-MCNC: 3.2 GM/DL
GLUCOSE SERPL-MCNC: 105 MG/DL (ref 70–100)
HCT VFR BLD AUTO: 44.1 % (ref 37–47)
HGB BLD-MCNC: 14.7 G/DL (ref 12–16)
IMM GRANULOCYTES # BLD AUTO: 0.01 10*3/MM3 (ref 0–0.03)
IMM GRANULOCYTES NFR BLD AUTO: 0.2 % (ref 0–5)
INTERNAL CONTROL: NORMAL
LYMPHOCYTES # BLD AUTO: 1.66 10*3/MM3 (ref 0.72–4.86)
LYMPHOCYTES NFR BLD AUTO: 37.7 % (ref 15–45)
Lab: NORMAL
MCH RBC QN AUTO: 29 PG (ref 28–32)
MCHC RBC AUTO-ENTMCNC: 33.3 G/DL (ref 33–36)
MCV RBC AUTO: 87 FL (ref 82–98)
MONOCYTES # BLD AUTO: 0.4 10*3/MM3 (ref 0.19–1.3)
MONOCYTES NFR BLD AUTO: 9.1 % (ref 4–12)
NEUTROPHILS # BLD AUTO: 2.2 10*3/MM3 (ref 1.87–8.4)
NEUTROPHILS NFR BLD AUTO: 50 % (ref 39–78)
NRBC BLD AUTO-RTO: 0.5 /100 WBC (ref 0–0)
PLATELET # BLD AUTO: 391 10*3/MM3 (ref 130–400)
POTASSIUM SERPL-SCNC: 3.7 MMOL/L (ref 3.5–5.3)
PROT SERPL-MCNC: 7.4 G/DL (ref 6.3–8.7)
RBC # BLD AUTO: 5.07 10*6/MM3 (ref 4.2–5.4)
SODIUM SERPL-SCNC: 134 MMOL/L (ref 135–145)
WBC # BLD AUTO: 4.4 10*3/MM3 (ref 4.8–10.8)

## 2019-01-09 PROCEDURE — 96372 THER/PROPH/DIAG INJ SC/IM: CPT | Performed by: FAMILY MEDICINE

## 2019-01-09 PROCEDURE — 87804 INFLUENZA ASSAY W/OPTIC: CPT | Performed by: FAMILY MEDICINE

## 2019-01-09 PROCEDURE — 99214 OFFICE O/P EST MOD 30 MIN: CPT | Performed by: FAMILY MEDICINE

## 2019-01-09 RX ORDER — CEFDINIR 300 MG/1
300 CAPSULE ORAL 2 TIMES DAILY
Qty: 14 CAPSULE | Refills: 0 | Status: SHIPPED | OUTPATIENT
Start: 2019-01-09 | End: 2019-01-18

## 2019-01-09 RX ORDER — FLUTICASONE PROPIONATE 50 MCG
SPRAY, SUSPENSION (ML) NASAL
Qty: 16 G | Refills: 3 | Status: SHIPPED | OUTPATIENT
Start: 2019-01-09 | End: 2019-06-03 | Stop reason: SDUPTHER

## 2019-01-09 RX ORDER — LEVOTHYROXINE SODIUM 0.05 MG/1
TABLET ORAL
Qty: 90 TABLET | Refills: 0 | Status: SHIPPED | OUTPATIENT
Start: 2019-01-09 | End: 2019-03-19 | Stop reason: SDUPTHER

## 2019-01-09 RX ORDER — CEFTRIAXONE SODIUM 250 MG/1
500 INJECTION, POWDER, FOR SOLUTION INTRAMUSCULAR; INTRAVENOUS ONCE
Status: COMPLETED | OUTPATIENT
Start: 2019-01-09 | End: 2019-01-09

## 2019-01-09 RX ADMIN — CEFTRIAXONE SODIUM 500 MG: 250 INJECTION, POWDER, FOR SOLUTION INTRAMUSCULAR; INTRAVENOUS at 13:08

## 2019-01-09 NOTE — PROGRESS NOTES
Subjective   Naina Manning is a 68 y.o. female.     History of Present Illness     {Common H&P Review Areas:14628}    Review of Systems    Objective   Physical Exam    Assessment/Plan   {Assess/PlanSmartLinks:86833}

## 2019-01-09 NOTE — PROGRESS NOTES
Subjective   Naina Manning is a 68 y.o. female.      female with a four-day history of vomiting diarrhea coughing fever and vaginal bleeding-has history of severe allergies hypertension      Sinusitis   This is a new problem. The current episode started in the past 7 days. The problem has been waxing and waning since onset. The maximum temperature recorded prior to her arrival was 101 - 101.9 F. The fever has been present for 1 to 2 days. Associated symptoms include congestion, coughing, a hoarse voice, sinus pressure and sneezing. Past treatments include nothing. The treatment provided mild relief.       The following portions of the patient's history were reviewed and updated as appropriate: allergies, current medications, past family history, past medical history, past social history, past surgical history and problem list.    Review of Systems   HENT: Positive for congestion, hoarse voice, sinus pressure and sneezing.    Respiratory: Positive for cough.    Genitourinary:        Vaginal bleeding       Objective   Physical Exam   Constitutional: She is oriented to person, place, and time. She appears well-developed and well-nourished.   HENT:   Right Ear: External ear normal.   Left Ear: External ear normal.   Mouth/Throat: Oropharyngeal exudate present.   Cardiovascular: Normal rate and regular rhythm.   Pulmonary/Chest: Effort normal. She has wheezes.   Abdominal: Soft.   Lymphadenopathy:     She has no cervical adenopathy.   Neurological: She is alert and oriented to person, place, and time.   Skin: Skin is warm and dry.   Psychiatric: She has a normal mood and affect. Her behavior is normal. Judgment and thought content normal.   Nursing note and vitals reviewed.      Assessment/Plan   Patient Active Problem List   Diagnosis   • Body mass index (BMI) of 25.0 to 29.9   • Essential hypertension   • Hypertension   • Hypothyroid   • Hypothyroidism   • Seasonal allergic rhinitis     Naina was seen today  for headache, vomiting, cough, vaginal bleeding and diarrhea.    Diagnoses and all orders for this visit:    Generalized abdominal pain  -     CBC & Differential  -     Comprehensive Metabolic Panel    Acute non-recurrent maxillary sinusitis  -     cefTRIAXone (ROCEPHIN) injection 500 mg; Inject 500 mg into the appropriate muscle as directed by prescriber 1 (One) Time.    Vaginal bleeding  -     US Non-ob Transvaginal; Future    Other orders  -     cefdinir (OMNICEF) 300 MG capsule; Take 1 capsule by mouth 2 (Two) Times a Day.       Return if symptoms worsen or fail to improve, for Next scheduled follow up.       flu test negative-prednisone tapering dose antibiotics -2 drugstore

## 2019-01-10 RX ORDER — ALBUTEROL SULFATE 90 UG/1
2 AEROSOL, METERED RESPIRATORY (INHALATION) EVERY 4 HOURS PRN
Qty: 1 INHALER | Refills: 6 | Status: SHIPPED | OUTPATIENT
Start: 2019-01-10 | End: 2022-06-14

## 2019-01-10 RX ORDER — ALBUTEROL SULFATE 90 UG/1
2 AEROSOL, METERED RESPIRATORY (INHALATION) EVERY 4 HOURS PRN
Qty: 1 INHALER | Refills: 11 | Status: SHIPPED | OUTPATIENT
Start: 2019-01-10 | End: 2019-01-10 | Stop reason: CLARIF

## 2019-01-11 DIAGNOSIS — N95.0 POSTMENOPAUSAL BLEEDING: Primary | ICD-10-CM

## 2019-01-18 ENCOUNTER — OFFICE VISIT (OUTPATIENT)
Dept: OBSTETRICS AND GYNECOLOGY | Facility: CLINIC | Age: 69
End: 2019-01-18

## 2019-01-18 VITALS
DIASTOLIC BLOOD PRESSURE: 74 MMHG | BODY MASS INDEX: 27.16 KG/M2 | SYSTOLIC BLOOD PRESSURE: 130 MMHG | WEIGHT: 169 LBS | HEIGHT: 66 IN

## 2019-01-18 DIAGNOSIS — N95.0 POSTMENOPAUSAL BLEEDING: Primary | ICD-10-CM

## 2019-01-18 PROCEDURE — 58558 HYSTEROSCOPY BIOPSY: CPT | Performed by: OBSTETRICS & GYNECOLOGY

## 2019-01-18 PROCEDURE — 88305 TISSUE EXAM BY PATHOLOGIST: CPT | Performed by: OBSTETRICS & GYNECOLOGY

## 2019-01-18 PROCEDURE — 99203 OFFICE O/P NEW LOW 30 MIN: CPT | Performed by: OBSTETRICS & GYNECOLOGY

## 2019-01-18 NOTE — PROGRESS NOTES
"Subjective   Naina Manning is a 69 y.o. female.     CC: vaginal bleeding    History of Present Illness   Naina Manning is a 69 y.o. female here today for evaluation of postmenopausal bleeding. She went through menopause 20 years ago and for the past week she has had daily menstrual type vaginal bleeding.  The bleeding is lighter today.  There are no aggravating or alleviating factors.  There are no cramps. She is para 2 and is currently using Estrace cream for vaginal dryness. She has been evaluated by a pelvic ultrasound which shows an 16 mm stripe. Her symptoms have improved over the last 2 days.    Social History     Tobacco Use   • Smoking status: Never Smoker   • Smokeless tobacco: Never Used   Substance Use Topics   • Alcohol use: Yes     Comment: occ   • Drug use: No        Review of Systems   Constitutional: Negative for unexpected weight loss.   Gastrointestinal: Negative for abdominal pain.   Genitourinary: Positive for vaginal bleeding.   Hematological: Does not bruise/bleed easily.       Visit Vitals  /74 (BP Location: Left arm, Patient Position: Sitting)   Ht 167.6 cm (66\")   Wt 76.7 kg (169 lb)   BMI 27.28 kg/m²      Objective   Physical Exam   Constitutional: She appears well-developed. No distress.   Cardiovascular: Normal rate.   Pulmonary/Chest: Effort normal.   Abdominal: Soft. There is no tenderness.   Genitourinary: Uterus normal and cervix normal. Pelvic exam was performed with patient supine. There is no tenderness on the right labia. There is no tenderness on the left labia. Right adnexum displays no tenderness. Left adnexum displays no tenderness. There is bleeding in the vagina. No foreign body in the vagina. No signs of injury around the vagina. No vaginal discharge found.   Neurological: She is alert.   Skin: Skin is warm and dry.   Vitals reviewed.    I have reviewed the pelvic ultrasound report from [facility], which shows for small uterine fibroids and a 16 mm " endometrial stripe.  There were no adnexal abnormalities noted.    We have discussed the procedure of office hysteroscopy, common side effects, and potential adverse events like uterine perforation, infection, or bleeding.  She has given verbal consent.    The cervix was visualized with a speculum. The cervix was then cleansed with BETADINE swabs.  The anterior lip was grasped with a single-tooth tenaculum.  The cervical canal was identified with an os finder. An endometrial biopsy was then performed. A standard endometrial sampling Pipelle was passed into the uterine cavity.  The patient experienced mild cramping.  A small amount of tissue was obtained with 1 pass.  The Endosee device was flushed with sterile saline.  The device was placed in the cervix and slowly advanced while injecting sterile saline. The uterine cavity was inspected and both tubal ostia identified. The cavity appeared normal without obvious lesions. The device was removed and the saline aspirated. The tenaculum was removed and the site was hemostatic.  She tolerated the procedure well.      Assessment/Plan   Naina was seen today for vaginal bleeding.    Diagnoses and all orders for this visit:    Postmenopausal bleeding  -     Tissue Pathology Exam      We discussed the normal findings on office hysteroscopy.  We will notify her when the biopsy results return.  Since the prior ultrasound was done while she was actively bleeding, her lining may have been thickened by clot.  She will followup after the biopsy results and we may repeat a pelvic ultrasound to reimage the lining.

## 2019-01-22 LAB
CYTO UR: NORMAL
LAB AP CASE REPORT: NORMAL
LAB AP DIAGNOSIS COMMENT: NORMAL
PATH REPORT.FINAL DX SPEC: NORMAL
PATH REPORT.GROSS SPEC: NORMAL

## 2019-01-23 RX ORDER — MEDROXYPROGESTERONE ACETATE 5 MG/1
5 TABLET ORAL DAILY
Qty: 30 TABLET | Refills: 1 | Status: SHIPPED | OUTPATIENT
Start: 2019-01-23 | End: 2019-03-04

## 2019-01-25 ENCOUNTER — CLINICAL SUPPORT (OUTPATIENT)
Dept: FAMILY MEDICINE CLINIC | Facility: CLINIC | Age: 69
End: 2019-01-25

## 2019-01-25 DIAGNOSIS — R10.9 ABDOMINAL PAIN, UNSPECIFIED ABDOMINAL LOCATION: Primary | ICD-10-CM

## 2019-01-25 LAB
BILIRUB BLD-MCNC: NEGATIVE MG/DL
CLARITY, POC: CLEAR
COLOR UR: YELLOW
GLUCOSE UR STRIP-MCNC: NEGATIVE MG/DL
KETONES UR QL: NEGATIVE
LEUKOCYTE EST, POC: NEGATIVE
NITRITE UR-MCNC: NEGATIVE MG/ML
PH UR: 6 [PH] (ref 5–8)
PROT UR STRIP-MCNC: NEGATIVE MG/DL
RBC # UR STRIP: NEGATIVE /UL
SP GR UR: 1.01 (ref 1–1.03)
UROBILINOGEN UR QL: NORMAL

## 2019-01-25 PROCEDURE — 81003 URINALYSIS AUTO W/O SCOPE: CPT | Performed by: FAMILY MEDICINE

## 2019-02-07 ENCOUNTER — TELEPHONE (OUTPATIENT)
Dept: OBSTETRICS AND GYNECOLOGY | Facility: CLINIC | Age: 69
End: 2019-02-07

## 2019-02-07 NOTE — TELEPHONE ENCOUNTER
Pt was given provera to take x30 days on 1/23/19. Pt states she is bleeding again today. Pt informed this is expected and to continue taking the provera daily as prescribed and keep her appt for US and OV with Dr Ty. Dr Ty aware. Pt voiced understanding.

## 2019-03-04 ENCOUNTER — PROCEDURE VISIT (OUTPATIENT)
Dept: OBSTETRICS AND GYNECOLOGY | Facility: CLINIC | Age: 69
End: 2019-03-04

## 2019-03-04 ENCOUNTER — OFFICE VISIT (OUTPATIENT)
Dept: OBSTETRICS AND GYNECOLOGY | Facility: CLINIC | Age: 69
End: 2019-03-04

## 2019-03-04 VITALS
HEIGHT: 66 IN | DIASTOLIC BLOOD PRESSURE: 74 MMHG | BODY MASS INDEX: 27.32 KG/M2 | SYSTOLIC BLOOD PRESSURE: 122 MMHG | WEIGHT: 170 LBS

## 2019-03-04 DIAGNOSIS — R19.00 PELVIC MASS IN FEMALE: ICD-10-CM

## 2019-03-04 DIAGNOSIS — N95.0 POSTMENOPAUSAL BLEEDING: Primary | ICD-10-CM

## 2019-03-04 PROCEDURE — 99213 OFFICE O/P EST LOW 20 MIN: CPT | Performed by: OBSTETRICS & GYNECOLOGY

## 2019-03-04 PROCEDURE — 76830 TRANSVAGINAL US NON-OB: CPT | Performed by: OBSTETRICS & GYNECOLOGY

## 2019-03-04 NOTE — PROGRESS NOTES
"Naina Manning is a 69 y.o. female here today to follow-up postmenopausal bleeding.  Her endometrial biopsy from 6 weeks ago showed benign proliferative endometrium.  She has taken Provera since the biopsy results returned and reports that about 1 month ago she had a few days of light bleeding, but has not had any bleeding since then.  She stopped taking the Provera 1 week ago when the bottle was empty, and she did not refill it.  Her prior ultrasound had showed a 16 mm endometrium.    Visit Vitals  /74 (BP Location: Left arm, Patient Position: Sitting)   Ht 167.6 cm (66\")   Wt 77.1 kg (170 lb)   BMI 27.44 kg/m²     Pleasant female no acute distress  Mood and affect normal  Breathing unlabored    A pelvic ultrasound performed in the office today shows a endometrial stripe of 4.9 mm.  The left ovary is normal, the right ovary appears echogenic and measures 4.3 x 2 cm.  There is no free fluid noted.     Assessment: Postmenopausal bleeding, Pelvic mass    She reports that she is not taking exogenous estrogen outside of the estrogen vaginal cream.  We will check an estradiol level as well as basic ovarian tumor markers due to the enlarged appearing right ovary.  She prefers not to take the Provera, therefore we will expectantly manage her bleeding and see if any recurs.  She will return to the office in 3 months to reevaluate her symptoms, and we will let her know with the lab results return.  In the meantime if she has any significant bleeding she will contact the office.      "

## 2019-03-05 LAB
CANCER AG125 SERPL-ACNC: 14.2 U/ML (ref 0–38.1)
ESTRADIOL SERPL-MCNC: 149.3 PG/ML
HE4 SERPL-SCNC: 51.8 PMOL/L (ref 0–96.5)
LABORATORY COMMENT REPORT: NORMAL
POSTMENOPAUSAL INTERP: LOW: NORMAL
PREMENOPAUSAL INTERP: LOW: NORMAL
ROMA SCORE POSTMEN SERPL: 1.15
ROMA SCORE PREMEN SERPL: 0.8

## 2019-03-19 ENCOUNTER — OFFICE VISIT (OUTPATIENT)
Dept: OBSTETRICS AND GYNECOLOGY | Facility: CLINIC | Age: 69
End: 2019-03-19

## 2019-03-19 VITALS
SYSTOLIC BLOOD PRESSURE: 118 MMHG | DIASTOLIC BLOOD PRESSURE: 84 MMHG | BODY MASS INDEX: 27.64 KG/M2 | WEIGHT: 172 LBS | HEIGHT: 66 IN

## 2019-03-19 DIAGNOSIS — N83.8 MASS OF RIGHT OVARY: Primary | ICD-10-CM

## 2019-03-19 PROCEDURE — 99213 OFFICE O/P EST LOW 20 MIN: CPT | Performed by: OBSTETRICS & GYNECOLOGY

## 2019-03-19 RX ORDER — SODIUM CHLORIDE 9 MG/ML
100 INJECTION, SOLUTION INTRAVENOUS CONTINUOUS
Status: CANCELLED | OUTPATIENT
Start: 2019-03-19

## 2019-03-19 RX ORDER — LEVOTHYROXINE SODIUM 0.05 MG/1
50 TABLET ORAL EVERY MORNING
Qty: 90 TABLET | Refills: 0 | Status: SHIPPED | OUTPATIENT
Start: 2019-03-19 | End: 2019-06-03 | Stop reason: SDUPTHER

## 2019-03-19 NOTE — PROGRESS NOTES
"Naina Manning is a 69 y.o. female here today to discuss management of a right ovarian mass.  She was evaluated for postmenopausal bleeding and found to have proliferative endometrium on biopsy.  She responded well to progestin therapy and a repeat pelvic ultrasound showed a thin endometrium and her bleeding stopped.  She is on vaginal estrogen cream, and ovarian tumor markers were normal.  However, an estradiol level was 148.  She is not taking any other exogenous estrogen.    Past Surgical History:   Procedure Laterality Date   • CHOLECYSTECTOMY     • COLONOSCOPY  10/04/2016    [facility] [place] - DR KING   • EYE SURGERY     • TUBAL ABDOMINAL LIGATION        Visit Vitals  /84 (BP Location: Left arm, Patient Position: Sitting)   Ht 167.6 cm (65.98\")   Wt 78 kg (172 lb)   BMI 27.78 kg/m²     Pleasant female no acute distress  Mood and affect normal  Breathing unlabored    Assessment: Right ovarian mass, elevated estrogen level    I would not expect her vaginal estrogen cream to yield such a high serum estradiol level.  Given the enlarged appearance of her ovary on ultrasound, she could have an estrogen secreting tumor.  I have spoken with GYN oncology about surgical management.  I discussed surgical options with Naina including laparoscopic oophorectomy with or without hysterectomy.  We discussed a small possibility of finding gross disease on laparoscopy, or pathologic findings which may require a second surgery.  She would like to have her procedure performed here in town, instead of traveling to [place].  We discussed a planned surgical procedure of laparoscopic BSO, as she would like to retain her uterus if possible.  We discussed a small chance of needing to go back for a hysterectomy.  She is scheduled for surgery on April 24 and return to the office to discuss the procedure in a few weeks.    "

## 2019-03-21 ENCOUNTER — OFFICE VISIT (OUTPATIENT)
Dept: FAMILY MEDICINE CLINIC | Facility: CLINIC | Age: 69
End: 2019-03-21

## 2019-03-21 VITALS
BODY MASS INDEX: 28.66 KG/M2 | HEIGHT: 65 IN | HEART RATE: 98 BPM | TEMPERATURE: 98.7 F | WEIGHT: 172 LBS | DIASTOLIC BLOOD PRESSURE: 98 MMHG | SYSTOLIC BLOOD PRESSURE: 144 MMHG | OXYGEN SATURATION: 96 %

## 2019-03-21 DIAGNOSIS — R52 PAIN: Primary | ICD-10-CM

## 2019-03-21 PROCEDURE — 99213 OFFICE O/P EST LOW 20 MIN: CPT | Performed by: FAMILY MEDICINE

## 2019-03-21 NOTE — PROGRESS NOTES
Subjective   Naina Manning is a 69 y.o. female.     Patient fell while walking and injured her right knee.      Lower Extremity Issue   This is a new problem. The current episode started today. The problem occurs constantly. The problem has been unchanged. Associated symptoms comments: Fell while walking directly on the kneecap-patellar tendon swollen-collateral ligaments and cruciates okay. The symptoms are aggravated by bending. She has tried ice for the symptoms. The treatment provided moderate relief.       The following portions of the patient's history were reviewed and updated as appropriate: allergies, current medications, past family history, past medical history, past social history, past surgical history and problem list.    Review of Systems   Genitourinary:        Can you need to have bilateral oophorectomy   Musculoskeletal:        Right knee- patellar tendon swelling with abrasion-wound care ice elevation -doubt will need x-ray       Objective   Physical Exam   Musculoskeletal: She exhibits edema and tenderness.   Abrasion right knee-patellar tendon swelling-cruciate and collateral ligaments intact   Neurological: She is alert.   Psychiatric: She has a normal mood and affect. Her behavior is normal. Judgment and thought content normal.   Nursing note and vitals reviewed.      Assessment/Plan   Naina was seen today for knee injury.    Diagnoses and all orders for this visit:    Pain         Plan symptomatic treatment wound care ice elevation observation

## 2019-03-24 ENCOUNTER — RESULTS ENCOUNTER (OUTPATIENT)
Dept: OBSTETRICS AND GYNECOLOGY | Facility: CLINIC | Age: 69
End: 2019-03-24

## 2019-03-24 DIAGNOSIS — N83.8 MASS OF RIGHT OVARY: ICD-10-CM

## 2019-03-28 ENCOUNTER — OFFICE VISIT (OUTPATIENT)
Dept: FAMILY MEDICINE CLINIC | Facility: CLINIC | Age: 69
End: 2019-03-28

## 2019-03-28 VITALS
HEIGHT: 65 IN | SYSTOLIC BLOOD PRESSURE: 124 MMHG | TEMPERATURE: 98.6 F | BODY MASS INDEX: 28.56 KG/M2 | WEIGHT: 171.4 LBS | OXYGEN SATURATION: 98 % | HEART RATE: 86 BPM | DIASTOLIC BLOOD PRESSURE: 88 MMHG

## 2019-03-28 DIAGNOSIS — J06.9 ACUTE URI: Primary | ICD-10-CM

## 2019-03-28 PROCEDURE — 99213 OFFICE O/P EST LOW 20 MIN: CPT | Performed by: FAMILY MEDICINE

## 2019-03-28 PROCEDURE — 96372 THER/PROPH/DIAG INJ SC/IM: CPT | Performed by: FAMILY MEDICINE

## 2019-03-28 RX ORDER — AZITHROMYCIN 250 MG/1
TABLET, FILM COATED ORAL
Qty: 6 TABLET | Refills: 0 | Status: SHIPPED | OUTPATIENT
Start: 2019-03-28 | End: 2019-04-09

## 2019-03-28 RX ORDER — METHYLPREDNISOLONE ACETATE 40 MG/ML
20 INJECTION, SUSPENSION INTRA-ARTICULAR; INTRALESIONAL; INTRAMUSCULAR; SOFT TISSUE ONCE
Status: COMPLETED | OUTPATIENT
Start: 2019-03-28 | End: 2019-03-28

## 2019-03-28 RX ADMIN — METHYLPREDNISOLONE ACETATE 20 MG: 40 INJECTION, SUSPENSION INTRA-ARTICULAR; INTRALESIONAL; INTRAMUSCULAR; SOFT TISSUE at 09:10

## 2019-03-28 NOTE — PROGRESS NOTES
Subjective   Naina Manning is a 69 y.o. female.     Acute sinusitis with history of hypertension      Sinusitis   This is a new problem. The current episode started yesterday. The problem has been gradually worsening since onset. There has been no fever. Associated symptoms include congestion, coughing, a hoarse voice, sinus pressure and a sore throat. Past treatments include nothing.       The following portions of the patient's history were reviewed and updated as appropriate: allergies, current medications, past family history, past medical history, past social history, past surgical history and problem list.    Review of Systems   HENT: Positive for congestion, hoarse voice, sinus pressure and sore throat.    Respiratory: Positive for cough.        Objective   Physical Exam   Constitutional: She is oriented to person, place, and time.   HENT:   Right Ear: External ear normal.   Left Ear: External ear normal.   Mouth/Throat: Oropharyngeal exudate present.   Cardiovascular: Normal rate and regular rhythm.   Pulmonary/Chest: Effort normal and breath sounds normal. She has no wheezes. She has no rales.   Lymphadenopathy:     She has no cervical adenopathy.   Neurological: She is alert and oriented to person, place, and time.   Psychiatric: She has a normal mood and affect. Her behavior is normal. Judgment and thought content normal.   Nursing note and vitals reviewed.      Assessment/Plan   Patient Active Problem List   Diagnosis   • Body mass index (BMI) of 25.0 to 29.9   • Essential hypertension   • Hypertension   • Hypothyroid   • Hypothyroidism   • Seasonal allergic rhinitis   • Mass of right ovary     Naina was seen today for sore throat and cough.    Diagnoses and all orders for this visit:    Acute URI  -     methylPREDNISolone acetate (DEPO-medrol) injection 20 mg    Other orders  -     azithromycin (ZITHROMAX Z-RAJI) 250 MG tablet; Take 2 tablets the first day, then 1 tablet daily for 4 days.       Return if  symptoms worsen or fail to improve, for Next scheduled follow up, Annual physical.    Plan-above is getting ready for pelvic surgery

## 2019-04-08 RX ORDER — LOSARTAN POTASSIUM 100 MG/1
TABLET ORAL
Qty: 90 TABLET | Refills: 0 | Status: SHIPPED | OUTPATIENT
Start: 2019-04-08 | End: 2019-06-03 | Stop reason: SDUPTHER

## 2019-04-08 RX ORDER — OMEPRAZOLE 40 MG/1
CAPSULE, DELAYED RELEASE ORAL
Qty: 90 CAPSULE | Refills: 0 | Status: SHIPPED | OUTPATIENT
Start: 2019-04-08 | End: 2019-06-03 | Stop reason: SDUPTHER

## 2019-04-08 RX ORDER — CLONIDINE HYDROCHLORIDE 0.1 MG/1
TABLET ORAL
Qty: 90 TABLET | Refills: 0 | Status: SHIPPED | OUTPATIENT
Start: 2019-04-08 | End: 2019-06-03 | Stop reason: SDUPTHER

## 2019-04-08 RX ORDER — AMLODIPINE BESYLATE 10 MG/1
TABLET ORAL
Qty: 90 TABLET | Refills: 0 | Status: SHIPPED | OUTPATIENT
Start: 2019-04-08 | End: 2019-06-03 | Stop reason: SDUPTHER

## 2019-04-08 RX ORDER — INDAPAMIDE 2.5 MG/1
TABLET, FILM COATED ORAL
Qty: 90 TABLET | Refills: 0 | Status: SHIPPED | OUTPATIENT
Start: 2019-04-08 | End: 2019-06-03 | Stop reason: SDUPTHER

## 2019-04-09 ENCOUNTER — APPOINTMENT (OUTPATIENT)
Dept: PREADMISSION TESTING | Facility: HOSPITAL | Age: 69
End: 2019-04-09

## 2019-04-09 ENCOUNTER — OFFICE VISIT (OUTPATIENT)
Dept: OBSTETRICS AND GYNECOLOGY | Facility: CLINIC | Age: 69
End: 2019-04-09

## 2019-04-09 VITALS
WEIGHT: 168 LBS | HEIGHT: 66 IN | DIASTOLIC BLOOD PRESSURE: 84 MMHG | SYSTOLIC BLOOD PRESSURE: 124 MMHG | BODY MASS INDEX: 27 KG/M2

## 2019-04-09 VITALS
WEIGHT: 170.64 LBS | HEART RATE: 68 BPM | RESPIRATION RATE: 18 BRPM | BODY MASS INDEX: 28.43 KG/M2 | DIASTOLIC BLOOD PRESSURE: 79 MMHG | SYSTOLIC BLOOD PRESSURE: 129 MMHG | OXYGEN SATURATION: 98 % | HEIGHT: 65 IN

## 2019-04-09 DIAGNOSIS — N83.8 MASS OF RIGHT OVARY: ICD-10-CM

## 2019-04-09 DIAGNOSIS — N83.8 MASS OF RIGHT OVARY: Primary | ICD-10-CM

## 2019-04-09 LAB
ANION GAP SERPL CALCULATED.3IONS-SCNC: 12 MMOL/L (ref 4–13)
BUN BLD-MCNC: 22 MG/DL (ref 5–21)
BUN/CREAT SERPL: 31.9 (ref 7–25)
CALCIUM SPEC-SCNC: 9.5 MG/DL (ref 8.4–10.4)
CHLORIDE SERPL-SCNC: 97 MMOL/L (ref 98–110)
CO2 SERPL-SCNC: 27 MMOL/L (ref 24–31)
CREAT BLD-MCNC: 0.69 MG/DL (ref 0.5–1.4)
DEPRECATED RDW RBC AUTO: 40.1 FL (ref 40–54)
ERYTHROCYTE [DISTWIDTH] IN BLOOD BY AUTOMATED COUNT: 12.7 % (ref 12–15)
GFR SERPL CREATININE-BSD FRML MDRD: 85 ML/MIN/1.73
GLUCOSE BLD-MCNC: 85 MG/DL (ref 70–100)
HCT VFR BLD AUTO: 42.3 % (ref 37–47)
HGB BLD-MCNC: 14.4 G/DL (ref 12–16)
MCH RBC QN AUTO: 29.5 PG (ref 28–32)
MCHC RBC AUTO-ENTMCNC: 34 G/DL (ref 33–36)
MCV RBC AUTO: 86.7 FL (ref 82–98)
PLATELET # BLD AUTO: 459 10*3/MM3 (ref 130–400)
PMV BLD AUTO: 10.5 FL (ref 6–12)
POTASSIUM BLD-SCNC: 4 MMOL/L (ref 3.5–5.3)
RBC # BLD AUTO: 4.88 10*6/MM3 (ref 4.2–5.4)
SODIUM BLD-SCNC: 136 MMOL/L (ref 135–145)
WBC NRBC COR # BLD: 8.44 10*3/MM3 (ref 4.8–10.8)

## 2019-04-09 PROCEDURE — 85027 COMPLETE CBC AUTOMATED: CPT | Performed by: OBSTETRICS & GYNECOLOGY

## 2019-04-09 PROCEDURE — 80048 BASIC METABOLIC PNL TOTAL CA: CPT | Performed by: OBSTETRICS & GYNECOLOGY

## 2019-04-09 PROCEDURE — 93005 ELECTROCARDIOGRAM TRACING: CPT

## 2019-04-09 PROCEDURE — 36415 COLL VENOUS BLD VENIPUNCTURE: CPT | Performed by: OBSTETRICS & GYNECOLOGY

## 2019-04-09 PROCEDURE — 99213 OFFICE O/P EST LOW 20 MIN: CPT | Performed by: OBSTETRICS & GYNECOLOGY

## 2019-04-09 PROCEDURE — 93010 ELECTROCARDIOGRAM REPORT: CPT | Performed by: INTERNAL MEDICINE

## 2019-04-09 RX ORDER — ACETAMINOPHEN 500 MG
1000 TABLET ORAL 2 TIMES DAILY
COMMUNITY

## 2019-04-09 NOTE — H&P (VIEW-ONLY)
[facility]  Naina Manning  : 1950  MRN: [id number]  [facility]: [id number]    History and Physical    Subjective   Naina Manning is a 69 y.o. year old  who presents for surgery due to elevated serum estradiol and an enlarged right ovary.  On ultrasound her right ovary measures 4 cm.  She has a history of postmenopausal bleeding and endometrial biopsy revealed proliferative endometrium.    Past Medical History:   Diagnosis Date   • Hyperlipidemia    • Hypertension    • Hypothyroidism      Past Surgical History:   Procedure Laterality Date   • CHOLECYSTECTOMY     • COLONOSCOPY  10/04/2016    [facility] [place]ING   • EYE SURGERY     • TUBAL ABDOMINAL LIGATION       Social History    Tobacco Use      Smoking status: Never Smoker      Smokeless tobacco: Never Used      Current Outpatient Medications:   •  albuterol sulfate HFA (VENTOLIN HFA) 108 (90 Base) MCG/ACT inhaler, Inhale 2 puffs Every 4 (Four) Hours As Needed for Wheezing., Disp: 1 inhaler, Rfl: 6  •  amLODIPine (NORVASC) 10 MG tablet, TAKE 1 TABLET DAILY, Disp: 90 tablet, Rfl: 0  •  cholecalciferol (VITAMIN D3) 1000 units tablet, Take 1,000 Units by mouth Daily., Disp: , Rfl:   •  CloNIDine (CATAPRES) 0.1 MG tablet, TAKE 1 TABLET EVERY NIGHT, Disp: 90 tablet, Rfl: 0  •  CloNIDine (CATAPRES) 0.2 MG tablet, Take 1 tablet by mouth Every Morning., Disp: 90 tablet, Rfl: 3  •  estradiol (ESTRACE VAGINAL) 0.1 MG/GM vaginal cream, Insert 2 g into the vagina Daily., Disp: 42.5 g, Rfl: 12  •  fluticasone (FLONASE) 50 MCG/ACT nasal spray, USE 1 SPRAY IN EACH NOSTRIL TWICE A DAY, Disp: 16 g, Rfl: 3  •  indapamide (LOZOL) 2.5 MG tablet, TAKE 1 TABLET DAILY, Disp: 90 tablet, Rfl: 0  •  levothyroxine (SYNTHROID, LEVOTHROID) 50 MCG tablet, Take 1 tablet by mouth Every Morning., Disp: 90 tablet, Rfl: 0  •  losartan (COZAAR) 100 MG tablet, TAKE 1 TABLET EVERY MORNING, Disp: 90 tablet, Rfl: 0  •  Multiple Vitamins-Minerals (MULTIVITAMIN ADULTS 50+ PO), Take  by mouth., Disp: ,  "Rfl:   •  omeprazole (priLOSEC) 40 MG capsule, TAKE 1 CAPSULE DAILY, Disp: 90 capsule, Rfl: 0  •  pravastatin (PRAVACHOL) 40 MG tablet, TAKE 1 TABLET EVERY NIGHT, Disp: 90 tablet, Rfl: 1    Allergies   Allergen Reactions   • Sulfa Antibiotics Hives   • Penicillins Hives   • Prednisone Other (See Comments)     thrush       Family History   Problem Relation Age of Onset   • Cancer Mother    • Cancer Father    • No Known Problems Son    • Heart disease Maternal Grandmother    • No Known Problems Maternal Grandfather    • No Known Problems Paternal Grandmother    • No Known Problems Paternal Grandfather    • No Known Problems Son      Review of Systems   Constitutional: Negative for unexpected weight change.   HENT: Negative for trouble swallowing.    Respiratory: Negative for shortness of breath.    Cardiovascular: Negative for chest pain.   Musculoskeletal: Negative for neck stiffness.   Neurological: Negative for seizures.   Hematological: Does not bruise/bleed easily.         Objective   /84 (BP Location: Left arm, Patient Position: Sitting)   Ht 167.6 cm (65.98\")   Wt 76.2 kg (168 lb)   BMI 27.13 kg/m²     Physical Exam   Physical Exam   Constitutional: She is oriented to person, place, and time. She appears well-developed and well-nourished.   HENT:   Head: Normocephalic and atraumatic.   Eyes: No scleral icterus.   Neck: No tracheal deviation present.   Cardiovascular: Normal rate and regular rhythm.   Pulmonary/Chest: Effort normal and breath sounds normal.   Abdominal: Soft. Bowel sounds are normal. She exhibits no distension. There is no tenderness.   Genitourinary: Uterus normal. Rectal exam shows no external hemorrhoid. Pelvic exam was performed with patient supine. There is no lesion on the right labia. There is no lesion on the left labia. Uterus is not enlarged, not fixed and not tender. Cervix exhibits no motion tenderness. Right adnexum displays no mass. Left adnexum displays no mass. No " bleeding in the vagina. No vaginal discharge found.   Lymphadenopathy:        Right: No inguinal adenopathy present.        Left: No inguinal adenopathy present.   Neurological: She is alert and oriented to person, place, and time.   Skin: Skin is warm and dry.   Vitals reviewed.      Labs  Lab Results   Component Value Date     01/09/2019    HGB 14.7 01/09/2019    HCT 44.1 01/09/2019    WBC 4.40 (L) 01/09/2019     (L) 01/09/2019    K 3.7 01/09/2019    CL 91 (L) 01/09/2019    CO2 29.0 01/09/2019    BUN 14 01/09/2019    CREATININE 0.58 01/09/2019    ALBUMIN 4.20 01/09/2019    CALCIUM 9.7 01/09/2019    AST 39 01/09/2019    ALT 46 01/09/2019    BILITOT 0.3 01/09/2019        Assessment & Plan    Naina was seen today for pre-op exam.    Diagnoses and all orders for this visit:    Mass of right ovary    Risks, benefits, and alternatives of a laparoscopic BSO were discussed with the patient in detail. Intraoperative risks of bleeding and damage to surrounding organs, including but not limited to intestine, bladder and ureter, were explained. Management of these were also explained. Postoperative complications such as infection, pneumonia, DVT, and bleeding were explained. The importance of compliance with postoperative restrictions was discussed. Laparoscopic approach was explained. Indications for conversion to a laparotomy were discussed.     All of the patient's questions were answered to her satisfaction. She was encouraged to return for an additional appointment if she had further questions. She verbalized understanding of the above and wished to proceed with the outlined plan.    Aashish Ty MD  4/9/2019  8:39 AM

## 2019-04-09 NOTE — H&P
[facility]  Naina Manning  : 1950  MRN: [id number]  [facility]: [id number]    History and Physical    Subjective   Naina Manning is a 69 y.o. year old  who presents for surgery due to elevated serum estradiol and an enlarged right ovary.  On ultrasound her right ovary measures 4 cm.  She has a history of postmenopausal bleeding and endometrial biopsy revealed proliferative endometrium.    Past Medical History:   Diagnosis Date   • Hyperlipidemia    • Hypertension    • Hypothyroidism      Past Surgical History:   Procedure Laterality Date   • CHOLECYSTECTOMY     • COLONOSCOPY  10/04/2016    [facility] [place] - DR KING   • EYE SURGERY     • TUBAL ABDOMINAL LIGATION       Social History    Tobacco Use      Smoking status: Never Smoker      Smokeless tobacco: Never Used      Current Outpatient Medications:   •  albuterol sulfate HFA (VENTOLIN HFA) 108 (90 Base) MCG/ACT inhaler, Inhale 2 puffs Every 4 (Four) Hours As Needed for Wheezing., Disp: 1 inhaler, Rfl: 6  •  amLODIPine (NORVASC) 10 MG tablet, TAKE 1 TABLET DAILY, Disp: 90 tablet, Rfl: 0  •  cholecalciferol (VITAMIN D3) 1000 units tablet, Take 1,000 Units by mouth Daily., Disp: , Rfl:   •  CloNIDine (CATAPRES) 0.1 MG tablet, TAKE 1 TABLET EVERY NIGHT, Disp: 90 tablet, Rfl: 0  •  CloNIDine (CATAPRES) 0.2 MG tablet, Take 1 tablet by mouth Every Morning., Disp: 90 tablet, Rfl: 3  •  estradiol (ESTRACE VAGINAL) 0.1 MG/GM vaginal cream, Insert 2 g into the vagina Daily., Disp: 42.5 g, Rfl: 12  •  fluticasone (FLONASE) 50 MCG/ACT nasal spray, USE 1 SPRAY IN EACH NOSTRIL TWICE A DAY, Disp: 16 g, Rfl: 3  •  indapamide (LOZOL) 2.5 MG tablet, TAKE 1 TABLET DAILY, Disp: 90 tablet, Rfl: 0  •  levothyroxine (SYNTHROID, LEVOTHROID) 50 MCG tablet, Take 1 tablet by mouth Every Morning., Disp: 90 tablet, Rfl: 0  •  losartan (COZAAR) 100 MG tablet, TAKE 1 TABLET EVERY MORNING, Disp: 90 tablet, Rfl: 0  •  Multiple Vitamins-Minerals (MULTIVITAMIN ADULTS 50+ PO), Take  by mouth., Disp: ,  "Rfl:   •  omeprazole (priLOSEC) 40 MG capsule, TAKE 1 CAPSULE DAILY, Disp: 90 capsule, Rfl: 0  •  pravastatin (PRAVACHOL) 40 MG tablet, TAKE 1 TABLET EVERY NIGHT, Disp: 90 tablet, Rfl: 1    Allergies   Allergen Reactions   • Sulfa Antibiotics Hives   • Penicillins Hives   • Prednisone Other (See Comments)     thrush       Family History   Problem Relation Age of Onset   • Cancer Mother    • Cancer Father    • No Known Problems Son    • Heart disease Maternal Grandmother    • No Known Problems Maternal Grandfather    • No Known Problems Paternal Grandmother    • No Known Problems Paternal Grandfather    • No Known Problems Son      Review of Systems   Constitutional: Negative for unexpected weight change.   HENT: Negative for trouble swallowing.    Respiratory: Negative for shortness of breath.    Cardiovascular: Negative for chest pain.   Musculoskeletal: Negative for neck stiffness.   Neurological: Negative for seizures.   Hematological: Does not bruise/bleed easily.         Objective   /84 (BP Location: Left arm, Patient Position: Sitting)   Ht 167.6 cm (65.98\")   Wt 76.2 kg (168 lb)   BMI 27.13 kg/m²     Physical Exam   Physical Exam   Constitutional: She is oriented to person, place, and time. She appears well-developed and well-nourished.   HENT:   Head: Normocephalic and atraumatic.   Eyes: No scleral icterus.   Neck: No tracheal deviation present.   Cardiovascular: Normal rate and regular rhythm.   Pulmonary/Chest: Effort normal and breath sounds normal.   Abdominal: Soft. Bowel sounds are normal. She exhibits no distension. There is no tenderness.   Genitourinary: Uterus normal. Rectal exam shows no external hemorrhoid. Pelvic exam was performed with patient supine. There is no lesion on the right labia. There is no lesion on the left labia. Uterus is not enlarged, not fixed and not tender. Cervix exhibits no motion tenderness. Right adnexum displays no mass. Left adnexum displays no mass. No " bleeding in the vagina. No vaginal discharge found.   Lymphadenopathy:        Right: No inguinal adenopathy present.        Left: No inguinal adenopathy present.   Neurological: She is alert and oriented to person, place, and time.   Skin: Skin is warm and dry.   Vitals reviewed.      Labs  Lab Results   Component Value Date     01/09/2019    HGB 14.7 01/09/2019    HCT 44.1 01/09/2019    WBC 4.40 (L) 01/09/2019     (L) 01/09/2019    K 3.7 01/09/2019    CL 91 (L) 01/09/2019    CO2 29.0 01/09/2019    BUN 14 01/09/2019    CREATININE 0.58 01/09/2019    ALBUMIN 4.20 01/09/2019    CALCIUM 9.7 01/09/2019    AST 39 01/09/2019    ALT 46 01/09/2019    BILITOT 0.3 01/09/2019        Assessment & Plan    Naina was seen today for pre-op exam.    Diagnoses and all orders for this visit:    Mass of right ovary    Risks, benefits, and alternatives of a laparoscopic BSO were discussed with the patient in detail. Intraoperative risks of bleeding and damage to surrounding organs, including but not limited to intestine, bladder and ureter, were explained. Management of these were also explained. Postoperative complications such as infection, pneumonia, DVT, and bleeding were explained. The importance of compliance with postoperative restrictions was discussed. Laparoscopic approach was explained. Indications for conversion to a laparotomy were discussed.     All of the patient's questions were answered to her satisfaction. She was encouraged to return for an additional appointment if she had further questions. She verbalized understanding of the above and wished to proceed with the outlined plan.    Aashish Ty MD  4/9/2019  8:39 AM

## 2019-04-09 NOTE — PROGRESS NOTES
"Naina Manning is a 69 y.o. female here today to discuss treatment of an enlarged right ovary with elevated serum estradiol.  She has a history of post mucosal bleeding and endometrial biopsy revealed proliferative endometrium.  She is not on systemic hormone replacement therapy.  She does not take any over-the-counter estrogen like supplements.    Visit Vitals  /84 (BP Location: Left arm, Patient Position: Sitting)   Ht 167.6 cm (65.98\")   Wt 76.2 kg (168 lb)   BMI 27.13 kg/m²     Pleasant female no acute distress  Mood and affect normal  Breathing unlabored    Assessment: Enlarged right ovary, elevated serum estradiol    There are some tumors that secrete estradiol, and her ovary is enlarged, therefore we have discussed surgical management to include laparoscopy with BSO.  We discussed surgical risks of bleeding, infection, and damage to surrounding structures including bowel, bladder, ureters, and other viscera.  All of her questions were answered and she desires to proceed.  She is scheduled for surgery on April 24.    "

## 2019-04-09 NOTE — DISCHARGE INSTRUCTIONS
DAY OF SURGERY INSTRUCTIONS        YOUR SURGEON: dr aleisha andres    PROCEDURE: ***Bilateral SALPINGO OOPHORECTOMY LAPAROSCOPIC    DATE OF SURGERY: ***4/24/2019    ARRIVAL TIME: AS DIRECTED BY OFFICE    YOU MAY TAKE THE FOLLOWING MEDICATION(S) THE MORNING OF SURGERY WITH A SIP OF WATER: ***anesthesia request to not take losartan morning of surgery,  But may take clonidine, indapamide with sip of water    ALL OTHER HOME MEDICATION CHECK WITH YOUR PHYSICIAN                MANAGING PAIN AFTER SURGERY    We know you are probably wondering what your pain will be like after surgery.  Following surgery it is unrealistic to expect you will not have pain.   Pain is how our bodies let us know that something is wrong or cautions us to be careful.  That said, our goal is to make your pain tolerable.    Methods we may use to treat your pain include (oral or IV medications, PCAs, epidurals, nerve blocks, etc.)   While some procedures require IV pain medications for a short time after surgery, transitioning to pain medications by mouth allows for better management of pain.   Your nurse will encourage you to take oral pain medications whenever possible.  IV medications work almost immediately, but only last a short while.  Taking medications by mouth allows for a more constant level of medication in your blood stream for a longer period of time.      Once your pain is out of control it is harder to get back under control.  It is important you are aware when your next dose of pain medication is due.  If you are admitted, your nurse may write the time of your next dose on the white board in your room to help you remember.      We are interested in your pain and encourage you to inform us about aggravating factors during your visit.   Many times a simple repositioning every few hours can make a big difference.    If your physician says it is okay, do not let your pain prevent you from getting out of bed. Be sure to call your  nurse for assistance prior to getting up so you do not fall.      Before surgery, please decide your tolerable pain goal.  These faces help describe the pain ratings we use on a 0-10 scale.   Be prepared to tell us your goal and whether or not you take pain or anxiety medications at home.          BEFORE YOU COME TO THE HOSPITAL  (Pre-op instructions)  • Do not eat, drink, smoke or chew gum after midnight the night before surgery.  This also includes no mints.  • Morning of surgery take only the medicines you have been instructed with a sip of water unless otherwise instructed  by your physician.  • Do not shave, wear makeup or dark nail polish.  • Remove all jewelry including rings.  • Leave anything you consider valuable at home.  • Leave your suitcase in the car until after your surgery.  • Bring the following with you if applicable:  o Picture ID and insurance, Medicare or Medicaid cards  o Co-pay/deductible required by insurance (cash, check, credit card)  o Copy of advance directive, living will or power-of- documents if not brought to PAT  o CPAP or BIPAP mask and tubing  o Relaxation aids (MP3 player, book, magazine)  • On the day of surgery check in at registration located at the main entrance of the hospital.       Outpatient Surgery Guidelines, Adult  Outpatient procedures are those for which the person having the procedure is allowed to go home the same day as the procedure. Various procedures are done on an outpatient basis. You should follow some general guidelines if you will be having an outpatient procedure.  LET YOUR HEALTH CARE PROVIDER KNOW ABOUT:  · Any allergies you have.  · All medicines you are taking, including vitamins, herbs, eye drops, creams, and over-the-counter medicines.  · Previous problems you or members of your family have had with the use of anesthetics.  · Any blood disorders you have.  · Previous surgeries you have had.  · Medical conditions you have.  RISKS AND  COMPLICATIONS  Your health care provider will discuss possible risks and complications with you before surgery. Common risks and complications include:    · Problems due to the use of anesthetics.  · Blood loss and replacement (does not apply to minor surgical procedures).  · Temporary increase in pain due to surgery.  · Uncorrected pain or problems that the surgery was meant to correct.  · Infection.  · New damage.  BEFORE THE PROCEDURE  · Ask your health care provider about changing or stopping your regular medicines. You may need to stop taking certain medicines in the days or weeks before the procedure.  · Stop smoking at least 2 weeks before surgery. This lowers your risk for complications during and after surgery. Ask your health care provider for help with this if needed.  · Eat your usual meals and a light supper the day before surgery. Continue fluid intake. Do not drink alcohol.  · Do not eat or drink after midnight the night before your surgery.   · Arrange for someone to take you home and to stay with you for 24 hours after the procedure. Medicine given for your procedure may affect your ability to drive or to care for yourself.  · Call your health care provider's office if you develop an illness or problem that may prevent you from safely having your procedure.  AFTER THE PROCEDURE  After surgery, you will be taken to a recovery area, where your progress will be monitored. If there are no complications, you will be allowed to go home when you are awake, stable, and taking fluids well. You may have numbness around the surgical site. Healing will take some time. You will have tenderness at the surgical site and may have some swelling and bruising. You may also have some nausea.  HOME CARE INSTRUCTIONS  · Do not drive for 24 hours, or as directed by your health care provider. Do not drive while taking prescription pain medicines.  · Do not drink alcohol for 24 hours.  · Do not make important decisions or  sign legal documents for 24 hours.  · You may resume a normal diet and activities as directed.  · Do not lift anything heavier than 10 pounds (4.5 kg) or play contact sports until your health care provider says it is okay.  · Change your bandages (dressings) as directed.  · Only take over-the-counter or prescription medicines as directed by your health care provider.  · Follow up with your health care provider as directed.  SEEK MEDICAL CARE IF:  · You have increased bleeding (more than a small spot) from the surgical site.  · You have redness, swelling, or increasing pain in the wound.  · You see pus coming from the wound.  · You have a fever.  · You notice a bad smell coming from the wound or dressing.  · You feel lightheaded or faint.  · You develop a rash.  · You have trouble breathing.  · You develop allergies.  MAKE SURE YOU:  · Understand these instructions.  · Will watch your condition.  · Will get help right away if you are not doing well or get worse.     This information is not intended to replace advice given to you by your health care provider. Make sure you discuss any questions you have with your health care provider.     Document Released: 09/12/2002 Document Revised: 05/03/2016 Document Reviewed: 05/22/2014  [employer] Interactive Patient Education ©2016 [employer]       Fall Prevention in Hospitals, Adult  As a hospital patient, your condition and the treatments you receive can increase your risk for falls. Some additional risk factors for falls in a hospital include:  · Being in an unfamiliar environment.  · Being on bed rest.  · Your surgery.  · Taking certain medicines.  · Your tubing requirements, such as intravenous (IV) therapy or catheters.  It is important that you learn how to decrease fall risks while at the hospital. Below are important tips that can help prevent falls.  SAFETY TIPS FOR PREVENTING FALLS  Talk about your risk of falling.  · Ask your health care provider why you are at  risk for falling. Is it your medicine, illness, tubing placement, or something else?  · Make a plan with your health care provider to keep you safe from falls.  · Ask your health care provider or pharmacist about side effects of your medicines. Some medicines can make you dizzy or affect your coordination.  Ask for help.  · Ask for help before getting out of bed. You may need to press your call button.  · Ask for assistance in getting safely to the toilet.  · Ask for a walker or cane to be put at your bedside. Ask that most of the side rails on your bed be placed up before your health care provider leaves the room.  · Ask family or friends to sit with you.  · Ask for things that are out of your reach, such as your glasses, hearing aids, telephone, bedside table, or call button.  Follow these tips to avoid falling:  · Stay lying or seated, rather than standing, while waiting for help.  · Wear rubber-soled slippers or shoes whenever you walk in the hospital.  · Avoid quick, sudden movements.  ¨ Change positions slowly.  ¨ Sit on the side of your bed before standing.  ¨ Stand up slowly and wait before you start to walk.  · Let your health care provider know if there is a spill on the floor.  · Pay careful attention to the medical equipment, electrical cords, and tubes around you.  · When you need help, use your call button by your bed or in the bathroom. Wait for one of your health care providers to help you.  · If you feel dizzy or unsure of your footing, return to bed and wait for assistance.  · Avoid being distracted by the TV, telephone, or another person in your room.  · Do not lean or support yourself on rolling objects, such as IV poles or bedside tables.     This information is not intended to replace advice given to you by your health care provider. Make sure you discuss any questions you have with your health care provider.     Document Released: 12/15/2001 Document Revised: 01/08/2016 Document Reviewed:  08/25/2013  [employer] Interactive Patient Education ©2016 [employer]       Surgical Site Infections FAQs  What is a Surgical Site Infection (SSI)?  A surgical site infection is an infection that occurs after surgery in the part of the body where the surgery took place. Most patients who have surgery do not develop an infection. However, infections develop in about 1 to 3 out of every 100 patients who have surgery.  Some of the common symptoms of a surgical site infection are:  · Redness and pain around the area where you had surgery  · Drainage of cloudy fluid from your surgical wound  · Fever  Can SSIs be treated?  Yes. Most surgical site infections can be treated with antibiotics. The antibiotic given to you depends on the bacteria (germs) causing the infection. Sometimes patients with SSIs also need another surgery to treat the infection.  What are some of the things that hospitals are doing to prevent SSIs?  To prevent SSIs, doctors, nurses, and other healthcare providers:  · Clean their hands and arms up to their elbows with an antiseptic agent just before the surgery.  · Clean their hands with soap and water or an alcohol-based hand rub before and after caring for each patient.  · May remove some of your hair immediately before your surgery using electric clippers if the hair is in the same area where the procedure will occur. They should not shave you with a razor.  · Wear special hair covers, masks, gowns, and gloves during surgery to keep the surgery area clean.  · Give you antibiotics before your surgery starts. In most cases, you should get antibiotics within 60 minutes before the surgery starts and the antibiotics should be stopped within 24 hours after surgery.  · Clean the skin at the site of your surgery with a special soap that kills germs.  What can I do to help prevent SSIs?  Before your surgery:  · Tell your doctor about other medical problems you may have. Health problems such as allergies,  diabetes, and obesity could affect your surgery and your treatment.  · Quit smoking. Patients who smoke get more infections. Talk to your doctor about how you can quit before your surgery.  · Do not shave near where you will have surgery. Shaving with a razor can irritate your skin and make it easier to develop an infection.  At the time of your surgery:  · Speak up if someone tries to shave you with a razor before surgery. Ask why you need to be shaved and talk with your surgeon if you have any concerns.  · Ask if you will get antibiotics before surgery.  After your surgery:  · Make sure that your healthcare providers clean their hands before examining you, either with soap and water or an alcohol-based hand rub.  · If you do not see your providers clean their hands, please ask them to do so.  · Family and friends who visit you should not touch the surgical wound or dressings.  · Family and friends should clean their hands with soap and water or an alcohol-based hand rub before and after visiting you. If you do not see them clean their hands, ask them to clean their hands.  What do I need to do when I go home from the hospital?  · Before you go home, your doctor or nurse should explain everything you need to know about taking care of your wound. Make sure you understand how to care for your wound before you leave the hospital.  · Always clean your hands before and after caring for your wound.  · Before you go home, make sure you know who to contact if you have questions or problems after you get home.  · If you have any symptoms of an infection, such as redness and pain at the surgery site, drainage, or fever, call your doctor immediately.  If you have additional questions, please ask your doctor or nurse.  Developed and co-sponsored by The Society for Healthcare Epidemiology of [place] (SHEA); Infectious Diseases Society of [place] (IDSA); American Hospital Association; Association for Professionals in Infection  Control and Epidemiology (APIC); Centers for Disease Control and Prevention (CDC); and The Joint Commission.     This information is not intended to replace advice given to you by your health care provider. Make sure you discuss any questions you have with your health care provider.     Document Released: 12/23/2014 Document Revised: 01/08/2016 Document Reviewed: 03/02/2016  [employer] Interactive Patient Education ©2016 [employer]       [facility]  CHG 4% Patient Instruction Sheet    Preparing the Skin Before Surgery  Preparing or “prepping” skin before surgery can reduce the risk of infection at the surgical site. To make the process easier,[facility] has chosen 4% Chlorhexidine Gluconate (CHG) antiseptic solution.   The steps below outline the prepping process and should be carefully followed.                                                                                                                                                      Prep the skin at the following time(s):                                                      We recommend you shower the night before surgery, and again the morning of surgery with the 4% CHG antiseptic solution using half of the bottle and a cloth each time.  Dress in clean clothes/sleepwear after showering.  See instructions below for application.          Do not apply any lotions or moisturizers.       Do not shave the area to be prepped for at least 2 days prior to surgery.    Clipping the hair may be done immediately prior to your surgery at the hospital    if needed.    Directions:  Thoroughly rinse your body with water.  Apply 4% CHG to a cloth and wash skin gently, paying special attention to the operative site.  Rinse again thoroughly.  Once you have begun using this product do not apply anything else to your skin. If itching or redness persists, rinse affected areas and discontinue use.    When using this product:  • Keep out of eyes, ears, and mouth.  • If  solution should contact these areas, rinse out promptly and thoroughly with water.  • For external use only.  • Do not use in genital area, on your face or head.      PATIENT/FAMILY/RESPONSIBLE PARTY VERBALIZES UNDERSTANDING OF ABOVE EDUCATION.  COPY OF PAIN SCALE GIVEN AND REVIEWED WITH VERBALIZED UNDERSTANDING.

## 2019-04-22 ENCOUNTER — OFFICE VISIT (OUTPATIENT)
Dept: FAMILY MEDICINE CLINIC | Facility: CLINIC | Age: 69
End: 2019-04-22

## 2019-04-22 VITALS
WEIGHT: 168.6 LBS | OXYGEN SATURATION: 99 % | HEART RATE: 79 BPM | SYSTOLIC BLOOD PRESSURE: 128 MMHG | BODY MASS INDEX: 28.09 KG/M2 | TEMPERATURE: 98.9 F | DIASTOLIC BLOOD PRESSURE: 82 MMHG | HEIGHT: 65 IN

## 2019-04-22 DIAGNOSIS — Z12.39 SCREENING FOR MALIGNANT NEOPLASM OF BREAST: ICD-10-CM

## 2019-04-22 DIAGNOSIS — I10 ESSENTIAL HYPERTENSION: ICD-10-CM

## 2019-04-22 DIAGNOSIS — E55.9 VITAMIN D DEFICIENCY: ICD-10-CM

## 2019-04-22 DIAGNOSIS — Z12.12 SCREENING FOR COLORECTAL CANCER: ICD-10-CM

## 2019-04-22 DIAGNOSIS — Z12.11 SCREENING FOR COLORECTAL CANCER: ICD-10-CM

## 2019-04-22 DIAGNOSIS — E78.2 MIXED HYPERLIPIDEMIA: ICD-10-CM

## 2019-04-22 DIAGNOSIS — Z00.00 ANNUAL PHYSICAL EXAM: ICD-10-CM

## 2019-04-22 DIAGNOSIS — R53.83 FATIGUE, UNSPECIFIED TYPE: Primary | ICD-10-CM

## 2019-04-22 PROCEDURE — 81003 URINALYSIS AUTO W/O SCOPE: CPT | Performed by: FAMILY MEDICINE

## 2019-04-22 PROCEDURE — G0439 PPPS, SUBSEQ VISIT: HCPCS | Performed by: FAMILY MEDICINE

## 2019-04-22 NOTE — PROGRESS NOTES
QUICK REFERENCE INFORMATION:  The ABCs of the Annual Wellness Visit    Subsequent Medicare Wellness Visit     HEALTH RISK ASSESSMENT    : 1950    Recent Hospitalizations:  No hospitalization(s) within the last year..  ccc      Current Medical Providers:  Patient Care Team:  Juan Luis Zafar MD as PCP - General (Family Medicine)  Emeka Carranza MD as Consulting Physician (Cardiology)  Krunal Garner OD (Optometry)  Marie Malone (Internal Medicine)        Smoking Status:  Social History     Tobacco Use   Smoking Status Never Smoker   Smokeless Tobacco Never Used       Alcohol Consumption:  Social History     Substance and Sexual Activity   Alcohol Use Yes    Comment: occ       Depression Screen:   PHQ-2/PHQ-9 Depression Screening 3/28/2019   Little interest or pleasure in doing things 0   Feeling down, depressed, or hopeless 0   Trouble falling or staying asleep, or sleeping too much -   Feeling tired or having little energy -   Poor appetite or overeating -   Feeling bad about yourself - or that you are a failure or have let yourself or your family down -   Trouble concentrating on things, such as reading the newspaper or watching television -   Moving or speaking so slowly that other people could have noticed. Or the opposite - being so fidgety or restless that you have been moving around a lot more than usual -   Thoughts that you would be better off dead, or of hurting yourself in some way -   Total Score 0   If you checked off any problems, how difficult have these problems made it for you to do your work, take care of things at home, or get along with other people? -       Health Habits and Functional and Cognitive Screening:  Functional & Cognitive Status 2019   Do you have difficulty preparing food and eating? No   Do you have difficulty bathing yourself, getting dressed or grooming yourself? No   Do you have difficulty using the toilet? No   Do you have difficulty moving around  from place to place? No   Do you have trouble with steps or getting out of a bed or a chair? No   In the past year have you fallen or experienced a near fall? Yes   Current Diet Low Carb Diet   Dental Exam Up to date   Eye Exam Up to date   Exercise (times per week) 5 times per week   Current Exercise Activities Include Walking   Do you need help using the phone?  No   Are you deaf or do you have serious difficulty hearing?  No   Do you need help with transportation? No   Do you need help shopping? No   Do you need help preparing meals?  No   Do you need help with housework?  No   Do you need help with laundry? No   Do you need help taking your medications? No   Do you need help managing money? No   Do you ever drive or ride in a car without wearing a seat belt? No   Have you felt unusual stress, anger or loneliness in the last month? No   Who do you live with? Spouse   If you need help, do you have trouble finding someone available to you? No   Have you been bothered in the last four weeks by sexual problems? No   Do you have difficulty concentrating, remembering or making decisions? No           Does the patient have evidence of cognitive impairment? No    Asiprin use counseling: Does not need ASA (and currently is not on it)      Recent Lab Results:    Lab Results   Component Value Date     (H) 01/09/2019     Lab Results   Component Value Date    HGBA1C 5.40 02/13/2017     Lab Results   Component Value Date    TRIG 128 10/17/2018    HDL 41 (L) 10/17/2018    VLDL 25.6 10/17/2018           Age-appropriate Screening Schedule:  Refer to the list below for future screening recommendations based on patient's age, sex and/or medical conditions. Orders for these recommended tests are listed in the plan section. The patient has been provided with a written plan.    Health Maintenance   Topic Date Due   • ZOSTER VACCINE (1 of 2) 04/30/2019 (Originally 1/10/2002)   • INFLUENZA VACCINE  08/01/2019   • LIPID PANEL   10/17/2019   • MAMMOGRAM  04/24/2020   • TDAP/TD VACCINES (2 - Td) 07/16/2023   • COLONOSCOPY  10/04/2026   • PNEUMOCOCCAL VACCINES (65+ LOW/MEDIUM RISK)  Discontinued        Subjective   History of Present Illness    Naina Manning is a 69 y.o. female who presents for an Annual Wellness Visit.    The following portions of the patient's history were reviewed and updated as appropriate: allergies, current medications, past family history, past medical history, past social history, past surgical history and problem list.    Outpatient Medications Prior to Visit   Medication Sig Dispense Refill   • acetaminophen (TYLENOL) 500 MG tablet Take 1,000 mg by mouth 2 (Two) Times a Day.     • albuterol sulfate HFA (VENTOLIN HFA) 108 (90 Base) MCG/ACT inhaler Inhale 2 puffs Every 4 (Four) Hours As Needed for Wheezing. 1 inhaler 6   • amLODIPine (NORVASC) 10 MG tablet TAKE 1 TABLET DAILY 90 tablet 0   • cholecalciferol (VITAMIN D3) 1000 units tablet Take 1,000 Units by mouth Daily.     • CloNIDine (CATAPRES) 0.1 MG tablet TAKE 1 TABLET EVERY NIGHT 90 tablet 0   • CloNIDine (CATAPRES) 0.2 MG tablet Take 1 tablet by mouth Every Morning. 90 tablet 3   • estradiol (ESTRACE VAGINAL) 0.1 MG/GM vaginal cream Insert 2 g into the vagina Daily. 42.5 g 12   • fluticasone (FLONASE) 50 MCG/ACT nasal spray USE 1 SPRAY IN EACH NOSTRIL TWICE A DAY 16 g 3   • indapamide (LOZOL) 2.5 MG tablet TAKE 1 TABLET DAILY 90 tablet 0   • levothyroxine (SYNTHROID, LEVOTHROID) 50 MCG tablet Take 1 tablet by mouth Every Morning. 90 tablet 0   • losartan (COZAAR) 100 MG tablet TAKE 1 TABLET EVERY MORNING 90 tablet 0   • omeprazole (priLOSEC) 40 MG capsule TAKE 1 CAPSULE DAILY 90 capsule 0   • pravastatin (PRAVACHOL) 40 MG tablet TAKE 1 TABLET EVERY NIGHT 90 tablet 1   • Multiple Vitamins-Minerals (MULTIVITAMIN ADULTS 50+ PO) Take  by mouth.       No facility-administered medications prior to visit.        Patient Active Problem List   Diagnosis   • Body mass  index (BMI) of 25.0 to 29.9   • Essential hypertension   • Hypertension   • Hypothyroid   • Hypothyroidism   • Seasonal allergic rhinitis   • Mass of right ovary       Advance Care Planning:  Patient does not have an advance directive - information provided to the patient today    Identification of Risk Factors:  Risk factors include: weight , cardiovascular risk and inactivity.    Review of Systems   Respiratory:        Goes to [facility] for pulmonology-- evaluation on low-dose CTs and pulmonary nodules   Cardiovascular:        Sees cardiologist yearly   Genitourinary:        Can you ready for bilateral oophorectomy   All other systems reviewed and are negative.      Compared to one year ago, the patient feels her physical health is better.  Compared to one year ago, the patient feels her mental health is better.    Objective     Physical Exam   Constitutional: She is oriented to person, place, and time. She appears well-developed and well-nourished.   Slightly overweight   HENT:   Right Ear: External ear normal.   Left Ear: External ear normal.   Mouth/Throat: Oropharynx is clear and moist.   Eyes: EOM are normal. Pupils are equal, round, and reactive to light.   Neck: No thyromegaly present.   Good carotid pulses   Cardiovascular: Normal rate and regular rhythm.   Pulmonary/Chest: Effort normal and breath sounds normal.   Breast no masses noted   Abdominal: Soft. Bowel sounds are normal. She exhibits no mass. There is no guarding.   Genitourinary:   Genitourinary Comments: Surgery schedule gynecologic -- Wednesday-GYN evaluated   Musculoskeletal: She exhibits no edema.   Lymphadenopathy:     She has no cervical adenopathy.   Neurological: She is alert and oriented to person, place, and time.   Skin: Skin is warm and dry. Capillary refill takes less than 2 seconds.   Psychiatric: She has a normal mood and affect. Her behavior is normal. Judgment and thought content normal.   Nursing note and vitals  "reviewed.      Vitals:    04/22/19 0757   BP: 128/82   BP Location: Left arm   Patient Position: Sitting   Cuff Size: Adult   Pulse: 79   Temp: 98.9 °F (37.2 °C)   TempSrc: Oral   SpO2: 99%   Weight: 76.5 kg (168 lb 9.6 oz)   Height: 165.1 cm (65\")   PainSc: 0-No pain       Patient's Body mass index is 28.06 kg/m². BMI is above normal parameters. Recommendations include: educational material and exercise counseling.      Assessment/Plan   Patient Self-Management and Personalized Health Advice  The patient has been provided with information about: diet, exercise, weight management and fall prevention and preventive services including:   · Colorectal cancer screening, cologuard test ordered, Fall Risk plan of care done, Screening mammography, referral placed.    Visit Diagnoses:    ICD-10-CM ICD-9-CM   1. Fatigue, unspecified type R53.83 780.79   2. Mixed hyperlipidemia E78.2 272.2   3. Vitamin D deficiency E55.9 268.9   4. Essential hypertension I10 401.9   5. Screening for colorectal cancer Z12.11 V76.51    Z12.12    6. Screening for malignant neoplasm of breast Z12.31 V76.10   7. Annual physical exam Z00.00 V70.0       Orders Placed This Encounter   Procedures   • Mammo Screening Bilateral With CAD     Standing Status:   Future     Standing Expiration Date:   4/22/2020     Order Specific Question:   Reason for Exam:     Answer:   screening   • TSH   • T4, free   • Cologuard - Stool, Per Rectum     Standing Status:   Future     Standing Expiration Date:   4/22/2020   • Comprehensive Metabolic Panel   • Lipid Panel   • Vitamin D 25 Hydroxy   • POC Urinalysis Dipstick, Multipro   • CBC & Differential     Order Specific Question:   Manual Differential     Answer:   No       Outpatient Encounter Medications as of 4/22/2019   Medication Sig Dispense Refill   • acetaminophen (TYLENOL) 500 MG tablet Take 1,000 mg by mouth 2 (Two) Times a Day.     • albuterol sulfate HFA (VENTOLIN HFA) 108 (90 Base) MCG/ACT inhaler Inhale 2 " puffs Every 4 (Four) Hours As Needed for Wheezing. 1 inhaler 6   • amLODIPine (NORVASC) 10 MG tablet TAKE 1 TABLET DAILY 90 tablet 0   • cholecalciferol (VITAMIN D3) 1000 units tablet Take 1,000 Units by mouth Daily.     • CloNIDine (CATAPRES) 0.1 MG tablet TAKE 1 TABLET EVERY NIGHT 90 tablet 0   • CloNIDine (CATAPRES) 0.2 MG tablet Take 1 tablet by mouth Every Morning. 90 tablet 3   • estradiol (ESTRACE VAGINAL) 0.1 MG/GM vaginal cream Insert 2 g into the vagina Daily. 42.5 g 12   • fluticasone (FLONASE) 50 MCG/ACT nasal spray USE 1 SPRAY IN EACH NOSTRIL TWICE A DAY 16 g 3   • indapamide (LOZOL) 2.5 MG tablet TAKE 1 TABLET DAILY 90 tablet 0   • levothyroxine (SYNTHROID, LEVOTHROID) 50 MCG tablet Take 1 tablet by mouth Every Morning. 90 tablet 0   • losartan (COZAAR) 100 MG tablet TAKE 1 TABLET EVERY MORNING 90 tablet 0   • omeprazole (priLOSEC) 40 MG capsule TAKE 1 CAPSULE DAILY 90 capsule 0   • pravastatin (PRAVACHOL) 40 MG tablet TAKE 1 TABLET EVERY NIGHT 90 tablet 1   • [DISCONTINUED] Multiple Vitamins-Minerals (MULTIVITAMIN ADULTS 50+ PO) Take  by mouth.       No facility-administered encounter medications on file as of 4/22/2019.        Reviewed use of high risk medication in the elderly: yes  Reviewed for potential of harmful drug interactions in the elderly: yes    Follow Up:  Return in 6 months (on 10/22/2019).     An After Visit Summary and PPPS with all of these plans were given to the patient.         Plan okay for surgery on Wednesday-above-mammography schedule

## 2019-04-22 NOTE — PATIENT INSTRUCTIONS
Medicare Wellness  Personal Prevention Plan of Service     Date of Office Visit:  2019  Encounter Provider:  Juan Luis Zafar MD  Place of Service:  [facility]  Patient Name: Naina Manning  :  1950    As part of the Medicare Wellness portion of your visit today, we are providing you with this personalized preventive plan of services (PPPS). This plan is based upon recommendations of the [place] Preventive Services Task Force (USPSTF) and the Advisory Committee on Immunization Practices (ACIP).    This lists the preventive care services that should be considered, and provides dates of when you are due. Items listed as completed are up-to-date and do not require any further intervention.    Health Maintenance   Topic Date Due   • PNEUMOCOCCAL VACCINES (65+ LOW/MEDIUM RISK) (2 of 2 - PPSV23) 2018   • ZOSTER VACCINE (1 of 2) 2019 (Originally 1/10/2002)   • INFLUENZA VACCINE  2019   • LIPID PANEL  10/17/2019   • MEDICARE ANNUAL WELLNESS  2020   • MAMMOGRAM  2020   • TDAP/TD VACCINES (2 - Td) 2023   • COLONOSCOPY  10/04/2026   • HEPATITIS C SCREENING  Completed       Orders Placed This Encounter   Procedures   • Mammo Screening Bilateral With CAD     Standing Status:   Future     Standing Expiration Date:   2020     Order Specific Question:   Reason for Exam:     Answer:   screening   • TSH   • T4, free   • Cologuard - Stool, Per Rectum     Standing Status:   Future     Standing Expiration Date:   2020   • Comprehensive Metabolic Panel   • Lipid Panel   • Vitamin D 25 Hydroxy   • POC Urinalysis Dipstick, Multipro   • CBC & Differential     Order Specific Question:   Manual Differential     Answer:   No       No Follow-up on file.          Exercising to Stay Healthy  Exercising regularly is important. It has many health benefits, such as:  · Improving your overall fitness, flexibility, and endurance.  · Increasing  your bone density.  · Helping with weight control.  · Decreasing your body fat.  · Increasing your muscle strength.  · Reducing stress and tension.  · Improving your overall health.    In order to become healthy and stay healthy, it is recommended that you do moderate-intensity and vigorous-intensity exercise. You can tell that you are exercising at a moderate intensity if you have a higher heart rate and faster breathing, but you are still able to hold a conversation. You can tell that you are exercising at a vigorous intensity if you are breathing much harder and faster and cannot hold a conversation while exercising.  How often should I exercise?  Choose an activity that you enjoy and set realistic goals. Your health care provider can help you to make an activity plan that works for you. Exercise regularly as directed by your health care provider. This may include:  · Doing resistance training twice each week, such as:  ? Push-ups.  ? Sit-ups.  ? Lifting weights.  ? Using resistance bands.  · Doing a given intensity of exercise for a given amount of time. Choose from these options:  ? 150 minutes of moderate-intensity exercise every week.  ? 75 minutes of vigorous-intensity exercise every week.  ? A mix of moderate-intensity and vigorous-intensity exercise every week.    Children, pregnant women, people who are out of shape, people who are overweight, and older adults may need to consult a health care provider for individual recommendations. If you have any sort of medical condition, be sure to consult your health care provider before starting a new exercise program.  What are some exercise ideas?  Some moderate-intensity exercise ideas include:  · Walking at a rate of 1 mile in 15 minutes.  · Biking.  · Hiking.  · Golfing.  · Dancing.    Some vigorous-intensity exercise ideas include:  · Walking at a rate of at least 4.5 miles per hour.  · Jogging or running at a rate of 5 miles per hour.  · Biking at a rate of  at least 10 miles per hour.  · Lap swimming.  · Roller-skating or in-line skating.  · Cross-country skiing.  · Vigorous competitive sports, such as football, basketball, and soccer.  · Jumping rope.  · Aerobic dancing.    What are some everyday activities that can help me to get exercise?  · Yard work, such as:  ? Pushing a .  ? Raking and bagging leaves.  · Washing and waxing your car.  · Pushing a stroller.  · Shoveling snow.  · Gardening.  · Washing windows or floors.  How can I be more active in my day-to-day activities?  · Use the stairs instead of the elevator.  · Take a walk during your lunch break.  · If you drive, park your car farther away from work or school.  · If you take public transportation, get off one stop early and walk the rest of the way.  · Make all of your phone calls while standing up and walking around.  · Get up, stretch, and walk around every 30 minutes throughout the day.  What guidelines should I follow while exercising?  · Do not exercise so much that you hurt yourself, feel dizzy, or get very short of breath.  · Consult your health care provider before starting a new exercise program.  · Wear comfortable clothes and shoes with good support.  · Drink plenty of water while you exercise to prevent dehydration or heat stroke. Body water is lost during exercise and must be replaced.  · Work out until you breathe faster and your heart beats faster.  This information is not intended to replace advice given to you by your health care provider. Make sure you discuss any questions you have with your health care provider.  Document Released: 01/20/2012 Document Revised: 05/25/2017 Document Reviewed: 05/21/2015  [employer] Interactive Patient Education © 2018 [employer]      Fall Prevention in the Home, Adult  Falls can cause injuries and can affect people from all age groups. There are many simple things that you can do to make your home safe and to help prevent falls. Ask for help  when making these changes, if needed.  What actions can I take to prevent falls?  General instructions  · Use good lighting in all rooms. Replace any light bulbs that burn out.  · Turn on lights if it is dark. Use night-lights.  · Place frequently used items in easy-to-reach places. Lower the shelves around your home if necessary.  · Set up furniture so that there are clear paths around it. Avoid moving your furniture around.  · Remove throw rugs and other tripping hazards from the floor.  · Avoid walking on wet floors.  · Fix any uneven floor surfaces.  · Add color or contrast paint or tape to grab bars and handrails in your home. Place contrasting color strips on the first and last steps of stairways.  · When you use a stepladder, make sure that it is completely opened and that the sides are firmly locked. Have someone hold the ladder while you are using it. Do not climb a closed stepladder.  · Be aware of any and all pets.  What can I do in the bathroom?  · Keep the floor dry. Immediately clean up any water that spills onto the floor.  · Remove soap buildup in the tub or shower on a regular basis.  · Use non-skid mats or decals on the floor of the tub or shower.  · Attach bath mats securely with double-sided, non-slip rug tape.  · If you need to sit down while you are in the shower, use a plastic, non-slip stool.  · Install grab bars by the toilet and in the tub and shower. Do not use towel bars as grab bars.  What can I do in the bedroom?  · Make sure that a bedside light is easy to reach.  · Do not use oversized bedding that drapes onto the floor.  · Have a firm chair that has side arms to use for getting dressed.  What can I do in the kitchen?  · Clean up any spills right away.  · If you need to reach for something above you, use a sturdy step stool that has a grab bar.  · Keep electrical cables out of the way.  · Do not use floor polish or wax that makes floors slippery. If you must use wax, make sure that it  is non-skid floor wax.  What can I do in the stairways?  · Do not leave any items on the stairs.  · Make sure that you have a light switch at the top of the stairs and the bottom of the stairs. Have them installed if you do not have them.  · Make sure that there are handrails on both sides of the stairs. Fix handrails that are broken or loose. Make sure that handrails are as long as the stairways.  · Install non-slip stair treads on all stairs in your home.  · Avoid having throw rugs at the top or bottom of stairways, or secure the rugs with carpet tape to prevent them from moving.  · Choose a carpet design that does not hide the edge of steps on the stairway.  · Check any carpeting to make sure that it is firmly attached to the stairs. Fix any carpet that is loose or worn.  What can I do on the outside of my home?  · Use bright outdoor lighting.  · Regularly repair the edges of walkways and driveways and fix any cracks.  · Remove high doorway thresholds.  · Trim any shrubbery on the main path into your home.  · Regularly check that handrails are securely fastened and in good repair. Both sides of any steps should have handrails.  · Install guardrails along the edges of any raised decks or porches.  · Clear walkways of debris and clutter, including tools and rocks.  · Have leaves, snow, and ice cleared regularly.  · Use sand or salt on walkways during winter months.  · In the garage, clean up any spills right away, including grease or oil spills.  What other actions can I take?  · Wear closed-toe shoes that fit well and support your feet. Wear shoes that have rubber soles or low heels.  · Use mobility aids as needed, such as canes, walkers, scooters, and crutches.  · Review your medicines with your health care provider. Some medicines can cause dizziness or changes in blood pressure, which increase your risk of falling.  Talk with your health care provider about other ways that you can decrease your risk of falls.  This may include working with a physical therapist or  to improve your strength, balance, and endurance.  Where to find more information  · Centers for Disease Control and Prevention, [employer]: https://www.cdc.gov  · National [place] on Aging: https://eq7buxr.adán.nih.gov  Contact a health care provider if:  · You are afraid of falling at home.  · You feel weak, drowsy, or dizzy at home.  · You fall at home.  Summary  · There are many simple things that you can do to make your home safe and to help prevent falls.  · Ways to make your home safe include removing tripping hazards and installing grab bars in the bathroom.  · Ask for help when making these changes in your home.  This information is not intended to replace advice given to you by your health care provider. Make sure you discuss any questions you have with your health care provider.  Document Released: 12/08/2003 Document Revised: 08/02/2018 Document Reviewed: 08/02/2018  [employer] Interactive Patient Education © 2019 [employer]

## 2019-04-23 LAB
25(OH)D3+25(OH)D2 SERPL-MCNC: 42 NG/ML (ref 30–100)
ALBUMIN SERPL-MCNC: 4.7 G/DL (ref 3.5–5.2)
ALBUMIN/GLOB SERPL: 1.6 G/DL
ALP SERPL-CCNC: 73 U/L (ref 39–117)
ALT SERPL-CCNC: 12 U/L (ref 1–33)
AST SERPL-CCNC: 16 U/L (ref 1–32)
BASOPHILS # BLD AUTO: 0.06 10*3/MM3 (ref 0–0.2)
BASOPHILS NFR BLD AUTO: 0.8 % (ref 0–1.5)
BILIRUB SERPL-MCNC: 0.3 MG/DL (ref 0.2–1.2)
BUN SERPL-MCNC: 10 MG/DL (ref 8–23)
BUN/CREAT SERPL: 14.5 (ref 7–25)
CALCIUM SERPL-MCNC: 9.3 MG/DL (ref 8.6–10.5)
CHLORIDE SERPL-SCNC: 94 MMOL/L (ref 98–107)
CHOLEST SERPL-MCNC: 135 MG/DL (ref 0–200)
CO2 SERPL-SCNC: 28.2 MMOL/L (ref 22–29)
CREAT SERPL-MCNC: 0.69 MG/DL (ref 0.57–1)
EOSINOPHIL # BLD AUTO: 0.13 10*3/MM3 (ref 0–0.4)
EOSINOPHIL NFR BLD AUTO: 1.8 % (ref 0.3–6.2)
ERYTHROCYTE [DISTWIDTH] IN BLOOD BY AUTOMATED COUNT: 14.3 % (ref 12.3–15.4)
GLOBULIN SER CALC-MCNC: 2.9 GM/DL
GLUCOSE SERPL-MCNC: 99 MG/DL (ref 65–99)
HCT VFR BLD AUTO: 46.7 % (ref 34–46.6)
HDLC SERPL-MCNC: 47 MG/DL (ref 40–60)
HGB BLD-MCNC: 14.2 G/DL (ref 12–15.9)
IMM GRANULOCYTES # BLD AUTO: 0.01 10*3/MM3 (ref 0–0.05)
IMM GRANULOCYTES NFR BLD AUTO: 0.1 % (ref 0–0.5)
LDLC SERPL CALC-MCNC: 71 MG/DL (ref 0–100)
LYMPHOCYTES # BLD AUTO: 2 10*3/MM3 (ref 0.7–3.1)
LYMPHOCYTES NFR BLD AUTO: 27.9 % (ref 19.6–45.3)
MCH RBC QN AUTO: 28.9 PG (ref 26.6–33)
MCHC RBC AUTO-ENTMCNC: 30.4 G/DL (ref 31.5–35.7)
MCV RBC AUTO: 95.1 FL (ref 79–97)
MONOCYTES # BLD AUTO: 0.52 10*3/MM3 (ref 0.1–0.9)
MONOCYTES NFR BLD AUTO: 7.2 % (ref 5–12)
NEUTROPHILS # BLD AUTO: 4.46 10*3/MM3 (ref 1.7–7)
NEUTROPHILS NFR BLD AUTO: 62.2 % (ref 42.7–76)
NRBC BLD AUTO-RTO: 0 /100 WBC (ref 0–0.2)
PLATELET # BLD AUTO: 422 10*3/MM3 (ref 140–450)
POTASSIUM SERPL-SCNC: 4.6 MMOL/L (ref 3.5–5.2)
PROT SERPL-MCNC: 7.6 G/DL (ref 6–8.5)
RBC # BLD AUTO: 4.91 10*6/MM3 (ref 3.77–5.28)
SODIUM SERPL-SCNC: 137 MMOL/L (ref 136–145)
T4 FREE SERPL-MCNC: 1.32 NG/DL (ref 0.93–1.7)
TRIGL SERPL-MCNC: 86 MG/DL (ref 0–150)
TSH SERPL DL<=0.005 MIU/L-ACNC: 1.39 MIU/ML (ref 0.27–4.2)
VLDLC SERPL CALC-MCNC: 17.2 MG/DL (ref 5–40)
WBC # BLD AUTO: 7.18 10*3/MM3 (ref 3.4–10.8)

## 2019-04-24 ENCOUNTER — ANESTHESIA (OUTPATIENT)
Dept: PERIOP | Facility: HOSPITAL | Age: 69
End: 2019-04-24

## 2019-04-24 ENCOUNTER — HOSPITAL ENCOUNTER (OUTPATIENT)
Facility: HOSPITAL | Age: 69
Setting detail: HOSPITAL OUTPATIENT SURGERY
Discharge: HOME | End: 2019-04-24
Attending: OBSTETRICS & GYNECOLOGY | Admitting: OBSTETRICS & GYNECOLOGY

## 2019-04-24 ENCOUNTER — ANESTHESIA EVENT (OUTPATIENT)
Dept: PERIOP | Facility: HOSPITAL | Age: 69
End: 2019-04-24

## 2019-04-24 VITALS
DIASTOLIC BLOOD PRESSURE: 56 MMHG | TEMPERATURE: 97.7 F | SYSTOLIC BLOOD PRESSURE: 103 MMHG | RESPIRATION RATE: 16 BRPM | HEART RATE: 70 BPM | OXYGEN SATURATION: 99 %

## 2019-04-24 DIAGNOSIS — N83.8 MASS OF RIGHT OVARY: ICD-10-CM

## 2019-04-24 PROCEDURE — 25010000002 MORPHINE PER 10 MG: Performed by: NURSE ANESTHETIST, CERTIFIED REGISTERED

## 2019-04-24 PROCEDURE — 25010000002 MIDAZOLAM PER 1 MG: Performed by: ANESTHESIOLOGY

## 2019-04-24 PROCEDURE — 25010000002 ONDANSETRON PER 1 MG: Performed by: NURSE ANESTHETIST, CERTIFIED REGISTERED

## 2019-04-24 PROCEDURE — 25010000002 PROPOFOL 10 MG/ML EMULSION: Performed by: NURSE ANESTHETIST, CERTIFIED REGISTERED

## 2019-04-24 PROCEDURE — 25010000002 FENTANYL CITRATE (PF) 100 MCG/2ML SOLUTION: Performed by: NURSE ANESTHETIST, CERTIFIED REGISTERED

## 2019-04-24 PROCEDURE — 25010000002 NEOSTIGMINE PER 0.5 MG: Performed by: NURSE ANESTHETIST, CERTIFIED REGISTERED

## 2019-04-24 PROCEDURE — 88305 TISSUE EXAM BY PATHOLOGIST: CPT | Performed by: OBSTETRICS & GYNECOLOGY

## 2019-04-24 PROCEDURE — 58661 LAPAROSCOPY REMOVE ADNEXA: CPT | Performed by: OBSTETRICS & GYNECOLOGY

## 2019-04-24 PROCEDURE — 25010000002 DEXAMETHASONE PER 1 MG: Performed by: ANESTHESIOLOGY

## 2019-04-24 RX ORDER — HYDROCODONE BITARTRATE AND ACETAMINOPHEN 5; 325 MG/1; MG/1
1 TABLET ORAL ONCE AS NEEDED
Status: DISCONTINUED | OUTPATIENT
Start: 2019-04-24 | End: 2019-04-24 | Stop reason: HOSPADM

## 2019-04-24 RX ORDER — IPRATROPIUM BROMIDE AND ALBUTEROL SULFATE 2.5; .5 MG/3ML; MG/3ML
3 SOLUTION RESPIRATORY (INHALATION) ONCE AS NEEDED
Status: DISCONTINUED | OUTPATIENT
Start: 2019-04-24 | End: 2019-04-24 | Stop reason: HOSPADM

## 2019-04-24 RX ORDER — MAGNESIUM HYDROXIDE 1200 MG/15ML
LIQUID ORAL AS NEEDED
Status: DISCONTINUED | OUTPATIENT
Start: 2019-04-24 | End: 2019-04-24 | Stop reason: HOSPADM

## 2019-04-24 RX ORDER — ACETAMINOPHEN 500 MG
1000 TABLET ORAL ONCE
Status: COMPLETED | OUTPATIENT
Start: 2019-04-24 | End: 2019-04-24

## 2019-04-24 RX ORDER — MIDAZOLAM HYDROCHLORIDE 1 MG/ML
1 INJECTION INTRAMUSCULAR; INTRAVENOUS
Status: DISCONTINUED | OUTPATIENT
Start: 2019-04-24 | End: 2019-04-24 | Stop reason: HOSPADM

## 2019-04-24 RX ORDER — ONDANSETRON 2 MG/ML
INJECTION INTRAMUSCULAR; INTRAVENOUS AS NEEDED
Status: DISCONTINUED | OUTPATIENT
Start: 2019-04-24 | End: 2019-04-24 | Stop reason: SURG

## 2019-04-24 RX ORDER — MORPHINE SULFATE 10 MG/ML
INJECTION INTRAMUSCULAR; INTRAVENOUS; SUBCUTANEOUS AS NEEDED
Status: DISCONTINUED | OUTPATIENT
Start: 2019-04-24 | End: 2019-04-24 | Stop reason: SURG

## 2019-04-24 RX ORDER — SODIUM CHLORIDE, SODIUM LACTATE, POTASSIUM CHLORIDE, CALCIUM CHLORIDE 600; 310; 30; 20 MG/100ML; MG/100ML; MG/100ML; MG/100ML
100 INJECTION, SOLUTION INTRAVENOUS CONTINUOUS
Status: DISCONTINUED | OUTPATIENT
Start: 2019-04-24 | End: 2019-04-24 | Stop reason: HOSPADM

## 2019-04-24 RX ORDER — ONDANSETRON 2 MG/ML
4 INJECTION INTRAMUSCULAR; INTRAVENOUS ONCE AS NEEDED
Status: DISCONTINUED | OUTPATIENT
Start: 2019-04-24 | End: 2019-04-24 | Stop reason: HOSPADM

## 2019-04-24 RX ORDER — ROCURONIUM BROMIDE 10 MG/ML
INJECTION, SOLUTION INTRAVENOUS AS NEEDED
Status: DISCONTINUED | OUTPATIENT
Start: 2019-04-24 | End: 2019-04-24 | Stop reason: SURG

## 2019-04-24 RX ORDER — SODIUM CHLORIDE 0.9 % (FLUSH) 0.9 %
3 SYRINGE (ML) INJECTION EVERY 12 HOURS SCHEDULED
Status: DISCONTINUED | OUTPATIENT
Start: 2019-04-24 | End: 2019-04-24 | Stop reason: HOSPADM

## 2019-04-24 RX ORDER — SODIUM CHLORIDE 0.9 % (FLUSH) 0.9 %
3 SYRINGE (ML) INJECTION AS NEEDED
Status: DISCONTINUED | OUTPATIENT
Start: 2019-04-24 | End: 2019-04-24 | Stop reason: HOSPADM

## 2019-04-24 RX ORDER — MEPERIDINE HYDROCHLORIDE 25 MG/ML
12.5 INJECTION INTRAMUSCULAR; INTRAVENOUS; SUBCUTANEOUS
Status: DISCONTINUED | OUTPATIENT
Start: 2019-04-24 | End: 2019-04-24 | Stop reason: HOSPADM

## 2019-04-24 RX ORDER — DEXAMETHASONE SODIUM PHOSPHATE 4 MG/ML
4 INJECTION, SOLUTION INTRA-ARTICULAR; INTRALESIONAL; INTRAMUSCULAR; INTRAVENOUS; SOFT TISSUE ONCE AS NEEDED
Status: COMPLETED | OUTPATIENT
Start: 2019-04-24 | End: 2019-04-24

## 2019-04-24 RX ORDER — HYDROCODONE BITARTRATE AND ACETAMINOPHEN 5; 325 MG/1; MG/1
1 TABLET ORAL EVERY 4 HOURS PRN
Qty: 8 TABLET | Refills: 0 | Status: SHIPPED | OUTPATIENT
Start: 2019-04-24 | End: 2019-05-07

## 2019-04-24 RX ORDER — OXYCODONE AND ACETAMINOPHEN 10; 325 MG/1; MG/1
1 TABLET ORAL ONCE AS NEEDED
Status: COMPLETED | OUTPATIENT
Start: 2019-04-24 | End: 2019-04-24

## 2019-04-24 RX ORDER — SODIUM CHLORIDE, SODIUM LACTATE, POTASSIUM CHLORIDE, CALCIUM CHLORIDE 600; 310; 30; 20 MG/100ML; MG/100ML; MG/100ML; MG/100ML
1000 INJECTION, SOLUTION INTRAVENOUS CONTINUOUS
Status: DISCONTINUED | OUTPATIENT
Start: 2019-04-24 | End: 2019-04-24 | Stop reason: HOSPADM

## 2019-04-24 RX ORDER — MIDAZOLAM HYDROCHLORIDE 1 MG/ML
2 INJECTION INTRAMUSCULAR; INTRAVENOUS
Status: DISCONTINUED | OUTPATIENT
Start: 2019-04-24 | End: 2019-04-24 | Stop reason: HOSPADM

## 2019-04-24 RX ORDER — METOCLOPRAMIDE HYDROCHLORIDE 5 MG/ML
5 INJECTION INTRAMUSCULAR; INTRAVENOUS
Status: DISCONTINUED | OUTPATIENT
Start: 2019-04-24 | End: 2019-04-24 | Stop reason: HOSPADM

## 2019-04-24 RX ORDER — OXYCODONE AND ACETAMINOPHEN 7.5; 325 MG/1; MG/1
2 TABLET ORAL EVERY 4 HOURS PRN
Status: DISCONTINUED | OUTPATIENT
Start: 2019-04-24 | End: 2019-04-24 | Stop reason: HOSPADM

## 2019-04-24 RX ORDER — FENTANYL CITRATE 50 UG/ML
INJECTION, SOLUTION INTRAMUSCULAR; INTRAVENOUS AS NEEDED
Status: DISCONTINUED | OUTPATIENT
Start: 2019-04-24 | End: 2019-04-24 | Stop reason: SURG

## 2019-04-24 RX ORDER — PROPOFOL 10 MG/ML
VIAL (ML) INTRAVENOUS AS NEEDED
Status: DISCONTINUED | OUTPATIENT
Start: 2019-04-24 | End: 2019-04-24 | Stop reason: SURG

## 2019-04-24 RX ORDER — LIDOCAINE HYDROCHLORIDE 20 MG/ML
INJECTION, SOLUTION INFILTRATION; PERINEURAL AS NEEDED
Status: DISCONTINUED | OUTPATIENT
Start: 2019-04-24 | End: 2019-04-24 | Stop reason: SURG

## 2019-04-24 RX ORDER — BUPIVACAINE HYDROCHLORIDE 2.5 MG/ML
INJECTION, SOLUTION INFILTRATION; PERINEURAL AS NEEDED
Status: DISCONTINUED | OUTPATIENT
Start: 2019-04-24 | End: 2019-04-24 | Stop reason: HOSPADM

## 2019-04-24 RX ORDER — IBUPROFEN 600 MG/1
600 TABLET ORAL ONCE AS NEEDED
Status: COMPLETED | OUTPATIENT
Start: 2019-04-24 | End: 2019-04-24

## 2019-04-24 RX ORDER — LABETALOL HYDROCHLORIDE 5 MG/ML
5 INJECTION, SOLUTION INTRAVENOUS
Status: DISCONTINUED | OUTPATIENT
Start: 2019-04-24 | End: 2019-04-24 | Stop reason: HOSPADM

## 2019-04-24 RX ORDER — NALOXONE HCL 0.4 MG/ML
0.4 VIAL (ML) INJECTION AS NEEDED
Status: DISCONTINUED | OUTPATIENT
Start: 2019-04-24 | End: 2019-04-24 | Stop reason: HOSPADM

## 2019-04-24 RX ORDER — SODIUM CHLORIDE 9 MG/ML
100 INJECTION, SOLUTION INTRAVENOUS CONTINUOUS
Status: DISCONTINUED | OUTPATIENT
Start: 2019-04-24 | End: 2019-04-24 | Stop reason: HOSPADM

## 2019-04-24 RX ORDER — SODIUM CHLORIDE 0.9 % (FLUSH) 0.9 %
1-10 SYRINGE (ML) INJECTION AS NEEDED
Status: DISCONTINUED | OUTPATIENT
Start: 2019-04-24 | End: 2019-04-24 | Stop reason: HOSPADM

## 2019-04-24 RX ORDER — GLYCOPYRROLATE 0.2 MG/ML
INJECTION INTRAMUSCULAR; INTRAVENOUS AS NEEDED
Status: DISCONTINUED | OUTPATIENT
Start: 2019-04-24 | End: 2019-04-24 | Stop reason: SURG

## 2019-04-24 RX ORDER — FENTANYL CITRATE 50 UG/ML
25 INJECTION, SOLUTION INTRAMUSCULAR; INTRAVENOUS AS NEEDED
Status: DISCONTINUED | OUTPATIENT
Start: 2019-04-24 | End: 2019-04-24 | Stop reason: HOSPADM

## 2019-04-24 RX ADMIN — ACETAMINOPHEN 1000 MG: 500 TABLET, FILM COATED ORAL at 07:10

## 2019-04-24 RX ADMIN — MORPHINE SULFATE 4 MG: 10 INJECTION, SOLUTION INTRAMUSCULAR; INTRAVENOUS at 08:24

## 2019-04-24 RX ADMIN — MORPHINE SULFATE 1 MG: 10 INJECTION, SOLUTION INTRAMUSCULAR; INTRAVENOUS at 08:27

## 2019-04-24 RX ADMIN — FENTANYL CITRATE 100 MCG: 50 INJECTION, SOLUTION INTRAMUSCULAR; INTRAVENOUS at 07:37

## 2019-04-24 RX ADMIN — IBUPROFEN 600 MG: 600 TABLET ORAL at 09:27

## 2019-04-24 RX ADMIN — SODIUM CHLORIDE, POTASSIUM CHLORIDE, SODIUM LACTATE AND CALCIUM CHLORIDE 1000 ML: 600; 310; 30; 20 INJECTION, SOLUTION INTRAVENOUS at 06:00

## 2019-04-24 RX ADMIN — ROCURONIUM BROMIDE 10 MG: 10 INJECTION INTRAVENOUS at 07:37

## 2019-04-24 RX ADMIN — DEXAMETHASONE SODIUM PHOSPHATE 4 MG: 4 INJECTION, SOLUTION INTRAMUSCULAR; INTRAVENOUS at 07:10

## 2019-04-24 RX ADMIN — ROCURONIUM BROMIDE 20 MG: 10 INJECTION INTRAVENOUS at 07:38

## 2019-04-24 RX ADMIN — Medication 3 MG: at 08:21

## 2019-04-24 RX ADMIN — EPHEDRINE SULFATE 15 MG: 50 INJECTION INTRAMUSCULAR; INTRAVENOUS; SUBCUTANEOUS at 07:52

## 2019-04-24 RX ADMIN — ONDANSETRON HYDROCHLORIDE 4 MG: 2 SOLUTION INTRAMUSCULAR; INTRAVENOUS at 07:51

## 2019-04-24 RX ADMIN — GLYCOPYRROLATE 0.4 MG: 0.2 INJECTION, SOLUTION INTRAMUSCULAR; INTRAVENOUS at 08:21

## 2019-04-24 RX ADMIN — EPHEDRINE SULFATE 15 MG: 50 INJECTION INTRAMUSCULAR; INTRAVENOUS; SUBCUTANEOUS at 07:49

## 2019-04-24 RX ADMIN — MIDAZOLAM HYDROCHLORIDE 2 MG: 1 INJECTION, SOLUTION INTRAMUSCULAR; INTRAVENOUS at 07:10

## 2019-04-24 RX ADMIN — LIDOCAINE HYDROCHLORIDE 80 MG: 20 INJECTION, SOLUTION INFILTRATION; PERINEURAL at 07:37

## 2019-04-24 RX ADMIN — OXYCODONE HYDROCHLORIDE AND ACETAMINOPHEN 1 TABLET: 10; 325 TABLET ORAL at 08:46

## 2019-04-24 RX ADMIN — SODIUM CHLORIDE, POTASSIUM CHLORIDE, SODIUM LACTATE AND CALCIUM CHLORIDE: 600; 310; 30; 20 INJECTION, SOLUTION INTRAVENOUS at 08:25

## 2019-04-24 RX ADMIN — PROPOFOL 120 MG: 10 INJECTION, EMULSION INTRAVENOUS at 07:37

## 2019-04-24 NOTE — OP NOTE
BH Gianluca Manning  : 1950  MRN: [id number]  [facility]: [id number]  Date: 2019    Operative Note    SALPINGO OOPHORECTOMY LAPAROSCOPIC      Pre-op Diagnosis:  Mass of right ovary [N83.9]   Post-op Diagnosis:  Post-Op Diagnosis Codes:     * Mass of right ovary [N83.9]   Procedure: Procedure(s):  Bilateral SALPINGO OOPHORECTOMY LAPAROSCOPIC   Surgeon: Surgeon(s):  Aashish Ty MD       Anesthesia: General     Estimated Blood Loss: minimal   Fluids: 1000   mls   UOP: 200   mls   Drains: none   ABx: none     Specimens:  Bilateral tubes and ovaries   Findings: Normal appearing tubes and ovaries, right sided fundal uterine fibroid   Complications: none     INDICATION: Naina Manning is a 69 y.o. female with elevated serum estrogen levels and an enlarged appearing right ovary on ultrasound.  PROCEDURE IN DETAIL: After informed consent was obtained, the patient was taken to the operating room, where general anesthesia was administered. She was placed in the lithotomy position in [facility], and her abdomen perineum and vagina were prepped and draped in normal sterile fashion. An open sided speculum was placed in the vagina and her bladder drained with a metal catheter. The anterior lip of the cervix was visualized and grasped with a single toothed tenaculum.  A Webb cannula was placed in the cervix and attached to the tenaculum.  The speculum was removed.  After changing gloves attention was turned to the abdomen.   The infraumbilical skin was infiltrated with 0.25% MARCAINE solution. A 2 cm horizontal incision was made just below the umbilicus with a scalpel.  The subcutaneous tissues were divided bluntly.  The abdominal wall was elevated and the Veress needle placed in to the peritoneal cavity on first attempt. Correct placement was confirmed by water drop test and measurement of gas pressure and flow. Opening pressure was 4 mmHg.  The abdomen was insufflated with carbon dioxide gas.  The  Veress needle was removed and a 11mm tissue  blunt disposable trocar was placed in to the abdomen while tenting the abdominal wall. The abdomen and pelvis were inspected with the findings noted above. There was no sign of injury from entry.  2 accessory ports were placed in the lower quadrants by first infiltrating the skin with local anesthetic and then making a 5 mm skin incision.  A 5 mm tissue  blunt disposable trocar was then placed into the peritoneal cavity under direct visualization with the laparoscope. The patient was placed in Trendelenburg position and the bowel swept out of the pelvis with a blunt probe.  Some sigmoid adhesions to the left pelvic brim were taken down using the LigaSure device.  The left fallopian tube was grasped and the IP ligament identified.  The ureter was well visualized well away from the IP ligament.  The LigaSure device was then used to cauterize and transect the left IP ligament.  The Ligasure device was then used to continue dissection along the broad ligament and across the left utero-ovarian ligament.  The left adnexa was then placed in the anterior cul-de-sac and the excision site was inspected with hemostasis noted.  A 5 mm camera was then placed through the left lower quadrant port and the adnexa removed through the umbilical port.  The right adnexa was grasped and elevated. The right ureter was identified. The right adnexa were then excised with the Ligasure device using identical technique. The right adnexa was removed through the umbilical port. The excision sites were inspected with hemostasis noted.  The accessory ports were removed under direct visualization with hemostasis noted.  The laparoscope was removed and the gas allowed to escape the abdomen through the umbilical port.  The umbilical port was then removed.  The skin incisions were then closed with a subcuticular stitch of 3-0 Vicryl Rapide and reinforced with a Steri-Strip.  The  instruments were then removed from the vagina and the tenaculum site was hemostatic.     The patient was then awakened and taken to the recovery room in stable condition.  She tolerated the procedure well and without complications.  All sponge needle and instrument counts were correct times 3 per the OR staff.    Aashish Ty MD   4/24/2019  8:33 AM

## 2019-04-24 NOTE — DISCHARGE INSTRUCTIONS

## 2019-04-24 NOTE — ANESTHESIA PROCEDURE NOTES
Airway  Urgency: elective      General Information and Staff    Patient location during procedure: OR  CRNA: Bella Baxter CRNA    Indications and Patient Condition  Indications for airway management: airway protection    Preoxygenated: yes  Mask difficulty assessment: 0 - not attempted    Final Airway Details  Final airway type: endotracheal airway      Successful airway: ETT  Cuffed: yes   Successful intubation technique: direct laryngoscopy  Facilitating devices/methods: intubating stylet and cricoid pressure  Endotracheal tube insertion site: oral  Blade: Julius  Blade size: 3.5.  ETT size (mm): 7.0  Cormack-Lehane Classification: grade IIb - view of arytenoids or posterior of glottis only  Placement verified by: chest auscultation and capnometry   Cuff volume (mL): 4  Measured from: lips  ETT to lips (cm): 22  Number of attempts at approach: 2    Additional Comments  First attempt with Mac 3- grade 3 view with cricoid pressure. Second attempt with Craig #3- grade 2b view with cricoid pressure- very anterior cords. Atraumatic. Dentition unchanged. .

## 2019-04-24 NOTE — ANESTHESIA POSTPROCEDURE EVALUATION
Patient: Naina Manning    Procedure Summary     Date:  04/24/19 Room / Location:  [facility] OR  /  [facility] OR    Anesthesia Start:  0734 Anesthesia Stop:  0840    Procedure:  Bilateral SALPINGO OOPHORECTOMY LAPAROSCOPIC (Bilateral Abdomen) Diagnosis:       Mass of right ovary      (Mass of right ovary [N83.9])    Surgeon:  Aashish Ty MD Provider:  Bella Baxter CRNA    Anesthesia Type:  general ASA Status:  2          Anesthesia Type: general  Last vitals  BP   127/63 (04/24/19 0915)   Temp   97.7 °F (36.5 °C) (04/24/19 0910)   Pulse   63 (04/24/19 0915)   Resp   16 (04/24/19 0915)     SpO2   97 % (04/24/19 0915)     Post Anesthesia Care and Evaluation    Patient location during evaluation: PACU  Patient participation: complete - patient participated  Level of consciousness: awake and alert  Pain management: adequate  Airway patency: patent  Anesthetic complications: No anesthetic complications  PONV Status: none  Cardiovascular status: acceptable and hemodynamically stable  Respiratory status: acceptable  Hydration status: acceptable    Comments: Blood pressure 127/63, pulse 63, temperature 97.7 °F (36.5 °C), temperature source Temporal, resp. rate 16, SpO2 97 %, not currently breastfeeding.    Patient discharged from PACU based upon Jonas score. Please see RN notes for further details

## 2019-04-24 NOTE — ANESTHESIA PREPROCEDURE EVALUATION
Anesthesia Evaluation     Patient summary reviewed   no history of anesthetic complications:  NPO Solid Status: > 8 hours  NPO Liquid Status: > 2 hours           Airway   Mallampati: I  TM distance: >3 FB  Neck ROM: full  No difficulty expected  Dental          Pulmonary    (+) sleep apnea,   (-) asthma, not a smoker  Cardiovascular   Exercise tolerance: good (4-7 METS)    (+) hypertension, dysrhythmias PAC, hyperlipidemia,       Neuro/Psych  (-) seizures, TIA, CVA  GI/Hepatic/Renal/Endo    (+)   hypothyroidism,   (-) liver disease, no renal disease, diabetes    Musculoskeletal     Abdominal    Substance History      OB/GYN      Comment: Post menopausal bleeding      Other   (+) arthritis                     Anesthesia Plan    ASA 2     general     intravenous induction   Anesthetic plan, all risks, benefits, and alternatives have been provided, discussed and informed consent has been obtained with: patient.

## 2019-04-25 LAB
CYTO UR: NORMAL
LAB AP CASE REPORT: NORMAL
PATH REPORT.FINAL DX SPEC: NORMAL
PATH REPORT.GROSS SPEC: NORMAL

## 2019-04-26 ENCOUNTER — TELEPHONE (OUTPATIENT)
Dept: OBSTETRICS AND GYNECOLOGY | Facility: CLINIC | Age: 69
End: 2019-04-26

## 2019-04-26 NOTE — TELEPHONE ENCOUNTER
It is normal to have some bleeding. We use an instrument on the cervix to move the uterus during the surgery. The bleeding should be light and only last a few days at most. Let me know if it's not getting better. Also, let her know that the surgical pathology report is benign.

## 2019-04-26 NOTE — TELEPHONE ENCOUNTER
Pt called. She had surgery the 24th of this month. Late yesterday afternoon she started bleeding vaginal bright red blood with clots. Its enough to wear a pad. She denies any pain or cramping. The bleeding continues today. Informed pt that I will notify Dr Ty.

## 2019-05-07 ENCOUNTER — OFFICE VISIT (OUTPATIENT)
Dept: OBSTETRICS AND GYNECOLOGY | Facility: CLINIC | Age: 69
End: 2019-05-07

## 2019-05-07 VITALS
WEIGHT: 164 LBS | DIASTOLIC BLOOD PRESSURE: 74 MMHG | SYSTOLIC BLOOD PRESSURE: 108 MMHG | HEIGHT: 65 IN | BODY MASS INDEX: 27.32 KG/M2

## 2019-05-07 DIAGNOSIS — Z09 POSTOP CHECK: ICD-10-CM

## 2019-05-07 DIAGNOSIS — N95.0 POSTMENOPAUSAL BLEEDING: Primary | ICD-10-CM

## 2019-05-07 PROBLEM — N83.8 MASS OF RIGHT OVARY: Status: RESOLVED | Noted: 2019-03-19 | Resolved: 2019-05-07

## 2019-05-07 PROCEDURE — 99213 OFFICE O/P EST LOW 20 MIN: CPT | Performed by: OBSTETRICS & GYNECOLOGY

## 2019-05-07 RX ORDER — CETIRIZINE HYDROCHLORIDE 10 MG/1
10 TABLET ORAL DAILY
COMMUNITY
End: 2022-06-14

## 2019-05-07 NOTE — PROGRESS NOTES
"Naina Manning is a 69 y.o. female here today for follow up of postmenopausal bleeding, elevated serum estradiol, as well as a postop visit after undergoing laparoscopic BSO on 4/24/19 for enlarged ovary and elevated serum estradiol.  She has been doing well postoperatively since her procedure and denies any fevers, problems with her incisions, or pain.  Her pathology report returned showing benign ovaries. She had some menstrual type vaginal bleeding for 3-4 days after surgery.    /74 (BP Location: Left arm, Patient Position: Sitting)   Ht 164 cm (64.57\")   Wt 74.4 kg (164 lb)   BMI 27.66 kg/m²    In general pleasant female in no acute distress  Abdomen is soft nontender nondistended  Her abdominal incisions are well-healed    Assessment: Postmenopausal bleeding, elevated serum estradiol, normal postoperative visit after laparoscopic BSO    Naina Manning will return to normal activities without restriction.  We have discussed the benign pathology report and she will decrease her Premarin cream dosing to 0.5g twice a week.  In the meantime if she has any vaginal bleeding, questions or problems she will call the office.   "

## 2019-06-03 RX ORDER — PRAVASTATIN SODIUM 40 MG
TABLET ORAL
Qty: 90 TABLET | Refills: 1 | Status: SHIPPED | OUTPATIENT
Start: 2019-06-03 | End: 2019-10-25

## 2019-06-03 RX ORDER — CLONIDINE HYDROCHLORIDE 0.2 MG/1
TABLET ORAL
Qty: 90 TABLET | Refills: 1 | Status: SHIPPED | OUTPATIENT
Start: 2019-06-03 | End: 2019-12-04 | Stop reason: SDUPTHER

## 2019-06-03 RX ORDER — INDAPAMIDE 2.5 MG/1
TABLET, FILM COATED ORAL
Qty: 90 TABLET | Refills: 1 | Status: SHIPPED | OUTPATIENT
Start: 2019-06-03 | End: 2019-12-04 | Stop reason: SDUPTHER

## 2019-06-03 RX ORDER — LOSARTAN POTASSIUM 100 MG/1
TABLET ORAL
Qty: 90 TABLET | Refills: 1 | Status: SHIPPED | OUTPATIENT
Start: 2019-06-03 | End: 2019-12-04 | Stop reason: SDUPTHER

## 2019-06-03 RX ORDER — AMLODIPINE BESYLATE 10 MG/1
TABLET ORAL
Qty: 90 TABLET | Refills: 1 | Status: SHIPPED | OUTPATIENT
Start: 2019-06-03 | End: 2019-12-04 | Stop reason: SDUPTHER

## 2019-06-03 RX ORDER — CLONIDINE HYDROCHLORIDE 0.1 MG/1
TABLET ORAL
Qty: 90 TABLET | Refills: 0 | Status: SHIPPED | OUTPATIENT
Start: 2019-06-03 | End: 2019-09-23 | Stop reason: SDUPTHER

## 2019-06-03 RX ORDER — OMEPRAZOLE 40 MG/1
CAPSULE, DELAYED RELEASE ORAL
Qty: 90 CAPSULE | Refills: 1 | Status: SHIPPED | OUTPATIENT
Start: 2019-06-03 | End: 2019-12-27

## 2019-06-03 RX ORDER — FLUTICASONE PROPIONATE 50 MCG
SPRAY, SUSPENSION (ML) NASAL
Qty: 16 G | Refills: 3 | Status: SHIPPED | OUTPATIENT
Start: 2019-06-03 | End: 2019-11-11 | Stop reason: SDUPTHER

## 2019-06-03 RX ORDER — LEVOTHYROXINE SODIUM 0.05 MG/1
TABLET ORAL
Qty: 90 TABLET | Refills: 1 | Status: SHIPPED | OUTPATIENT
Start: 2019-06-03 | End: 2019-12-04 | Stop reason: SDUPTHER

## 2019-06-18 ENCOUNTER — OFFICE VISIT (OUTPATIENT)
Dept: FAMILY MEDICINE CLINIC | Facility: CLINIC | Age: 69
End: 2019-06-18

## 2019-06-18 VITALS
OXYGEN SATURATION: 97 % | SYSTOLIC BLOOD PRESSURE: 126 MMHG | HEART RATE: 72 BPM | TEMPERATURE: 99.4 F | BODY MASS INDEX: 28.9 KG/M2 | WEIGHT: 171.4 LBS | DIASTOLIC BLOOD PRESSURE: 72 MMHG

## 2019-06-18 DIAGNOSIS — M25.551 PAIN OF BOTH HIP JOINTS: Primary | ICD-10-CM

## 2019-06-18 DIAGNOSIS — M25.552 PAIN OF BOTH HIP JOINTS: Primary | ICD-10-CM

## 2019-06-18 PROCEDURE — 99213 OFFICE O/P EST LOW 20 MIN: CPT | Performed by: FAMILY MEDICINE

## 2019-06-18 NOTE — PROGRESS NOTES
Subjective   Naina Manning is a 69 y.o. female.     69-year-old female complaining of pain in the right hip-history of hypertension hyperlipidemia      Lower Extremity Issue   This is a new problem. The current episode started more than 1 month ago. The problem occurs intermittently. The problem has been waxing and waning. Associated symptoms include arthralgias. Pertinent negatives include no chest pain. Associated symptoms comments: Right hip pain comes and goes sometime aggravated by walking. The symptoms are aggravated by walking. She has tried acetaminophen for the symptoms. The treatment provided mild relief.       The following portions of the patient's history were reviewed and updated as appropriate: allergies, current medications, past family history, past medical history, past social history, past surgical history and problem list.    Review of Systems   Cardiovascular: Negative for chest pain and leg swelling.   Musculoskeletal: Positive for arthralgias.        Right hip pain       Objective   Physical Exam   Constitutional: She is oriented to person, place, and time.   Musculoskeletal: She exhibits no tenderness.   Right hip pain-aggravated by external rotation   Neurological: She is alert and oriented to person, place, and time.   Psychiatric: She has a normal mood and affect. Her behavior is normal. Judgment and thought content normal.   Nursing note and vitals reviewed.      Assessment/Plan   Patient Active Problem List   Diagnosis   • Body mass index (BMI) of 25.0 to 29.9   • Essential hypertension   • Hypertension   • Hypothyroid   • Hypothyroidism   • Seasonal allergic rhinitis     Naina was seen today for hip pain.    Diagnoses and all orders for this visit:    Pain of both hip joints  -     XR Hips Bilateral With or Without Pelvis 2 View; Future       Return if symptoms worsen or fail to improve, for Next scheduled follow up.    Plan-above plus Tylenol

## 2019-09-23 RX ORDER — CLONIDINE HYDROCHLORIDE 0.1 MG/1
0.1 TABLET ORAL NIGHTLY
Qty: 90 TABLET | Refills: 1 | Status: SHIPPED | OUTPATIENT
Start: 2019-09-23 | End: 2020-03-24

## 2019-10-22 ENCOUNTER — OFFICE VISIT (OUTPATIENT)
Dept: FAMILY MEDICINE CLINIC | Facility: CLINIC | Age: 69
End: 2019-10-22

## 2019-10-22 VITALS
DIASTOLIC BLOOD PRESSURE: 81 MMHG | SYSTOLIC BLOOD PRESSURE: 124 MMHG | OXYGEN SATURATION: 98 % | TEMPERATURE: 98 F | BODY MASS INDEX: 28.32 KG/M2 | WEIGHT: 170 LBS | HEART RATE: 73 BPM | HEIGHT: 65 IN

## 2019-10-22 DIAGNOSIS — E78.2 MIXED HYPERLIPIDEMIA: Primary | ICD-10-CM

## 2019-10-22 DIAGNOSIS — Z23 NEED FOR IMMUNIZATION AGAINST INFLUENZA: ICD-10-CM

## 2019-10-22 PROCEDURE — 99213 OFFICE O/P EST LOW 20 MIN: CPT | Performed by: FAMILY MEDICINE

## 2019-10-22 PROCEDURE — 90674 CCIIV4 VAC NO PRSV 0.5 ML IM: CPT | Performed by: FAMILY MEDICINE

## 2019-10-22 PROCEDURE — G0008 ADMIN INFLUENZA VIRUS VAC: HCPCS | Performed by: FAMILY MEDICINE

## 2019-10-22 NOTE — PROGRESS NOTES
Subjective   Naina Manning is a 69 y.o. female.     69-year-old female with hyperlipidemia trochanteric bursitis and hypertension      Hyperlipidemia   This is a chronic problem. The current episode started more than 1 year ago. The problem is controlled. Recent lipid tests were reviewed and are normal. There are no known factors aggravating her hyperlipidemia. Pertinent negatives include no chest pain or myalgias. Current antihyperlipidemic treatment includes diet change and statins. The current treatment provides moderate improvement of lipids. Risk factors for coronary artery disease include dyslipidemia, hypertension, post-menopausal and family history.       The following portions of the patient's history were reviewed and updated as appropriate: allergies, current medications, past family history, past medical history, past social history, past surgical history and problem list.    Review of Systems   Cardiovascular: Negative for chest pain and leg swelling.   Musculoskeletal: Negative for myalgias.        Trochanteric bursitis right hip   Neurological: Negative for dizziness and headaches.       Objective   Physical Exam   Constitutional: She is oriented to person, place, and time.   Overweight   Cardiovascular: Normal rate and regular rhythm.   Pulmonary/Chest: Effort normal and breath sounds normal.   Musculoskeletal: She exhibits tenderness.   Point tenderness right hip-may need injection   Neurological: She is alert and oriented to person, place, and time.   Psychiatric: She has a normal mood and affect. Her behavior is normal. Judgment and thought content normal.   Nursing note and vitals reviewed.      Assessment/Plan   Patient Active Problem List   Diagnosis   • Body mass index (BMI) of 25.0 to 29.9   • Essential hypertension   • Hypertension   • Hypothyroid   • Hypothyroidism   • Seasonal allergic rhinitis     Naina was seen today for follow-up.    Diagnoses and all orders for this visit:    Mixed  hyperlipidemia  -     Comprehensive Metabolic Panel  -     Lipid Panel    Need for immunization against influenza  -     Flucelvax Quad=>4Years (PFS)       Return if symptoms worsen or fail to improve, for Annual physical.       Plan above-May need injection right hip      Electronically signed by Juan Luis Zafar MD 10/22/2019

## 2019-10-23 LAB
ALBUMIN SERPL-MCNC: 5 G/DL (ref 3.5–5.2)
ALBUMIN/GLOB SERPL: 1.9 G/DL
ALP SERPL-CCNC: 73 U/L (ref 39–117)
ALT SERPL-CCNC: 18 U/L (ref 1–33)
AST SERPL-CCNC: 17 U/L (ref 1–32)
BILIRUB SERPL-MCNC: 0.5 MG/DL (ref 0.2–1.2)
BUN SERPL-MCNC: 9 MG/DL (ref 8–23)
BUN/CREAT SERPL: 13 (ref 7–25)
CALCIUM SERPL-MCNC: 9.4 MG/DL (ref 8.6–10.5)
CHLORIDE SERPL-SCNC: 95 MMOL/L (ref 98–107)
CHOLEST SERPL-MCNC: 186 MG/DL (ref 0–200)
CO2 SERPL-SCNC: 28.6 MMOL/L (ref 22–29)
CREAT SERPL-MCNC: 0.69 MG/DL (ref 0.57–1)
GLOBULIN SER CALC-MCNC: 2.7 GM/DL
GLUCOSE SERPL-MCNC: 99 MG/DL (ref 65–99)
HDLC SERPL-MCNC: 44 MG/DL (ref 40–60)
LDLC SERPL CALC-MCNC: 114 MG/DL (ref 0–100)
POTASSIUM SERPL-SCNC: 4.3 MMOL/L (ref 3.5–5.2)
PROT SERPL-MCNC: 7.7 G/DL (ref 6–8.5)
SODIUM SERPL-SCNC: 138 MMOL/L (ref 136–145)
TRIGL SERPL-MCNC: 141 MG/DL (ref 0–150)
VLDLC SERPL CALC-MCNC: 28.2 MG/DL

## 2019-10-25 RX ORDER — ATORVASTATIN CALCIUM 20 MG/1
20 TABLET, FILM COATED ORAL NIGHTLY
COMMUNITY
End: 2020-03-17 | Stop reason: ALTCHOICE

## 2019-11-11 RX ORDER — FLUTICASONE PROPIONATE 50 MCG
2 SPRAY, SUSPENSION (ML) NASAL DAILY
Qty: 16 G | Refills: 3 | Status: SHIPPED | OUTPATIENT
Start: 2019-11-11 | End: 2020-03-24

## 2019-11-11 RX ORDER — FLUTICASONE PROPIONATE 50 MCG
SPRAY, SUSPENSION (ML) NASAL
Qty: 16 G | Refills: 12 | OUTPATIENT
Start: 2019-11-11

## 2019-11-14 ENCOUNTER — PROCEDURE VISIT (OUTPATIENT)
Dept: FAMILY MEDICINE CLINIC | Facility: CLINIC | Age: 69
End: 2019-11-14

## 2019-11-14 VITALS
BODY MASS INDEX: 29.02 KG/M2 | DIASTOLIC BLOOD PRESSURE: 87 MMHG | HEIGHT: 65 IN | SYSTOLIC BLOOD PRESSURE: 139 MMHG | OXYGEN SATURATION: 99 % | WEIGHT: 174.2 LBS | TEMPERATURE: 98.4 F | HEART RATE: 85 BPM

## 2019-11-14 DIAGNOSIS — M25.551 PAIN OF RIGHT HIP JOINT: Primary | ICD-10-CM

## 2019-11-14 DIAGNOSIS — M70.61 TROCHANTERIC BURSITIS OF RIGHT HIP: ICD-10-CM

## 2019-11-14 PROCEDURE — 20610 DRAIN/INJ JOINT/BURSA W/O US: CPT | Performed by: FAMILY MEDICINE

## 2019-11-14 RX ORDER — METHYLPREDNISOLONE ACETATE 80 MG/ML
160 INJECTION, SUSPENSION INTRA-ARTICULAR; INTRALESIONAL; INTRAMUSCULAR; SOFT TISSUE
Status: COMPLETED | OUTPATIENT
Start: 2019-11-14 | End: 2019-11-14

## 2019-11-14 RX ORDER — LIDOCAINE HYDROCHLORIDE 10 MG/ML
2 INJECTION, SOLUTION INFILTRATION; PERINEURAL
Status: COMPLETED | OUTPATIENT
Start: 2019-11-14 | End: 2019-11-14

## 2019-11-14 RX ADMIN — METHYLPREDNISOLONE ACETATE 160 MG: 80 INJECTION, SUSPENSION INTRA-ARTICULAR; INTRALESIONAL; INTRAMUSCULAR; SOFT TISSUE at 10:40

## 2019-11-14 RX ADMIN — LIDOCAINE HYDROCHLORIDE 2 ML: 10 INJECTION, SOLUTION INFILTRATION; PERINEURAL at 10:40

## 2019-11-14 NOTE — PROGRESS NOTES
Procedure   Arthrocentesis  Date/Time: 11/14/2019 10:40 AM  Performed by: Juan Luis Zafar MD  Authorized by: Juan Luis Zafar MD   Indications: pain   Body area: hip  Local anesthesia used: no    Anesthesia:  Local anesthesia used: no    Sedation:  Patient sedated: no    Preparation: Patient was prepped and draped in the usual sterile fashion.  Needle gauge: 22-gauge spinal needle.  Ultrasound guidance: no  Approach: lateral  Aspirate amount: 0 mL  Patient tolerance: Patient tolerated the procedure well with no immediate complications  Comments: Trochanteric bursitis right-injection as above with above-mentioned medications- no sequelae-noticed immediate improvement-trochanteric bursitis exercise and rehab program already administered the patient

## 2019-11-15 ENCOUNTER — TELEPHONE (OUTPATIENT)
Dept: CARDIOLOGY | Age: 69
End: 2019-11-15

## 2019-12-04 RX ORDER — AMLODIPINE BESYLATE 10 MG/1
10 TABLET ORAL DAILY
Qty: 90 TABLET | Refills: 1 | Status: SHIPPED | OUTPATIENT
Start: 2019-12-04 | End: 2020-06-23

## 2019-12-04 RX ORDER — LEVOTHYROXINE SODIUM 0.05 MG/1
50 TABLET ORAL EVERY MORNING
Qty: 90 TABLET | Refills: 1 | Status: SHIPPED | OUTPATIENT
Start: 2019-12-04 | End: 2020-06-23

## 2019-12-04 RX ORDER — INDAPAMIDE 2.5 MG/1
2.5 TABLET, FILM COATED ORAL DAILY
Qty: 90 TABLET | Refills: 1 | Status: SHIPPED | OUTPATIENT
Start: 2019-12-04 | End: 2020-06-23

## 2019-12-04 RX ORDER — LOSARTAN POTASSIUM 100 MG/1
100 TABLET ORAL EVERY MORNING
Qty: 90 TABLET | Refills: 1 | Status: SHIPPED | OUTPATIENT
Start: 2019-12-04 | End: 2020-06-23

## 2019-12-04 RX ORDER — CLONIDINE HYDROCHLORIDE 0.2 MG/1
0.2 TABLET ORAL EVERY MORNING
Qty: 90 TABLET | Refills: 1 | Status: SHIPPED | OUTPATIENT
Start: 2019-12-04 | End: 2020-06-23

## 2019-12-04 NOTE — TELEPHONE ENCOUNTER
MED REFILL REQUEST      INDAPAMIDE 2.5MG (1) QAM  LOSARTAN 100MG (1) QD  AMLODIPINE 10MG (1) QD  LEVOTHYROXINE 50MCG (1) QD  CLONIDINE 0.2MG (1) QD    [facility]

## 2019-12-23 ENCOUNTER — OFFICE VISIT (OUTPATIENT)
Dept: CARDIOLOGY | Age: 69
End: 2019-12-23
Payer: MEDICARE

## 2019-12-23 VITALS
WEIGHT: 173 LBS | HEART RATE: 78 BPM | SYSTOLIC BLOOD PRESSURE: 130 MMHG | DIASTOLIC BLOOD PRESSURE: 86 MMHG | HEIGHT: 65 IN | BODY MASS INDEX: 28.82 KG/M2

## 2019-12-23 DIAGNOSIS — E78.2 MIXED HYPERLIPIDEMIA: Primary | ICD-10-CM

## 2019-12-23 DIAGNOSIS — Z51.81 ENCOUNTER FOR MONITORING STATIN THERAPY: ICD-10-CM

## 2019-12-23 DIAGNOSIS — Z79.899 ENCOUNTER FOR MONITORING STATIN THERAPY: ICD-10-CM

## 2019-12-23 DIAGNOSIS — R07.9 RECURRENT CHEST PAIN: Primary | ICD-10-CM

## 2019-12-23 PROCEDURE — G8400 PT W/DXA NO RESULTS DOC: HCPCS | Performed by: INTERNAL MEDICINE

## 2019-12-23 PROCEDURE — 4040F PNEUMOC VAC/ADMIN/RCVD: CPT | Performed by: INTERNAL MEDICINE

## 2019-12-23 PROCEDURE — 1123F ACP DISCUSS/DSCN MKR DOCD: CPT | Performed by: INTERNAL MEDICINE

## 2019-12-23 PROCEDURE — 99212 OFFICE O/P EST SF 10 MIN: CPT | Performed by: INTERNAL MEDICINE

## 2019-12-23 PROCEDURE — 1036F TOBACCO NON-USER: CPT | Performed by: INTERNAL MEDICINE

## 2019-12-23 PROCEDURE — G8427 DOCREV CUR MEDS BY ELIG CLIN: HCPCS | Performed by: INTERNAL MEDICINE

## 2019-12-23 PROCEDURE — G8484 FLU IMMUNIZE NO ADMIN: HCPCS | Performed by: INTERNAL MEDICINE

## 2019-12-23 PROCEDURE — 3017F COLORECTAL CA SCREEN DOC REV: CPT | Performed by: INTERNAL MEDICINE

## 2019-12-23 PROCEDURE — 1090F PRES/ABSN URINE INCON ASSESS: CPT | Performed by: INTERNAL MEDICINE

## 2019-12-23 PROCEDURE — G8417 CALC BMI ABV UP PARAM F/U: HCPCS | Performed by: INTERNAL MEDICINE

## 2019-12-23 PROCEDURE — 93000 ELECTROCARDIOGRAM COMPLETE: CPT | Performed by: INTERNAL MEDICINE

## 2019-12-23 RX ORDER — PRAVASTATIN SODIUM 40 MG
40 TABLET ORAL NIGHTLY
COMMUNITY
End: 2019-12-23

## 2019-12-23 RX ORDER — ESTRADIOL 0.1 MG/G
2 CREAM VAGINAL DAILY
COMMUNITY

## 2019-12-23 RX ORDER — OMEPRAZOLE 40 MG/1
40 CAPSULE, DELAYED RELEASE ORAL DAILY
COMMUNITY

## 2019-12-23 RX ORDER — LOSARTAN POTASSIUM 100 MG/1
100 TABLET ORAL DAILY
COMMUNITY

## 2019-12-23 RX ORDER — FLUTICASONE PROPIONATE 50 MCG
1 SPRAY, SUSPENSION (ML) NASAL DAILY
COMMUNITY

## 2019-12-23 RX ORDER — PRAVASTATIN SODIUM 80 MG/1
80 TABLET ORAL NIGHTLY
Qty: 90 TABLET | Refills: 3 | Status: SHIPPED | OUTPATIENT
Start: 2019-12-23 | End: 2021-05-11 | Stop reason: ALTCHOICE

## 2019-12-23 RX ORDER — CLONIDINE HYDROCHLORIDE 0.1 MG/1
0.1 TABLET ORAL NIGHTLY
COMMUNITY

## 2019-12-23 RX ORDER — AMLODIPINE BESYLATE 10 MG/1
10 TABLET ORAL DAILY
COMMUNITY

## 2019-12-23 RX ORDER — LEVOTHYROXINE SODIUM 0.05 MG/1
50 TABLET ORAL DAILY
COMMUNITY

## 2019-12-23 RX ORDER — CLONIDINE HYDROCHLORIDE 0.2 MG/1
0.2 TABLET ORAL EVERY MORNING
COMMUNITY

## 2019-12-23 RX ORDER — INDAPAMIDE 2.5 MG/1
2.5 TABLET, FILM COATED ORAL EVERY MORNING
COMMUNITY

## 2019-12-23 SDOH — HEALTH STABILITY: MENTAL HEALTH: HOW OFTEN DO YOU HAVE A DRINK CONTAINING ALCOHOL?: NEVER

## 2019-12-26 RX ORDER — ESTRADIOL 0.1 MG/G
CREAM VAGINAL
Qty: 42.5 G | Refills: 5 | Status: SHIPPED | OUTPATIENT
Start: 2019-12-26 | End: 2020-09-23

## 2019-12-27 RX ORDER — OMEPRAZOLE 40 MG/1
CAPSULE, DELAYED RELEASE ORAL
Qty: 90 CAPSULE | Refills: 4 | Status: SHIPPED | OUTPATIENT
Start: 2019-12-27 | End: 2021-02-18 | Stop reason: SDUPTHER

## 2020-01-06 ENCOUNTER — TELEPHONE (OUTPATIENT)
Dept: CARDIOLOGY | Age: 70
End: 2020-01-06

## 2020-01-06 RX ORDER — ROSUVASTATIN CALCIUM 5 MG/1
5 TABLET, COATED ORAL NIGHTLY
Qty: 90 TABLET | Refills: 3 | Status: SHIPPED | OUTPATIENT
Start: 2020-01-06

## 2020-01-06 NOTE — TELEPHONE ENCOUNTER
Per Dr. [place] Nice discontinue pravastatin, after symptoms gone start Crestor 5 mg qhs. Advised patient of this. Rx sent to [facility] of her choice. She voiced understanding.

## 2020-03-17 ENCOUNTER — OFFICE VISIT (OUTPATIENT)
Dept: FAMILY MEDICINE CLINIC | Facility: CLINIC | Age: 70
End: 2020-03-17

## 2020-03-17 VITALS
BODY MASS INDEX: 29.02 KG/M2 | SYSTOLIC BLOOD PRESSURE: 149 MMHG | OXYGEN SATURATION: 97 % | HEIGHT: 64 IN | HEART RATE: 86 BPM | TEMPERATURE: 99.2 F | DIASTOLIC BLOOD PRESSURE: 95 MMHG | WEIGHT: 170 LBS

## 2020-03-17 DIAGNOSIS — J01.00 ACUTE NON-RECURRENT MAXILLARY SINUSITIS: Primary | ICD-10-CM

## 2020-03-17 PROCEDURE — 96372 THER/PROPH/DIAG INJ SC/IM: CPT | Performed by: NURSE PRACTITIONER

## 2020-03-17 PROCEDURE — 99213 OFFICE O/P EST LOW 20 MIN: CPT | Performed by: NURSE PRACTITIONER

## 2020-03-17 RX ORDER — METHYLPREDNISOLONE SODIUM SUCCINATE 40 MG/ML
40 INJECTION, POWDER, LYOPHILIZED, FOR SOLUTION INTRAMUSCULAR; INTRAVENOUS ONCE
Status: COMPLETED | OUTPATIENT
Start: 2020-03-17 | End: 2020-03-17

## 2020-03-17 RX ORDER — AZITHROMYCIN 250 MG/1
TABLET, FILM COATED ORAL
Qty: 6 TABLET | Refills: 0 | Status: SHIPPED | OUTPATIENT
Start: 2020-03-17 | End: 2020-06-09

## 2020-03-17 RX ORDER — ROSUVASTATIN CALCIUM 5 MG/1
5 TABLET, COATED ORAL NIGHTLY
COMMUNITY
Start: 2020-01-06 | End: 2020-05-04 | Stop reason: SDUPTHER

## 2020-03-17 RX ADMIN — METHYLPREDNISOLONE SODIUM SUCCINATE 40 MG: 40 INJECTION, POWDER, LYOPHILIZED, FOR SOLUTION INTRAMUSCULAR; INTRAVENOUS at 09:02

## 2020-03-17 NOTE — PROGRESS NOTES
CC: sore throat, cough, congestion    History:  Naina Manning is a 70 y.o. female who presents today for evaluation of the above problems.      URI    This is a new problem. The current episode started in the past 7 days. The problem has been gradually worsening. Maximum temperature: temp 99.2 today. Associated symptoms include congestion, coughing and ear pain. Pertinent negatives include no chest pain. Treatments tried: nasal steroid. The treatment provided no relief.     ROS:  Review of Systems   HENT: Positive for congestion and ear pain.    Respiratory: Positive for cough.    Cardiovascular: Negative for chest pain.       Allergies   Allergen Reactions   • Sulfa Antibiotics Hives   • Penicillins Hives   • Prednisone Other (See Comments)     thrush     Past Medical History:   Diagnosis Date   • Arthritis    • Hyperlipidemia    • Hypertension    • Hypothyroidism      Past Surgical History:   Procedure Laterality Date   • CHOLECYSTECTOMY     • COLONOSCOPY  10/04/2016    [facility] - DR KING   • EYE SURGERY     • SALPINGO OOPHORECTOMY Bilateral 4/24/2019    Procedure: Bilateral SALPINGO OOPHORECTOMY LAPAROSCOPIC;  Surgeon: Aashish Ty MD;  Location: [facility];  Service: Obstetrics/Gynecology   • TUBAL ABDOMINAL LIGATION       Family History   Problem Relation Age of Onset   • Cancer Mother    • Cancer Father    • No Known Problems Son    • Heart disease Maternal Grandmother    • No Known Problems Maternal Grandfather    • No Known Problems Paternal Grandmother    • No Known Problems Paternal Grandfather    • No Known Problems Son       reports that she has never smoked. She has never used smokeless tobacco. She reports that she drinks alcohol. She reports that she does not use drugs.      Current Outpatient Medications:   •  acetaminophen (TYLENOL) 500 MG tablet, Take 1,000 mg by mouth 2 (Two) Times a Day., Disp: , Rfl:   •  amLODIPine (NORVASC) 10 MG tablet, Take 1 tablet by mouth Daily., Disp: 90 tablet, Rfl:  "1  •  cetirizine (zyrTEC) 10 MG tablet, Take 10 mg by mouth Daily., Disp: , Rfl:   •  cholecalciferol (VITAMIN D3) 1000 units tablet, Take 1,000 Units by mouth Daily., Disp: , Rfl:   •  CloNIDine (CATAPRES) 0.1 MG tablet, Take 1 tablet by mouth Every Night., Disp: 90 tablet, Rfl: 1  •  cloNIDine (CATAPRES) 0.2 MG tablet, Take 1 tablet by mouth Every Morning., Disp: 90 tablet, Rfl: 1  •  estradiol (ESTRACE) 0.1 MG/GM vaginal cream, INSERT 2 GRAMS VAGINALLY DAILY, Disp: 42.5 g, Rfl: 5  •  fluticasone (FLONASE) 50 MCG/ACT nasal spray, 2 sprays by Each Nare route Daily., Disp: 16 g, Rfl: 3  •  indapamide (LOZOL) 2.5 MG tablet, Take 1 tablet by mouth Daily., Disp: 90 tablet, Rfl: 1  •  levothyroxine (SYNTHROID, LEVOTHROID) 50 MCG tablet, Take 1 tablet by mouth Every Morning., Disp: 90 tablet, Rfl: 1  •  losartan (COZAAR) 100 MG tablet, Take 1 tablet by mouth Every Morning., Disp: 90 tablet, Rfl: 1  •  omeprazole (priLOSEC) 40 MG capsule, TAKE 1 CAPSULE DAILY, Disp: 90 capsule, Rfl: 4  •  rosuvastatin (CRESTOR) 5 MG tablet, Take 5 mg by mouth Every Night., Disp: , Rfl:   •  albuterol sulfate HFA (VENTOLIN HFA) 108 (90 Base) MCG/ACT inhaler, Inhale 2 puffs Every 4 (Four) Hours As Needed for Wheezing., Disp: 1 inhaler, Rfl: 6  •  azithromycin (Zithromax) 250 MG tablet, Take 2 tablets the first day, then 1 tablet daily for 4 days., Disp: 6 tablet, Rfl: 0    Current Facility-Administered Medications:   •  methylPREDNISolone sodium succinate (SOLU-Medrol) injection 40 mg, 40 mg, Intramuscular, Once, Dana Roberts, APRN    OBJECTIVE:  /95 (BP Location: Left arm, Patient Position: Sitting, Cuff Size: Adult) Comment: pt states she took medication about 15 minutes ago  Pulse 86   Temp 99.2 °F (37.3 °C) (Oral)   Ht 162.6 cm (64\")   Wt 77.1 kg (170 lb)   SpO2 97%   Breastfeeding No   BMI 29.18 kg/m²    Physical Exam   Constitutional: She is oriented to person, place, and time. Vital signs are normal. She appears " well-developed and well-nourished.   HENT:   Right Ear: External ear and ear canal normal. A middle ear effusion is present.   Left Ear: External ear and ear canal normal. A middle ear effusion is present.   Mouth/Throat: Posterior oropharyngeal erythema present.   Cardiovascular: Normal rate and regular rhythm.   Pulmonary/Chest: Effort normal and breath sounds normal.   Neurological: She is alert and oriented to person, place, and time.   Psychiatric: She has a normal mood and affect. Her behavior is normal.   Vitals reviewed.      Assessment/Plan    Naina was seen today for sore throat, cough, earache and nasal congestion.    Diagnoses and all orders for this visit:    Acute non-recurrent maxillary sinusitis  -     methylPREDNISolone sodium succinate (SOLU-Medrol) injection 40 mg  -     azithromycin (Zithromax) 250 MG tablet; Take 2 tablets the first day, then 1 tablet daily for 4 days.    Add zyrtec in addition to flonase.     An After Visit Summary was printed and given to the patient at discharge.  Return if symptoms worsen or fail to improve, for Next scheduled follow up.       ELISEO Bowen 03/17/2020    Electronically signed.

## 2020-03-17 NOTE — PATIENT INSTRUCTIONS
Advance Care Planning and Advance Directives     You make decisions on a daily basis - decisions about where you want to live, your career, your home, your life. Perhaps one of the most important decisions you face is your choice for future medical care. Take time to talk with your family and your healthcare team and start planning today.  Advance Care Planning is a process that can help you:  · Understand possible future healthcare decisions in light of your own experiences  · Reflect on those decision in light of your goals and values  · Discuss your decisions with those closest to you and the healthcare professionals that care for you  · Make a plan by creating a document that reflects your wishes    Surrogate Decision Maker  In the event of a medical emergency, which has left you unable to communicate or to make your own decisions, you would need someone to make decisions for you.  It is important to discuss your preferences for medical treatment with this person while you are in good health.     Qualities of a surrogate decision maker:  • Willing to take on this role and responsibility  • Knows what you want for future medical care  • Willing to follow your wishes even if they don't agree with them  • Able to make difficult medical decisions under stressful circumstances    Advance Directives  These are legal documents you can create that will guide your healthcare team and decision maker(s) when needed. These documents can be stored in the electronic medical record.    · Living Will - a legal document to guide your care if you have a terminal condition or a serious illness and are unable to communicate. States vary by statute in document names/types, but most forms may include one or more of the following:        -  Directions regarding life-prolonging treatments        -  Directions regarding artificially provided nutrition/hydration        -  Choosing a healthcare decision maker        -  Direction  regarding organ/tissue donation    · Durable Power of  for Healthcare - this document names an -in-fact to make medical decisions for you, but it may also allow this person to make personal and financial decisions for you. Please seek the advice of an  if you need this type of document.    **Advance Directives are not required and no one may discriminate against you if you do not sign one.    Medical Orders  Many states allow specific forms/orders signed by your physician to record your wishes for medical treatment in your current state of health. This form, signed in personal communication with your physician, addresses resuscitation and other medical interventions that you may or may not want.      For more information or to schedule a time with a [facility] Advance Care Planning Facilitator contact: [email or web address] or call [phone] and someone will contact you directly.

## 2020-03-24 RX ORDER — CLONIDINE HYDROCHLORIDE 0.1 MG/1
TABLET ORAL
Qty: 90 TABLET | Refills: 0 | Status: SHIPPED | OUTPATIENT
Start: 2020-03-24 | End: 2020-06-23

## 2020-03-24 RX ORDER — FLUTICASONE PROPIONATE 50 MCG
SPRAY, SUSPENSION (ML) NASAL
Qty: 16 G | Refills: 11 | Status: SHIPPED | OUTPATIENT
Start: 2020-03-24 | End: 2021-03-30

## 2020-04-02 ENCOUNTER — TELEPHONE (OUTPATIENT)
Dept: CARDIOLOGY | Age: 70
End: 2020-04-02

## 2020-04-02 NOTE — TELEPHONE ENCOUNTER
Pt called wanting her lab results due to having to change her chol meds see note from Justin Trujillo on 1-6-20

## 2020-04-23 NOTE — TELEPHONE ENCOUNTER
Went over labs with pt she just wanted to make sure liver test were normal before she reordered her chol meds.

## 2020-05-04 RX ORDER — ROSUVASTATIN CALCIUM 5 MG/1
5 TABLET, COATED ORAL NIGHTLY
Qty: 90 TABLET | Refills: 3 | Status: SHIPPED | OUTPATIENT
Start: 2020-05-04 | End: 2021-02-22

## 2020-06-09 ENCOUNTER — OFFICE VISIT (OUTPATIENT)
Dept: FAMILY MEDICINE CLINIC | Facility: CLINIC | Age: 70
End: 2020-06-09

## 2020-06-09 VITALS
WEIGHT: 171 LBS | HEART RATE: 77 BPM | SYSTOLIC BLOOD PRESSURE: 124 MMHG | BODY MASS INDEX: 28.49 KG/M2 | OXYGEN SATURATION: 99 % | DIASTOLIC BLOOD PRESSURE: 79 MMHG | HEIGHT: 65 IN | TEMPERATURE: 98.4 F

## 2020-06-09 DIAGNOSIS — I10 ESSENTIAL HYPERTENSION: ICD-10-CM

## 2020-06-09 DIAGNOSIS — Z12.4 ROUTINE CERVICAL SMEAR: ICD-10-CM

## 2020-06-09 DIAGNOSIS — Z12.31 BREAST CANCER SCREENING BY MAMMOGRAM: Primary | ICD-10-CM

## 2020-06-09 DIAGNOSIS — Z00.00 ANNUAL PHYSICAL EXAM: ICD-10-CM

## 2020-06-09 DIAGNOSIS — N95.1 POST MENOPAUSAL SYNDROME: ICD-10-CM

## 2020-06-09 DIAGNOSIS — E78.2 MIXED HYPERLIPIDEMIA: ICD-10-CM

## 2020-06-09 DIAGNOSIS — Z12.4 CERVICAL CANCER SCREENING: ICD-10-CM

## 2020-06-09 DIAGNOSIS — R53.83 FATIGUE, UNSPECIFIED TYPE: ICD-10-CM

## 2020-06-09 LAB
BILIRUB BLD-MCNC: NEGATIVE MG/DL
CLARITY, POC: CLEAR
COLOR UR: YELLOW
GLUCOSE UR STRIP-MCNC: NEGATIVE MG/DL
KETONES UR QL: NEGATIVE
LEUKOCYTE EST, POC: NEGATIVE
NITRITE UR-MCNC: NEGATIVE MG/ML
PH UR: 8.5 [PH] (ref 5–8)
PROT UR STRIP-MCNC: NEGATIVE MG/DL
RBC # UR STRIP: NEGATIVE /UL
SP GR UR: 1.02 (ref 1–1.03)
UROBILINOGEN UR QL: NORMAL

## 2020-06-09 PROCEDURE — 81003 URINALYSIS AUTO W/O SCOPE: CPT | Performed by: FAMILY MEDICINE

## 2020-06-09 PROCEDURE — G0439 PPPS, SUBSEQ VISIT: HCPCS | Performed by: FAMILY MEDICINE

## 2020-06-09 NOTE — PROGRESS NOTES
The ABCs of the Annual Wellness Visit  Subsequent Medicare Wellness Visit    Chief Complaint   Patient presents with   • Medicare Wellness-subsequent     w/ pap, fasting       Subjective   History of Present Illness:  Naina Manning is a 70 y.o. female who presents for a Subsequent Medicare Wellness Visit.    HEALTH RISK ASSESSMENT    Recent Hospitalizations:  No hospitalization(s) within the last year.    Current Medical Providers:  Patient Care Team:  Juan Luis Zafar MD as PCP - General (Family Medicine)  Emeka Carranza MD as Consulting Physician (Cardiology)  Krunal Garner OD (Optometry)  Marie Malone (Internal Medicine)    Smoking Status:  Social History     Tobacco Use   Smoking Status Never Smoker   Smokeless Tobacco Never Used       Alcohol Consumption:  Social History     Substance and Sexual Activity   Alcohol Use Yes    Comment: occ       Depression Screen:   PHQ-2/PHQ-9 Depression Screening 6/9/2020   Little interest or pleasure in doing things 0   Feeling down, depressed, or hopeless 0   Trouble falling or staying asleep, or sleeping too much -   Feeling tired or having little energy -   Poor appetite or overeating -   Feeling bad about yourself - or that you are a failure or have let yourself or your family down -   Trouble concentrating on things, such as reading the newspaper or watching television -   Moving or speaking so slowly that other people could have noticed. Or the opposite - being so fidgety or restless that you have been moving around a lot more than usual -   Thoughts that you would be better off dead, or of hurting yourself in some way -   Total Score 0   If you checked off any problems, how difficult have these problems made it for you to do your work, take care of things at home, or get along with other people? -       Fall Risk Screen:  STEADI Fall Risk Assessment was completed, and patient is at LOW risk for falls.Assessment completed on:6/9/2020    Health Habits  and Functional and Cognitive Screening:  Functional & Cognitive Status 6/9/2020   Do you have difficulty preparing food and eating? No   Do you have difficulty bathing yourself, getting dressed or grooming yourself? No   Do you have difficulty using the toilet? No   Do you have difficulty moving around from place to place? No   Do you have trouble with steps or getting out of a bed or a chair? No   Current Diet Well Balanced Diet   Dental Exam Up to date   Eye Exam Up to date   Exercise (times per week) 5 times per week   Current Exercise Activities Include Cardiovasular Workout on Exercise Equipment   Do you need help using the phone?  No   Are you deaf or do you have serious difficulty hearing?  No   Do you need help with transportation? No   Do you need help shopping? No   Do you need help preparing meals?  No   Do you need help with housework?  No   Do you need help with laundry? No   Do you need help taking your medications? No   Do you need help managing money? No   Do you ever drive or ride in a car without wearing a seat belt? No   Have you felt unusual stress, anger or loneliness in the last month? No   Who do you live with? Spouse   If you need help, do you have trouble finding someone available to you? No   Have you been bothered in the last four weeks by sexual problems? No   Do you have difficulty concentrating, remembering or making decisions? No         Does the patient have evidence of cognitive impairment? No    Asprin use counseling:Does not need ASA (and currently is not on it)    Age-appropriate Screening Schedule:  Refer to the list below for future screening recommendations based on patient's age, sex and/or medical conditions. Orders for these recommended tests are listed in the plan section. The patient has been provided with a written plan.    Health Maintenance   Topic Date Due   • MAMMOGRAM  05/09/2020   • INFLUENZA VACCINE  08/01/2020   • LIPID PANEL  10/22/2020   • TDAP/TD VACCINES (2 -  Td) 07/16/2023   • COLONOSCOPY  10/04/2026   • ZOSTER VACCINE  Completed          The following portions of the patient's history were reviewed and updated as appropriate: allergies, current medications, past family history, past medical history, past social history, past surgical history and problem list.    Outpatient Medications Prior to Visit   Medication Sig Dispense Refill   • acetaminophen (TYLENOL) 500 MG tablet Take 1,000 mg by mouth 2 (Two) Times a Day.     • albuterol sulfate HFA (VENTOLIN HFA) 108 (90 Base) MCG/ACT inhaler Inhale 2 puffs Every 4 (Four) Hours As Needed for Wheezing. 1 inhaler 6   • amLODIPine (NORVASC) 10 MG tablet Take 1 tablet by mouth Daily. 90 tablet 1   • cetirizine (zyrTEC) 10 MG tablet Take 10 mg by mouth Daily.     • cholecalciferol (VITAMIN D3) 1000 units tablet Take 1,000 Units by mouth Daily.     • cloNIDine (CATAPRES) 0.1 MG tablet TAKE 1 TABLET EVERY NIGHT 90 tablet 0   • cloNIDine (CATAPRES) 0.2 MG tablet Take 1 tablet by mouth Every Morning. 90 tablet 1   • estradiol (ESTRACE) 0.1 MG/GM vaginal cream INSERT 2 GRAMS VAGINALLY DAILY 42.5 g 5   • fluticasone (FLONASE) 50 MCG/ACT nasal spray USE 2 SPRAYS IN EACH NOSTRIL DAILY 16 g 11   • indapamide (LOZOL) 2.5 MG tablet Take 1 tablet by mouth Daily. 90 tablet 1   • levothyroxine (SYNTHROID, LEVOTHROID) 50 MCG tablet Take 1 tablet by mouth Every Morning. 90 tablet 1   • losartan (COZAAR) 100 MG tablet Take 1 tablet by mouth Every Morning. 90 tablet 1   • omeprazole (priLOSEC) 40 MG capsule TAKE 1 CAPSULE DAILY 90 capsule 4   • rosuvastatin (CRESTOR) 5 MG tablet Take 1 tablet by mouth Every Night. 90 tablet 3   • azithromycin (Zithromax) 250 MG tablet Take 2 tablets the first day, then 1 tablet daily for 4 days. 6 tablet 0     No facility-administered medications prior to visit.        Patient Active Problem List   Diagnosis   • Body mass index (BMI) of 25.0 to 29.9   • Essential hypertension   • Hypertension   • Hypothyroid   •  "Hypothyroidism   • Seasonal allergic rhinitis       Advanced Care Planning:  ACP discussion was held with the patient during this visit. Patient does not have an advance directive, declines further assistance.    Review of Systems   Cardiovascular: Negative for chest pain and leg swelling.   Genitourinary: Negative for difficulty urinating.   Musculoskeletal: Positive for arthralgias.        Trochanteric bursitis on right   All other systems reviewed and are negative.      Compared to one year ago, the patient feels her physical health is better.  Compared to one year ago, the patient feels her mental health is better.    Reviewed chart for potential of high risk medication in the elderly: yes  Reviewed chart for potential of harmful drug interactions in the elderly:yes    Objective         Vitals:    06/09/20 0834   BP: 124/79   BP Location: Left arm   Patient Position: Sitting   Cuff Size: Adult   Pulse: 77   Temp: 98.4 °F (36.9 °C)   TempSrc: Temporal   SpO2: 99%   Weight: 77.6 kg (171 lb)   Height: 165.1 cm (65\")   PainSc: 0-No pain       Body mass index is 28.46 kg/m².  Discussed the patient's BMI with her. The BMI is above average; BMI management plan is completed.    Physical Exam   Constitutional: She is oriented to person, place, and time. She appears well-developed.   Slightly overweight   HENT:   Right Ear: External ear normal.   Left Ear: External ear normal.   Eyes: Pupils are equal, round, and reactive to light. EOM are normal.   Neck: No thyromegaly present.   Good carotid pulses   Cardiovascular: Normal rate and regular rhythm.   Pulmonary/Chest: Effort normal and breath sounds normal.   Breast no masses noted   Abdominal: Soft. Bowel sounds are normal. She exhibits no mass.   Genitourinary: Vagina normal and uterus normal.   Genitourinary Comments: Cervix present Pap smear taken no adnexal masses   Musculoskeletal: She exhibits no edema.   Lymphadenopathy:     She has no cervical adenopathy. "   Neurological: She is alert and oriented to person, place, and time.   Skin: Skin is warm and dry. Capillary refill takes less than 2 seconds.   Psychiatric: She has a normal mood and affect. Her behavior is normal. Judgment and thought content normal.   Nursing note and vitals reviewed.            Assessment/Plan   Medicare Risks and Personalized Health Plan  CMS Preventative Services Quick Reference  Breast Cancer/Mammogram Screening  Fall Risk    The above risks/problems have been discussed with the patient.  Pertinent information has been shared with the patient in the After Visit Summary.  Follow up plans and orders are seen below in the Assessment/Plan Section.    Diagnoses and all orders for this visit:    1. Breast cancer screening by mammogram (Primary)  -     Mammo Screening Bilateral With CAD; Future    2. Post menopausal syndrome  -     DEXA Bone Density Axial; Future    3. Routine cervical smear    4. Mixed hyperlipidemia  -     Comprehensive Metabolic Panel  -     Lipid Panel    5. Fatigue, unspecified type  -     TSH  -     T4, free  -     CBC & Differential    6. Essential hypertension  -     POC Urinalysis Dipstick, Multipro    7. Cervical cancer screening  -     Pap IG (Image Guided)    8. Annual physical exam    Other orders  -     Cancel: Pap IG (Image Guided); Future      Follow Up:  Return in 6 months (on 12/9/2020).     An After Visit Summary and PPPS were given to the patient.     Plan above-keep exercising- flu shot early

## 2020-06-09 NOTE — PATIENT INSTRUCTIONS
Exercising to Stay Healthy  To become healthy and stay healthy, it is recommended that you do moderate-intensity and vigorous-intensity exercise. You can tell that you are exercising at a moderate intensity if your heart starts beating faster and you start breathing faster but can still hold a conversation. You can tell that you are exercising at a vigorous intensity if you are breathing much harder and faster and cannot hold a conversation while exercising.  Exercising regularly is important. It has many health benefits, such as:  · Improving overall fitness, flexibility, and endurance.  · Increasing bone density.  · Helping with weight control.  · Decreasing body fat.  · Increasing muscle strength.  · Reducing stress and tension.  · Improving overall health.  How often should I exercise?  Choose an activity that you enjoy, and set realistic goals. Your health care provider can help you make an activity plan that works for you.  Exercise regularly as told by your health care provider. This may include:  · Doing strength training two times a week, such as:  ? Lifting weights.  ? Using resistance bands.  ? Push-ups.  ? Sit-ups.  ? Yoga.  · Doing a certain intensity of exercise for a given amount of time. Choose from these options:  ? A total of 150 minutes of moderate-intensity exercise every week.  ? A total of 75 minutes of vigorous-intensity exercise every week.  ? A mix of moderate-intensity and vigorous-intensity exercise every week.  Children, pregnant women, people who have not exercised regularly, people who are overweight, and older adults may need to talk with a health care provider about what activities are safe to do. If you have a medical condition, be sure to talk with your health care provider before you start a new exercise program.  What are some exercise ideas?  Moderate-intensity exercise ideas include:  · Walking 1 mile (1.6 km) in about 15  minutes.  · Biking.  · Hiking.  · Golfing.  · Dancing.  · Water aerobics.  Vigorous-intensity exercise ideas include:  · Walking 4.5 miles (7.2 km) or more in about 1 hour.  · Jogging or running 5 miles (8 km) in about 1 hour.  · Biking 10 miles (16.1 km) or more in about 1 hour.  · Lap swimming.  · Roller-skating or in-line skating.  · Cross-country skiing.  · Vigorous competitive sports, such as football, basketball, and soccer.  · Jumping rope.  · Aerobic dancing.  What are some everyday activities that can help me to get exercise?  · Yard work, such as:  ? Pushing a .  ? Raking and bagging leaves.  · Washing your car.  · Pushing a stroller.  · Shoveling snow.  · Gardening.  · Washing windows or floors.  How can I be more active in my day-to-day activities?  · Use stairs instead of an elevator.  · Take a walk during your lunch break.  · If you drive, park your car farther away from your work or school.  · If you take public transportation, get off one stop early and walk the rest of the way.  · Stand up or walk around during all of your indoor phone calls.  · Get up, stretch, and walk around every 30 minutes throughout the day.  · Enjoy exercise with a friend. Support to continue exercising will help you keep a regular routine of activity.  What guidelines can I follow while exercising?  · Before you start a new exercise program, talk with your health care provider.  · Do not exercise so much that you hurt yourself, feel dizzy, or get very short of breath.  · Wear comfortable clothes and wear shoes with good support.  · Drink plenty of water while you exercise to prevent dehydration or heat stroke.  · Work out until your breathing and your heartbeat get faster.  Where to find more information  · [place] Department of Health and Human Services: www.hhs.gov  · Centers for Disease Control and Prevention (CDC): www.cdc.gov  Summary  · Exercising regularly is important. It will improve your overall fitness,  flexibility, and endurance.  · Regular exercise also will improve your overall health. It can help you control your weight, reduce stress, and improve your bone density.  · Do not exercise so much that you hurt yourself, feel dizzy, or get very short of breath.  · Before you start a new exercise program, talk with your health care provider.  This information is not intended to replace advice given to you by your health care provider. Make sure you discuss any questions you have with your health care provider.  Document Released: 01/20/2012 Document Revised: 11/30/2018 Document Reviewed: 11/08/2018  Elsevier Patient Education © 2020 [employer]      Fall Prevention in the Home, Adult  Falls can cause injuries and can affect people from all age groups. There are many simple things that you can do to make your home safe and to help prevent falls. Ask for help when making these changes, if needed.  What actions can I take to prevent falls?  General instructions  · Use good lighting in all rooms. Replace any light bulbs that burn out.  · Turn on lights if it is dark. Use night-lights.  · Place frequently used items in easy-to-reach places. Lower the shelves around your home if necessary.  · Set up furniture so that there are clear paths around it. Avoid moving your furniture around.  · Remove throw rugs and other tripping hazards from the floor.  · Avoid walking on wet floors.  · Fix any uneven floor surfaces.  · Add color or contrast paint or tape to grab bars and handrails in your home. Place contrasting color strips on the first and last steps of stairways.  · When you use a stepladder, make sure that it is completely opened and that the sides are firmly locked. Have someone hold the ladder while you are using it. Do not climb a closed stepladder.  · Be aware of any and all pets.  What can I do in the bathroom?         · Keep the floor dry. Immediately clean up any water that spills onto the floor.  · Remove soap  buildup in the tub or shower on a regular basis.  · Use non-skid mats or decals on the floor of the tub or shower.  · Attach bath mats securely with double-sided, non-slip rug tape.  · If you need to sit down while you are in the shower, use a plastic, non-slip stool.  · Install grab bars by the toilet and in the tub and shower. Do not use towel bars as grab bars.  What can I do in the bedroom?  · Make sure that a bedside light is easy to reach.  · Do not use oversized bedding that drapes onto the floor.  · Have a firm chair that has side arms to use for getting dressed.  What can I do in the kitchen?  · Clean up any spills right away.  · If you need to reach for something above you, use a sturdy step stool that has a grab bar.  · Keep electrical cables out of the way.  · Do not use floor polish or wax that makes floors slippery. If you must use wax, make sure that it is non-skid floor wax.  What can I do in the stairways?  · Do not leave any items on the stairs.  · Make sure that you have a light switch at the top of the stairs and the bottom of the stairs. Have them installed if you do not have them.  · Make sure that there are handrails on both sides of the stairs. Fix handrails that are broken or loose. Make sure that handrails are as long as the stairways.  · Install non-slip stair treads on all stairs in your home.  · Avoid having throw rugs at the top or bottom of stairways, or secure the rugs with carpet tape to prevent them from moving.  · Choose a carpet design that does not hide the edge of steps on the stairway.  · Check any carpeting to make sure that it is firmly attached to the stairs. Fix any carpet that is loose or worn.  What can I do on the outside of my home?  · Use bright outdoor lighting.  · Regularly repair the edges of walkways and driveways and fix any cracks.  · Remove high doorway thresholds.  · Trim any shrubbery on the main path into your home.  · Regularly check that handrails are  securely fastened and in good repair. Both sides of any steps should have handrails.  · Install guardrails along the edges of any raised decks or porches.  · Clear walkways of debris and clutter, including tools and rocks.  · Have leaves, snow, and ice cleared regularly.  · Use sand or salt on walkways during winter months.  · In the garage, clean up any spills right away, including grease or oil spills.  What other actions can I take?  · Wear closed-toe shoes that fit well and support your feet. Wear shoes that have rubber soles or low heels.  · Use mobility aids as needed, such as canes, walkers, scooters, and crutches.  · Review your medicines with your health care provider. Some medicines can cause dizziness or changes in blood pressure, which increase your risk of falling.  Talk with your health care provider about other ways that you can decrease your risk of falls. This may include working with a physical therapist or  to improve your strength, balance, and endurance.  Where to find more information  · Centers for Disease Control and Prevention, STEADI: https://www.cdc.gov  · [place] [employer]: https://jy0mgxq.adán.nih.gov  Contact a health care provider if:  · You are afraid of falling at home.  · You feel weak, drowsy, or dizzy at home.  · You fall at home.  Summary  · There are many simple things that you can do to make your home safe and to help prevent falls.  · Ways to make your home safe include removing tripping hazards and installing grab bars in the bathroom.  · Ask for help when making these changes in your home.  This information is not intended to replace advice given to you by your health care provider. Make sure you discuss any questions you have with your health care provider.  Document Released: 12/08/2003 Document Revised: 11/30/2018 Document Reviewed: 08/02/2018  [employer] Patient Education © 2020 [employer]    Medicare Wellness  Personal Prevention Plan of Service      Date of Office Visit:  2020  Encounter Provider:  Juan Luis Zafar MD  Place of Service:  [facility]  Patient Name: Naina Manning  :  1950    As part of the Medicare Wellness portion of your visit today, we are providing you with this personalized preventive plan of services (PPPS). This plan is based upon recommendations of the [place] Preventive Services Task Force (USPSTF) and the Advisory Committee on Immunization Practices (ACIP).    This lists the preventive care services that should be considered, and provides dates of when you are due. Items listed as completed are up-to-date and do not require any further intervention.    Health Maintenance   Topic Date Due   • Pneumococcal Vaccine Once at 65 Years Old  01/10/2017   • MAMMOGRAM  2020   • INFLUENZA VACCINE  2020   • LIPID PANEL  10/22/2020   • MEDICARE ANNUAL WELLNESS  2021   • TDAP/TD VACCINES (2 - Td) 2023   • COLONOSCOPY  10/04/2026   • HEPATITIS C SCREENING  Completed   • ZOSTER VACCINE  Completed       No orders of the defined types were placed in this encounter.      Return in 6 months (on 2020).

## 2020-06-10 LAB
ALBUMIN SERPL-MCNC: 4.5 G/DL (ref 3.5–5.2)
ALBUMIN/GLOB SERPL: 1.4 G/DL
ALP SERPL-CCNC: 72 U/L (ref 39–117)
ALT SERPL-CCNC: 19 U/L (ref 1–33)
AST SERPL-CCNC: 16 U/L (ref 1–32)
BASOPHILS # BLD AUTO: 0.06 10*3/MM3 (ref 0–0.2)
BASOPHILS NFR BLD AUTO: 0.8 % (ref 0–1.5)
BILIRUB SERPL-MCNC: 0.4 MG/DL (ref 0.2–1.2)
BUN SERPL-MCNC: 11 MG/DL (ref 8–23)
BUN/CREAT SERPL: 14.3 (ref 7–25)
CALCIUM SERPL-MCNC: 9.6 MG/DL (ref 8.6–10.5)
CHLORIDE SERPL-SCNC: 95 MMOL/L (ref 98–107)
CHOLEST SERPL-MCNC: 170 MG/DL (ref 0–200)
CO2 SERPL-SCNC: 29 MMOL/L (ref 22–29)
CREAT SERPL-MCNC: 0.77 MG/DL (ref 0.57–1)
CYTOLOGIST CVX/VAG CYTO: NORMAL
CYTOLOGY CVX/VAG DOC CYTO: NORMAL
CYTOLOGY CVX/VAG DOC THIN PREP: NORMAL
DX ICD CODE: NORMAL
EOSINOPHIL # BLD AUTO: 0.15 10*3/MM3 (ref 0–0.4)
EOSINOPHIL NFR BLD AUTO: 2 % (ref 0.3–6.2)
ERYTHROCYTE [DISTWIDTH] IN BLOOD BY AUTOMATED COUNT: 13.3 % (ref 12.3–15.4)
GLOBULIN SER CALC-MCNC: 3.2 GM/DL
GLUCOSE SERPL-MCNC: 96 MG/DL (ref 65–99)
HCT VFR BLD AUTO: 43.5 % (ref 34–46.6)
HDLC SERPL-MCNC: 44 MG/DL (ref 40–60)
HGB BLD-MCNC: 14.5 G/DL (ref 12–15.9)
HIV 1 & 2 AB SER-IMP: NORMAL
IMM GRANULOCYTES # BLD AUTO: 0.03 10*3/MM3 (ref 0–0.05)
IMM GRANULOCYTES NFR BLD AUTO: 0.4 % (ref 0–0.5)
LDLC SERPL CALC-MCNC: 96 MG/DL (ref 0–100)
LYMPHOCYTES # BLD AUTO: 2.25 10*3/MM3 (ref 0.7–3.1)
LYMPHOCYTES NFR BLD AUTO: 30.7 % (ref 19.6–45.3)
MCH RBC QN AUTO: 30.5 PG (ref 26.6–33)
MCHC RBC AUTO-ENTMCNC: 33.3 G/DL (ref 31.5–35.7)
MCV RBC AUTO: 91.4 FL (ref 79–97)
MONOCYTES # BLD AUTO: 0.49 10*3/MM3 (ref 0.1–0.9)
MONOCYTES NFR BLD AUTO: 6.7 % (ref 5–12)
NEUTROPHILS # BLD AUTO: 4.35 10*3/MM3 (ref 1.7–7)
NEUTROPHILS NFR BLD AUTO: 59.4 % (ref 42.7–76)
NRBC BLD AUTO-RTO: 0 /100 WBC (ref 0–0.2)
OTHER STN SPEC: NORMAL
PLATELET # BLD AUTO: 467 10*3/MM3 (ref 140–450)
POTASSIUM SERPL-SCNC: 4.7 MMOL/L (ref 3.5–5.2)
PROT SERPL-MCNC: 7.7 G/DL (ref 6–8.5)
RBC # BLD AUTO: 4.76 10*6/MM3 (ref 3.77–5.28)
SODIUM SERPL-SCNC: 135 MMOL/L (ref 136–145)
STAT OF ADQ CVX/VAG CYTO-IMP: NORMAL
T4 FREE SERPL-MCNC: 1.18 NG/DL (ref 0.93–1.7)
TRIGL SERPL-MCNC: 151 MG/DL (ref 0–150)
TSH SERPL DL<=0.005 MIU/L-ACNC: 1.71 UIU/ML (ref 0.27–4.2)
VLDLC SERPL CALC-MCNC: 30.2 MG/DL
WBC # BLD AUTO: 7.33 10*3/MM3 (ref 3.4–10.8)

## 2020-06-23 RX ORDER — INDAPAMIDE 2.5 MG/1
TABLET, FILM COATED ORAL
Qty: 90 TABLET | Refills: 3 | Status: SHIPPED | OUTPATIENT
Start: 2020-06-23 | End: 2021-05-18

## 2020-06-23 RX ORDER — CLONIDINE HYDROCHLORIDE 0.1 MG/1
TABLET ORAL
Qty: 90 TABLET | Refills: 3 | Status: SHIPPED | OUTPATIENT
Start: 2020-06-23 | End: 2021-05-18

## 2020-06-23 RX ORDER — CLONIDINE HYDROCHLORIDE 0.2 MG/1
TABLET ORAL
Qty: 90 TABLET | Refills: 3 | Status: SHIPPED | OUTPATIENT
Start: 2020-06-23 | End: 2021-05-18

## 2020-06-23 RX ORDER — AMLODIPINE BESYLATE 10 MG/1
TABLET ORAL
Qty: 90 TABLET | Refills: 3 | Status: SHIPPED | OUTPATIENT
Start: 2020-06-23 | End: 2021-05-18

## 2020-06-23 RX ORDER — LOSARTAN POTASSIUM 100 MG/1
TABLET ORAL
Qty: 90 TABLET | Refills: 3 | Status: SHIPPED | OUTPATIENT
Start: 2020-06-23 | End: 2021-05-18

## 2020-06-23 RX ORDER — LEVOTHYROXINE SODIUM 0.05 MG/1
TABLET ORAL
Qty: 90 TABLET | Refills: 3 | Status: SHIPPED | OUTPATIENT
Start: 2020-06-23 | End: 2021-05-18

## 2020-07-29 ENCOUNTER — OFFICE VISIT (OUTPATIENT)
Dept: FAMILY MEDICINE CLINIC | Facility: CLINIC | Age: 70
End: 2020-07-29

## 2020-07-29 VITALS
RESPIRATION RATE: 18 BRPM | SYSTOLIC BLOOD PRESSURE: 110 MMHG | OXYGEN SATURATION: 96 % | HEIGHT: 65 IN | WEIGHT: 173 LBS | HEART RATE: 76 BPM | TEMPERATURE: 97.8 F | DIASTOLIC BLOOD PRESSURE: 70 MMHG | BODY MASS INDEX: 28.82 KG/M2

## 2020-07-29 DIAGNOSIS — M25.551 RIGHT HIP PAIN: Primary | ICD-10-CM

## 2020-07-29 PROCEDURE — 20610 DRAIN/INJ JOINT/BURSA W/O US: CPT | Performed by: FAMILY MEDICINE

## 2020-07-29 RX ORDER — LIDOCAINE HYDROCHLORIDE 10 MG/ML
2 INJECTION, SOLUTION INFILTRATION; PERINEURAL
Status: COMPLETED | OUTPATIENT
Start: 2020-07-29 | End: 2020-07-29

## 2020-07-29 RX ORDER — METHYLPREDNISOLONE ACETATE 40 MG/ML
80 INJECTION, SUSPENSION INTRA-ARTICULAR; INTRALESIONAL; INTRAMUSCULAR; SOFT TISSUE
Status: COMPLETED | OUTPATIENT
Start: 2020-07-29 | End: 2020-07-29

## 2020-07-29 RX ADMIN — METHYLPREDNISOLONE ACETATE 80 MG: 40 INJECTION, SUSPENSION INTRA-ARTICULAR; INTRALESIONAL; INTRAMUSCULAR; SOFT TISSUE at 10:03

## 2020-07-29 RX ADMIN — LIDOCAINE HYDROCHLORIDE 2 ML: 10 INJECTION, SOLUTION INFILTRATION; PERINEURAL at 10:03

## 2020-07-29 NOTE — PROGRESS NOTES
Procedure   Arthrocentesis  Date/Time: 7/29/2020 10:03 AM  Performed by: Juan Luis Zafar MD  Authorized by: Juan Luis Zafar MD   Indications: pain   Body area: hip  Joint: right hip  Local anesthesia used: no    Anesthesia:  Local anesthesia used: no    Sedation:  Patient sedated: no    Preparation: Patient was prepped and draped in the usual sterile fashion.  Needle gauge: 22-gauge spinal needle.  Ultrasound guidance: no  Approach: lateral  Aspirate amount: 0 mL  Patient tolerance: Patient tolerated the procedure well with no immediate complications  Comments: The patient has trochanteric bursitis of the right hip.  She had the above procedure performed without incident.  She has exercises from rehab and knows other modalities for therapy

## 2020-09-03 ENCOUNTER — FLU SHOT (OUTPATIENT)
Dept: FAMILY MEDICINE CLINIC | Facility: CLINIC | Age: 70
End: 2020-09-03

## 2020-09-03 DIAGNOSIS — Z23 NEED FOR INFLUENZA VACCINATION: ICD-10-CM

## 2020-09-03 PROCEDURE — G0008 ADMIN INFLUENZA VIRUS VAC: HCPCS | Performed by: NURSE PRACTITIONER

## 2020-09-03 PROCEDURE — 90694 VACC AIIV4 NO PRSRV 0.5ML IM: CPT | Performed by: NURSE PRACTITIONER

## 2020-09-23 RX ORDER — ESTRADIOL 0.1 MG/G
CREAM VAGINAL
Qty: 42.5 G | Refills: 16 | Status: SHIPPED | OUTPATIENT
Start: 2020-09-23 | End: 2021-10-20

## 2020-11-25 ENCOUNTER — OFFICE VISIT (OUTPATIENT)
Dept: FAMILY MEDICINE CLINIC | Facility: CLINIC | Age: 70
End: 2020-11-25

## 2020-11-25 VITALS
OXYGEN SATURATION: 98 % | DIASTOLIC BLOOD PRESSURE: 78 MMHG | TEMPERATURE: 97.1 F | SYSTOLIC BLOOD PRESSURE: 124 MMHG | RESPIRATION RATE: 16 BRPM | HEART RATE: 85 BPM | BODY MASS INDEX: 28.32 KG/M2 | WEIGHT: 170 LBS | HEIGHT: 65 IN

## 2020-11-25 DIAGNOSIS — I10 ESSENTIAL HYPERTENSION: Primary | ICD-10-CM

## 2020-11-25 DIAGNOSIS — R35.0 FREQUENCY OF URINATION: ICD-10-CM

## 2020-11-25 LAB
BILIRUB BLD-MCNC: NEGATIVE MG/DL
CLARITY, POC: CLEAR
COLOR UR: YELLOW
GLUCOSE UR STRIP-MCNC: NEGATIVE MG/DL
KETONES UR QL: NEGATIVE
LEUKOCYTE EST, POC: ABNORMAL
NITRITE UR-MCNC: NEGATIVE MG/ML
PH UR: 7 [PH] (ref 5–8)
PROT UR STRIP-MCNC: NEGATIVE MG/DL
RBC # UR STRIP: NEGATIVE /UL
SP GR UR: 1.02 (ref 1–1.03)
UROBILINOGEN UR QL: NORMAL

## 2020-11-25 PROCEDURE — 99213 OFFICE O/P EST LOW 20 MIN: CPT | Performed by: FAMILY MEDICINE

## 2020-11-25 PROCEDURE — 81003 URINALYSIS AUTO W/O SCOPE: CPT | Performed by: FAMILY MEDICINE

## 2020-11-25 NOTE — PROGRESS NOTES
Subjective   Naina Manning is a 70 y.o. female.     70-year-old female with urinary frequency    Urinary Frequency   This is a new problem. The current episode started in the past 7 days. The problem occurs intermittently. The problem has been waxing and waning. The patient is experiencing no pain. There has been no fever. She is sexually active. There is no history of pyelonephritis. Associated symptoms include frequency. Pertinent negatives include no chills, flank pain, hematuria or hesitancy. She has tried nothing for the symptoms.       The following portions of the patient's history were reviewed and updated as appropriate: allergies, current medications, past family history, past medical history, past social history, past surgical history and problem list.    Review of Systems   Constitutional: Negative for chills.   Gastrointestinal: Negative for abdominal distention.   Genitourinary: Positive for frequency. Negative for difficulty urinating, dysuria, flank pain, hematuria and hesitancy.   Musculoskeletal: Negative for back pain.       Objective   Physical Exam  Vitals signs and nursing note reviewed.   Constitutional:       Appearance: Normal appearance.   Cardiovascular:      Rate and Rhythm: Normal rate and regular rhythm.   Pulmonary:      Effort: Pulmonary effort is normal.      Breath sounds: Normal breath sounds.   Abdominal:      Palpations: Abdomen is soft.      Tenderness: There is abdominal tenderness.      Comments: Some suprapubic tenderness   Musculoskeletal:      Right lower leg: No edema.      Left lower leg: No edema.   Neurological:      General: No focal deficit present.      Mental Status: She is alert and oriented to person, place, and time.   Psychiatric:         Mood and Affect: Mood normal.         Behavior: Behavior normal.         Thought Content: Thought content normal.         Judgment: Judgment normal.         Assessment/Plan   Diagnoses and all orders for this visit:    1.  Essential hypertension (Primary)  -     CBC & Differential  -     Basic Metabolic Panel    2. Frequency of urination  -     POC Urinalysis Dipstick, Multipro       Plan-above-bladder scan

## 2020-11-25 NOTE — PROGRESS NOTES
Bladder Scan interpretation  Estimation of residual urine via abdominal ultrasound  Residual Urine: 85 ml  Indication: Nocturia   Position: Supine  Examination: Incremental scanning of the suprapubic area using 3 MHz transducer using copious amounts of acoustic gel.   Findings: An anechoic area was demonstrated which represented the bladder, with measurement of residual urine as noted. I inspected this myself. In that the residual urine was stable or insignificant, no treatment will be necessary at this time.

## 2020-11-26 LAB
BASOPHILS # BLD AUTO: 0.06 10*3/MM3 (ref 0–0.2)
BASOPHILS NFR BLD AUTO: 0.6 % (ref 0–1.5)
BUN SERPL-MCNC: 13 MG/DL (ref 8–23)
BUN/CREAT SERPL: 20.3 (ref 7–25)
CALCIUM SERPL-MCNC: 9.4 MG/DL (ref 8.6–10.5)
CHLORIDE SERPL-SCNC: 96 MMOL/L (ref 98–107)
CO2 SERPL-SCNC: 29 MMOL/L (ref 22–29)
CREAT SERPL-MCNC: 0.64 MG/DL (ref 0.57–1)
EOSINOPHIL # BLD AUTO: 0.1 10*3/MM3 (ref 0–0.4)
EOSINOPHIL NFR BLD AUTO: 1 % (ref 0.3–6.2)
ERYTHROCYTE [DISTWIDTH] IN BLOOD BY AUTOMATED COUNT: 12.5 % (ref 12.3–15.4)
GLUCOSE SERPL-MCNC: 88 MG/DL (ref 65–99)
HCT VFR BLD AUTO: 42 % (ref 34–46.6)
HGB BLD-MCNC: 14.2 G/DL (ref 12–15.9)
IMM GRANULOCYTES # BLD AUTO: 0.03 10*3/MM3 (ref 0–0.05)
IMM GRANULOCYTES NFR BLD AUTO: 0.3 % (ref 0–0.5)
LYMPHOCYTES # BLD AUTO: 2.41 10*3/MM3 (ref 0.7–3.1)
LYMPHOCYTES NFR BLD AUTO: 25.3 % (ref 19.6–45.3)
MCH RBC QN AUTO: 30.5 PG (ref 26.6–33)
MCHC RBC AUTO-ENTMCNC: 33.8 G/DL (ref 31.5–35.7)
MCV RBC AUTO: 90.3 FL (ref 79–97)
MONOCYTES # BLD AUTO: 0.68 10*3/MM3 (ref 0.1–0.9)
MONOCYTES NFR BLD AUTO: 7.1 % (ref 5–12)
NEUTROPHILS # BLD AUTO: 6.26 10*3/MM3 (ref 1.7–7)
NEUTROPHILS NFR BLD AUTO: 65.7 % (ref 42.7–76)
NRBC BLD AUTO-RTO: 0 /100 WBC (ref 0–0.2)
PLATELET # BLD AUTO: 463 10*3/MM3 (ref 140–450)
POTASSIUM SERPL-SCNC: 4.8 MMOL/L (ref 3.5–5.2)
RBC # BLD AUTO: 4.65 10*6/MM3 (ref 3.77–5.28)
SODIUM SERPL-SCNC: 135 MMOL/L (ref 136–145)
WBC # BLD AUTO: 9.54 10*3/MM3 (ref 3.4–10.8)

## 2020-12-10 ENCOUNTER — LAB (OUTPATIENT)
Dept: FAMILY MEDICINE CLINIC | Facility: CLINIC | Age: 70
End: 2020-12-10

## 2020-12-10 DIAGNOSIS — E78.2 MIXED HYPERLIPIDEMIA: Primary | ICD-10-CM

## 2020-12-11 LAB
ALBUMIN SERPL-MCNC: 4.5 G/DL (ref 3.5–5.2)
ALBUMIN/GLOB SERPL: 1.8 G/DL
ALP SERPL-CCNC: 69 U/L (ref 39–117)
ALT SERPL-CCNC: 16 U/L (ref 1–33)
AST SERPL-CCNC: 15 U/L (ref 1–32)
BILIRUB SERPL-MCNC: 0.4 MG/DL (ref 0–1.2)
BUN SERPL-MCNC: 15 MG/DL (ref 8–23)
BUN/CREAT SERPL: 21.7 (ref 7–25)
CALCIUM SERPL-MCNC: 9.3 MG/DL (ref 8.6–10.5)
CHLORIDE SERPL-SCNC: 97 MMOL/L (ref 98–107)
CHOLEST SERPL-MCNC: 168 MG/DL (ref 0–200)
CO2 SERPL-SCNC: 30.9 MMOL/L (ref 22–29)
CREAT SERPL-MCNC: 0.69 MG/DL (ref 0.57–1)
GLOBULIN SER CALC-MCNC: 2.5 GM/DL
GLUCOSE SERPL-MCNC: 92 MG/DL (ref 65–99)
HDLC SERPL-MCNC: 44 MG/DL (ref 40–60)
LDLC SERPL CALC-MCNC: 98 MG/DL (ref 0–100)
POTASSIUM SERPL-SCNC: 4.6 MMOL/L (ref 3.5–5.2)
PROT SERPL-MCNC: 7 G/DL (ref 6–8.5)
SODIUM SERPL-SCNC: 136 MMOL/L (ref 136–145)
TRIGL SERPL-MCNC: 150 MG/DL (ref 0–150)
VLDLC SERPL CALC-MCNC: 26 MG/DL (ref 5–40)

## 2021-02-18 RX ORDER — OMEPRAZOLE 40 MG/1
40 CAPSULE, DELAYED RELEASE ORAL DAILY
Qty: 90 CAPSULE | Refills: 3 | Status: SHIPPED | OUTPATIENT
Start: 2021-02-18 | End: 2022-04-11

## 2021-02-22 RX ORDER — ROSUVASTATIN CALCIUM 5 MG/1
TABLET, COATED ORAL
Qty: 90 TABLET | Refills: 1 | Status: SHIPPED | OUTPATIENT
Start: 2021-02-22 | End: 2021-09-02

## 2021-02-23 ENCOUNTER — OFFICE VISIT (OUTPATIENT)
Dept: FAMILY MEDICINE CLINIC | Facility: CLINIC | Age: 71
End: 2021-02-23

## 2021-02-23 VITALS
SYSTOLIC BLOOD PRESSURE: 122 MMHG | HEART RATE: 86 BPM | TEMPERATURE: 98.2 F | WEIGHT: 173 LBS | DIASTOLIC BLOOD PRESSURE: 78 MMHG | HEIGHT: 65 IN | OXYGEN SATURATION: 97 % | RESPIRATION RATE: 16 BRPM | BODY MASS INDEX: 28.82 KG/M2

## 2021-02-23 DIAGNOSIS — M79.641 RIGHT HAND PAIN: Primary | ICD-10-CM

## 2021-02-23 PROCEDURE — 99213 OFFICE O/P EST LOW 20 MIN: CPT | Performed by: FAMILY MEDICINE

## 2021-02-23 NOTE — PROGRESS NOTES
Subjective   Naina Manning is a 71 y.o. female.     71-year-old female with pain right fifth digit-no injury    Pain  This is a new problem. The current episode started 1 to 4 weeks ago. The problem occurs daily. The problem has been rapidly worsening. Pertinent negatives include no chills, myalgias or rash. Associated symptoms comments: Right hand-fifth digit pain PIP joint. Nothing aggravates the symptoms. She has tried nothing for the symptoms.       The following portions of the patient's history were reviewed and updated as appropriate: allergies, current medications, past family history, past medical history, past social history, past surgical history and problem list.    Review of Systems   Constitutional: Negative for chills.   Musculoskeletal: Negative for myalgias.        Pain PIP joint right fifth digit of hand   Skin: Negative for rash.       Objective   Physical Exam  Vitals signs and nursing note reviewed.   Constitutional:       Appearance: Normal appearance.   Cardiovascular:      Rate and Rhythm: Normal rate and regular rhythm.   Pulmonary:      Effort: Pulmonary effort is normal.      Breath sounds: Normal breath sounds.   Musculoskeletal:         General: Swelling and tenderness present.      Comments: Right hand-range of motion normal-PIP joint fifth digit swollen tender   Neurological:      General: No focal deficit present.      Mental Status: She is alert and oriented to person, place, and time.   Psychiatric:         Mood and Affect: Mood normal.         Behavior: Behavior normal.         Assessment/Plan   Diagnoses and all orders for this visit:    1. Right hand pain (Primary)  -     XR Hand 3+ View Right; Future    Other orders  -     Cancel: XR Hand 3+ View Right; Future       Plan x-ray-had first Covid shot

## 2021-03-30 RX ORDER — FLUTICASONE PROPIONATE 50 MCG
SPRAY, SUSPENSION (ML) NASAL
Qty: 16 G | Refills: 11 | Status: SHIPPED | OUTPATIENT
Start: 2021-03-30 | End: 2022-04-11

## 2021-05-11 ENCOUNTER — OFFICE VISIT (OUTPATIENT)
Dept: CARDIOLOGY CLINIC | Age: 71
End: 2021-05-11
Payer: MEDICARE

## 2021-05-11 VITALS
WEIGHT: 167 LBS | HEART RATE: 64 BPM | BODY MASS INDEX: 27.82 KG/M2 | HEIGHT: 65 IN | DIASTOLIC BLOOD PRESSURE: 70 MMHG | SYSTOLIC BLOOD PRESSURE: 132 MMHG

## 2021-05-11 DIAGNOSIS — R07.9 RECURRENT CHEST PAIN: Primary | ICD-10-CM

## 2021-05-11 PROCEDURE — 1090F PRES/ABSN URINE INCON ASSESS: CPT | Performed by: INTERNAL MEDICINE

## 2021-05-11 PROCEDURE — G8400 PT W/DXA NO RESULTS DOC: HCPCS | Performed by: INTERNAL MEDICINE

## 2021-05-11 PROCEDURE — 1036F TOBACCO NON-USER: CPT | Performed by: INTERNAL MEDICINE

## 2021-05-11 PROCEDURE — 4040F PNEUMOC VAC/ADMIN/RCVD: CPT | Performed by: INTERNAL MEDICINE

## 2021-05-11 PROCEDURE — 3017F COLORECTAL CA SCREEN DOC REV: CPT | Performed by: INTERNAL MEDICINE

## 2021-05-11 PROCEDURE — 1123F ACP DISCUSS/DSCN MKR DOCD: CPT | Performed by: INTERNAL MEDICINE

## 2021-05-11 PROCEDURE — G8427 DOCREV CUR MEDS BY ELIG CLIN: HCPCS | Performed by: INTERNAL MEDICINE

## 2021-05-11 PROCEDURE — 99212 OFFICE O/P EST SF 10 MIN: CPT | Performed by: INTERNAL MEDICINE

## 2021-05-11 PROCEDURE — G8419 CALC BMI OUT NRM PARAM NOF/U: HCPCS | Performed by: INTERNAL MEDICINE

## 2021-05-11 PROCEDURE — 93000 ELECTROCARDIOGRAM COMPLETE: CPT | Performed by: INTERNAL MEDICINE

## 2021-05-11 RX ORDER — ACETAMINOPHEN 325 MG/1
650 TABLET ORAL EVERY 6 HOURS PRN
COMMUNITY

## 2021-05-11 NOTE — PROGRESS NOTES
HISTORY  63-year-old lady with a family history of coronary disease [father in his late 69s] and a personal history of sleep apnea, dyslipidemia, and hypertension returns for routine follow-up visit. Stress testing last obtained in May 2017. Last reported lipids from March 2020 with an LDL of 101, HDL 39, and triglycerides of 201. Repeat values from December 2020 reveal an LDL of 98, HDL 44, triglycerides 150. On return today she admits that she has not exercised as regularly as in the past [the fitness center is closed]. She has however worked hard in the garden and occasionally walks with no change in exercise tolerance, and no chest discomfort. Her pressure normally runs in the 120s. She has been vaccinated for COVID-19. PHYSICAL EXAM  On exam she carries 167 pounds in a 5 foot 5 inch frame. Pressure is 132/70 with pulse of 64. EOMs full, sclerae and conjunctiva normal. PERRLA. Mask in place. Trachea midline with no neck masses. Assessment of internal jugular veins reveals no elevation of central venous pressure at 45 degrees. Carotid pulses normal without delay or bruit. Thyroid normal to palpation. Chest exam reveals normal respiratory effort, no abnormal breath sounds and normal expiratory phase. No skin lesions seen. PMI normal. S1, S2 normal without murmur or geraldine or click. Normal bowel sounds without palpable mass or bruit. No clubbing or acrocyanosis. No significant lower extremity edema or signs of venous insufficiency. General motor strength appears to be within normal limits. Normal range of motion with normal gait. Alert, oriented x 3, memory and cognition normal as reflected by history and conversation. EKG reveals a sinus rhythm without abnormalities. ASSESSMENT/PLAN:   1. Coronary disease risk -several risk factors as noted above. No symptoms in the face of exercise/physical activity. She is well aware of the need to contact us should she have symptoms arise  2.   Hypertension -acceptable coverage  3. Dyslipidemia -adequately controlled  4.   Pandemic response -appropriate/vaccinated

## 2021-05-18 RX ORDER — LEVOTHYROXINE SODIUM 0.05 MG/1
TABLET ORAL
Qty: 90 TABLET | Refills: 0 | Status: SHIPPED | OUTPATIENT
Start: 2021-05-18 | End: 2021-09-02

## 2021-05-18 RX ORDER — LOSARTAN POTASSIUM 100 MG/1
TABLET ORAL
Qty: 90 TABLET | Refills: 0 | Status: SHIPPED | OUTPATIENT
Start: 2021-05-18 | End: 2021-09-02

## 2021-05-18 RX ORDER — INDAPAMIDE 2.5 MG/1
TABLET, FILM COATED ORAL
Qty: 90 TABLET | Refills: 0 | Status: SHIPPED | OUTPATIENT
Start: 2021-05-18 | End: 2021-09-02

## 2021-05-18 RX ORDER — CLONIDINE HYDROCHLORIDE 0.2 MG/1
TABLET ORAL
Qty: 90 TABLET | Refills: 0 | Status: SHIPPED | OUTPATIENT
Start: 2021-05-18 | End: 2021-09-02

## 2021-05-18 RX ORDER — CLONIDINE HYDROCHLORIDE 0.1 MG/1
TABLET ORAL
Qty: 90 TABLET | Refills: 0 | Status: SHIPPED | OUTPATIENT
Start: 2021-05-18 | End: 2021-09-02

## 2021-05-18 RX ORDER — AMLODIPINE BESYLATE 10 MG/1
TABLET ORAL
Qty: 90 TABLET | Refills: 0 | Status: SHIPPED | OUTPATIENT
Start: 2021-05-18 | End: 2021-09-02

## 2021-06-11 ENCOUNTER — OFFICE VISIT (OUTPATIENT)
Dept: FAMILY MEDICINE CLINIC | Facility: CLINIC | Age: 71
End: 2021-06-11

## 2021-06-11 VITALS
SYSTOLIC BLOOD PRESSURE: 128 MMHG | BODY MASS INDEX: 27.49 KG/M2 | WEIGHT: 165 LBS | DIASTOLIC BLOOD PRESSURE: 80 MMHG | HEIGHT: 65 IN | OXYGEN SATURATION: 96 % | TEMPERATURE: 97.4 F | HEART RATE: 74 BPM | RESPIRATION RATE: 18 BRPM

## 2021-06-11 DIAGNOSIS — I10 ESSENTIAL HYPERTENSION: ICD-10-CM

## 2021-06-11 DIAGNOSIS — Z12.31 BREAST CANCER SCREENING BY MAMMOGRAM: Primary | ICD-10-CM

## 2021-06-11 DIAGNOSIS — R53.83 FATIGUE, UNSPECIFIED TYPE: ICD-10-CM

## 2021-06-11 DIAGNOSIS — E78.2 MIXED HYPERLIPIDEMIA: ICD-10-CM

## 2021-06-11 DIAGNOSIS — Z00.00 ANNUAL PHYSICAL EXAM: ICD-10-CM

## 2021-06-11 LAB
BILIRUB BLD-MCNC: NEGATIVE MG/DL
CLARITY, POC: CLEAR
COLOR UR: YELLOW
GLUCOSE UR STRIP-MCNC: NEGATIVE MG/DL
KETONES UR QL: NEGATIVE
LEUKOCYTE EST, POC: ABNORMAL
NITRITE UR-MCNC: NEGATIVE MG/ML
PH UR: 7 [PH] (ref 5–8)
PROT UR STRIP-MCNC: NEGATIVE MG/DL
RBC # UR STRIP: ABNORMAL /UL
SP GR UR: 1.01 (ref 1–1.03)
UROBILINOGEN UR QL: NORMAL

## 2021-06-11 PROCEDURE — 1126F AMNT PAIN NOTED NONE PRSNT: CPT | Performed by: FAMILY MEDICINE

## 2021-06-11 PROCEDURE — 1159F MED LIST DOCD IN RCRD: CPT | Performed by: FAMILY MEDICINE

## 2021-06-11 PROCEDURE — G0439 PPPS, SUBSEQ VISIT: HCPCS | Performed by: FAMILY MEDICINE

## 2021-06-11 PROCEDURE — 81003 URINALYSIS AUTO W/O SCOPE: CPT | Performed by: FAMILY MEDICINE

## 2021-06-11 NOTE — PATIENT INSTRUCTIONS
Exercising to Stay Healthy  To become healthy and stay healthy, it is recommended that you do moderate-intensity and vigorous-intensity exercise. You can tell that you are exercising at a moderate intensity if your heart starts beating faster and you start breathing faster but can still hold a conversation. You can tell that you are exercising at a vigorous intensity if you are breathing much harder and faster and cannot hold a conversation while exercising.  Exercising regularly is important. It has many health benefits, such as:  · Improving overall fitness, flexibility, and endurance.  · Increasing bone density.  · Helping with weight control.  · Decreasing body fat.  · Increasing muscle strength.  · Reducing stress and tension.  · Improving overall health.  How often should I exercise?  Choose an activity that you enjoy, and set realistic goals. Your health care provider can help you make an activity plan that works for you.  Exercise regularly as told by your health care provider. This may include:  · Doing strength training two times a week, such as:  ? Lifting weights.  ? Using resistance bands.  ? Push-ups.  ? Sit-ups.  ? Yoga.  · Doing a certain intensity of exercise for a given amount of time. Choose from these options:  ? A total of 150 minutes of moderate-intensity exercise every week.  ? A total of 75 minutes of vigorous-intensity exercise every week.  ? A mix of moderate-intensity and vigorous-intensity exercise every week.  Children, pregnant women, people who have not exercised regularly, people who are overweight, and older adults may need to talk with a health care provider about what activities are safe to do. If you have a medical condition, be sure to talk with your health care provider before you start a new exercise program.  What are some exercise ideas?  Moderate-intensity exercise ideas include:  · Walking 1 mile (1.6 km) in about 15  minutes.  · Biking.  · Hiking.  · Golfing.  · Dancing.  · Water aerobics.  Vigorous-intensity exercise ideas include:  · Walking 4.5 miles (7.2 km) or more in about 1 hour.  · Jogging or running 5 miles (8 km) in about 1 hour.  · Biking 10 miles (16.1 km) or more in about 1 hour.  · Lap swimming.  · Roller-skating or in-line skating.  · Cross-country skiing.  · Vigorous competitive sports, such as football, basketball, and soccer.  · Jumping rope.  · Aerobic dancing.  What are some everyday activities that can help me to get exercise?  · Yard work, such as:  ? Pushing a .  ? Raking and bagging leaves.  · Washing your car.  · Pushing a stroller.  · Shoveling snow.  · Gardening.  · Washing windows or floors.  How can I be more active in my day-to-day activities?  · Use stairs instead of an elevator.  · Take a walk during your lunch break.  · If you drive, park your car farther away from your work or school.  · If you take public transportation, get off one stop early and walk the rest of the way.  · Stand up or walk around during all of your indoor phone calls.  · Get up, stretch, and walk around every 30 minutes throughout the day.  · Enjoy exercise with a friend. Support to continue exercising will help you keep a regular routine of activity.  What guidelines can I follow while exercising?  · Before you start a new exercise program, talk with your health care provider.  · Do not exercise so much that you hurt yourself, feel dizzy, or get very short of breath.  · Wear comfortable clothes and wear shoes with good support.  · Drink plenty of water while you exercise to prevent dehydration or heat stroke.  · Work out until your breathing and your heartbeat get faster.  Where to find more information  · [place] Department of Health and Human Services: www.hhs.gov  · Centers for Disease Control and Prevention (CDC): www.cdc.gov  Summary  · Exercising regularly is important. It will improve your overall fitness,  flexibility, and endurance.  · Regular exercise also will improve your overall health. It can help you control your weight, reduce stress, and improve your bone density.  · Do not exercise so much that you hurt yourself, feel dizzy, or get very short of breath.  · Before you start a new exercise program, talk with your health care provider.  This information is not intended to replace advice given to you by your health care provider. Make sure you discuss any questions you have with your health care provider.  Document Revised: 11/30/2018 Document Reviewed: 11/08/2018  [employer] Patient Education © 2021 [employer]      Fall Prevention in the Home, Adult  Falls can cause injuries and can affect people from all age groups. There are many simple things that you can do to make your home safe and to help prevent falls. Ask for help when making these changes, if needed.  What actions can I take to prevent falls?  General instructions  · Use good lighting in all rooms. Replace any light bulbs that burn out.  · Turn on lights if it is dark. Use night-lights.  · Place frequently used items in easy-to-reach places. Lower the shelves around your home if necessary.  · Set up furniture so that there are clear paths around it. Avoid moving your furniture around.  · Remove throw rugs and other tripping hazards from the floor.  · Avoid walking on wet floors.  · Fix any uneven floor surfaces.  · Add color or contrast paint or tape to grab bars and handrails in your home. Place contrasting color strips on the first and last steps of stairways.  · When you use a stepladder, make sure that it is completely opened and that the sides are firmly locked. Have someone hold the ladder while you are using it. Do not climb a closed stepladder.  · Be aware of any and all pets.  What can I do in the bathroom?         · Keep the floor dry. Immediately clean up any water that spills onto the floor.  · Remove soap buildup in the tub or shower on  a regular basis.  · Use non-skid mats or decals on the floor of the tub or shower.  · Attach bath mats securely with double-sided, non-slip rug tape.  · If you need to sit down while you are in the shower, use a plastic, non-slip stool.  · Install grab bars by the toilet and in the tub and shower. Do not use towel bars as grab bars.  What can I do in the bedroom?  · Make sure that a bedside light is easy to reach.  · Do not use oversized bedding that drapes onto the floor.  · Have a firm chair that has side arms to use for getting dressed.  What can I do in the kitchen?  · Clean up any spills right away.  · If you need to reach for something above you, use a sturdy step stool that has a grab bar.  · Keep electrical cables out of the way.  · Do not use floor polish or wax that makes floors slippery. If you must use wax, make sure that it is non-skid floor wax.  What can I do in the stairways?  · Do not leave any items on the stairs.  · Make sure that you have a light switch at the top of the stairs and the bottom of the stairs. Have them installed if you do not have them.  · Make sure that there are handrails on both sides of the stairs. Fix handrails that are broken or loose. Make sure that handrails are as long as the stairways.  · Install non-slip stair treads on all stairs in your home.  · Avoid having throw rugs at the top or bottom of stairways, or secure the rugs with carpet tape to prevent them from moving.  · Choose a carpet design that does not hide the edge of steps on the stairway.  · Check any carpeting to make sure that it is firmly attached to the stairs. Fix any carpet that is loose or worn.  What can I do on the outside of my home?  · Use bright outdoor lighting.  · Regularly repair the edges of walkways and driveways and fix any cracks.  · Remove high doorway thresholds.  · Trim any shrubbery on the main path into your home.  · Regularly check that handrails are securely fastened and in good repair.  Both sides of any steps should have handrails.  · Install guardrails along the edges of any raised decks or porches.  · Clear walkways of debris and clutter, including tools and rocks.  · Have leaves, snow, and ice cleared regularly.  · Use sand or salt on walkways during winter months.  · In the garage, clean up any spills right away, including grease or oil spills.  What other actions can I take?  · Wear closed-toe shoes that fit well and support your feet. Wear shoes that have rubber soles or low heels.  · Use mobility aids as needed, such as canes, walkers, scooters, and crutches.  · Review your medicines with your health care provider. Some medicines can cause dizziness or changes in blood pressure, which increase your risk of falling.  Talk with your health care provider about other ways that you can decrease your risk of falls. This may include working with a physical therapist or  to improve your strength, balance, and endurance.  Where to find more information  · Centers for Disease Control and Prevention, [employer]: https://www.cdc.gov  · National [place] [employer] Aging: https://xl2sftd.adán.nih.gov  Contact a health care provider if:  · You are afraid of falling at home.  · You feel weak, drowsy, or dizzy at home.  · You fall at home.  Summary  · There are many simple things that you can do to make your home safe and to help prevent falls.  · Ways to make your home safe include removing tripping hazards and installing grab bars in the bathroom.  · Ask for help when making these changes in your home.  This information is not intended to replace advice given to you by your health care provider. Make sure you discuss any questions you have with your health care provider.  Document Revised: 11/30/2018 Document Reviewed: 08/02/2018  [employer] Patient Education © 2021 [employer]    Medicare Wellness  Personal Prevention Plan of Service     Date of Office Visit:  06/11/2021  Encounter Provider:  Juan Luis Pizarro  MD Andrade  Place of Service:  [facility]  Patient Name: Naina Manning  :  1950    As part of the Medicare Wellness portion of your visit today, we are providing you with this personalized preventive plan of services (PPPS). This plan is based upon recommendations of the [place] Preventive Services Task Force (USPSTF) and the Advisory Committee on Immunization Practices (ACIP).    This lists the preventive care services that should be considered, and provides dates of when you are due. Items listed as completed are up-to-date and do not require any further intervention.    Health Maintenance   Topic Date Due   • MAMMOGRAM  2021   • Pneumococcal Vaccine 65+ (2 of 2 - PPSV23) 2022 (Originally 2018)   • INFLUENZA VACCINE  2021   • LIPID PANEL  12/10/2021   • DXA SCAN  06/10/2022   • ANNUAL WELLNESS VISIT  2022   • PAP SMEAR  2023   • TDAP/TD VACCINES (2 - Td or Tdap) 2023   • COLORECTAL CANCER SCREENING  10/04/2026   • HEPATITIS C SCREENING  Completed   • COVID-19 Vaccine  Completed   • ZOSTER VACCINE  Completed       No orders of the defined types were placed in this encounter.      Return in 6 months (on 2021).

## 2021-06-11 NOTE — PROGRESS NOTES
The ABCs of the Annual Wellness Visit  Subsequent Medicare Wellness Visit    Chief Complaint   Patient presents with   • Medicare Wellness-subsequent     Fasting with pelvic       Subjective   History of Present Illness:  Naina Manning is a 71 y.o. female who presents for a Subsequent Medicare Wellness Visit.    HEALTH RISK ASSESSMENT    Recent Hospitalizations:  No hospitalization(s) within the last year.    Current Medical Providers:  Patient Care Team:  Juan Luis Zafar MD as PCP - General (Family Medicine)  Emeka Carranza MD as Consulting Physician (Cardiology)  Krunal Garner OD (Optometry)  Marie Malone (Internal Medicine)    Smoking Status:  Social History     Tobacco Use   Smoking Status Never Smoker   Smokeless Tobacco Never Used       Alcohol Consumption:  Social History     Substance and Sexual Activity   Alcohol Use Yes    Comment: occ       Depression Screen:   PHQ-2/PHQ-9 Depression Screening 6/11/2021   Little interest or pleasure in doing things 0   Feeling down, depressed, or hopeless 0   Trouble falling or staying asleep, or sleeping too much -   Feeling tired or having little energy -   Poor appetite or overeating -   Feeling bad about yourself - or that you are a failure or have let yourself or your family down -   Trouble concentrating on things, such as reading the newspaper or watching television -   Moving or speaking so slowly that other people could have noticed. Or the opposite - being so fidgety or restless that you have been moving around a lot more than usual -   Thoughts that you would be better off dead, or of hurting yourself in some way -   Total Score 0   If you checked off any problems, how difficult have these problems made it for you to do your work, take care of things at home, or get along with other people? -       Fall Risk Screen:  STEADI Fall Risk Assessment was completed, and patient is at LOW risk for falls.Assessment completed on:6/11/2021    Health  Habits and Functional and Cognitive Screening:  Functional & Cognitive Status 6/11/2021   Do you have difficulty preparing food and eating? No   Do you have difficulty bathing yourself, getting dressed or grooming yourself? No   Do you have difficulty using the toilet? No   Do you have difficulty moving around from place to place? No   Do you have trouble with steps or getting out of a bed or a chair? No   Current Diet Well Balanced Diet   Dental Exam Up to date   Eye Exam Up to date   Exercise (times per week) 4 times per week   Current Exercise Activities Include Yard Work   Do you need help using the phone?  No   Are you deaf or do you have serious difficulty hearing?  No   Do you need help with transportation? No   Do you need help shopping? No   Do you need help preparing meals?  No   Do you need help with housework?  No   Do you need help with laundry? No   Do you need help taking your medications? No   Do you need help managing money? No   Do you ever drive or ride in a car without wearing a seat belt? No   Have you felt unusual stress, anger or loneliness in the last month? No   Who do you live with? Spouse   If you need help, do you have trouble finding someone available to you? No   Have you been bothered in the last four weeks by sexual problems? No   Do you have difficulty concentrating, remembering or making decisions? No         Does the patient have evidence of cognitive impairment? No    Asprin use counseling:Does not need ASA (and currently is not on it)    Age-appropriate Screening Schedule:  Refer to the list below for future screening recommendations based on patient's age, sex and/or medical conditions. Orders for these recommended tests are listed in the plan section. The patient has been provided with a written plan.    Health Maintenance   Topic Date Due   • MAMMOGRAM  06/09/2021   • INFLUENZA VACCINE  08/01/2021   • LIPID PANEL  12/10/2021   • DXA SCAN  06/10/2022   • PAP SMEAR  06/09/2023    • TDAP/TD VACCINES (2 - Td or Tdap) 07/16/2023   • ZOSTER VACCINE  Completed          The following portions of the patient's history were reviewed and updated as appropriate: allergies, current medications, past family history, past medical history, past social history, past surgical history and problem list.    Outpatient Medications Prior to Visit   Medication Sig Dispense Refill   • acetaminophen (TYLENOL) 500 MG tablet Take 1,000 mg by mouth 2 (Two) Times a Day.     • albuterol sulfate HFA (VENTOLIN HFA) 108 (90 Base) MCG/ACT inhaler Inhale 2 puffs Every 4 (Four) Hours As Needed for Wheezing. 1 inhaler 6   • amLODIPine (NORVASC) 10 MG tablet TAKE 1 TABLET DAILY 90 tablet 0   • cetirizine (zyrTEC) 10 MG tablet Take 10 mg by mouth Daily.     • cholecalciferol (VITAMIN D3) 1000 units tablet Take 1,000 Units by mouth Daily.     • cloNIDine (CATAPRES) 0.1 MG tablet TAKE 1 TABLET EVERY NIGHT 90 tablet 0   • cloNIDine (CATAPRES) 0.2 MG tablet TAKE 1 TABLET EVERY MORNING 90 tablet 0   • estradiol (ESTRACE) 0.1 MG/GM vaginal cream INSERT 2 GM VAGINALLY DAILY 42.5 g 16   • fluticasone (FLONASE) 50 MCG/ACT nasal spray USE 2 SPRAYS IN EACH NOSTRIL DAILY 16 g 11   • indapamide (LOZOL) 2.5 MG tablet TAKE 1 TABLET DAILY 90 tablet 0   • levothyroxine (SYNTHROID, LEVOTHROID) 50 MCG tablet TAKE 1 TABLET EVERY MORNING 90 tablet 0   • losartan (COZAAR) 100 MG tablet TAKE 1 TABLET EVERY MORNING 90 tablet 0   • omeprazole (priLOSEC) 40 MG capsule Take 1 capsule by mouth Daily. 90 capsule 3   • rosuvastatin (CRESTOR) 5 MG tablet TAKE 1 TABLET EVERY NIGHT 90 tablet 1     No facility-administered medications prior to visit.       Patient Active Problem List   Diagnosis   • Body mass index (BMI) of 25.0 to 29.9   • Essential hypertension   • Hypertension   • Hypothyroid   • Hypothyroidism   • Seasonal allergic rhinitis       Advanced Care Planning:  ACP discussion was declined by the patient. Patient has an advance directive in EMR  "which is still valid.     Review of Systems   Respiratory: Negative for shortness of breath.    Cardiovascular: Negative for chest pain and leg swelling.   Gastrointestinal:        Cologuard up-to-date for 1 more year   Genitourinary: Negative for difficulty urinating.   Musculoskeletal: Positive for arthralgias.   All other systems reviewed and are negative.      Compared to one year ago, the patient feels her physical health is the same.  Compared to one year ago, the patient feels her mental health is the same.    Reviewed chart for potential of high risk medication in the elderly: yes  Reviewed chart for potential of harmful drug interactions in the elderly:yes    Objective         Vitals:    06/11/21 0936   BP: 128/80   BP Location: Left arm   Patient Position: Sitting   Cuff Size: Large Adult   Pulse: 74   Resp: 18   Temp: 97.4 °F (36.3 °C)   TempSrc: Temporal   SpO2: 96%   Weight: 74.8 kg (165 lb)   Height: 165.1 cm (65\")   PainSc: 0-No pain       Body mass index is 27.46 kg/m².  Discussed the patient's BMI with her. The BMI is above average; BMI management plan is completed.    Physical Exam  Vitals and nursing note reviewed.   Constitutional:       Appearance: Normal appearance.   HENT:      Right Ear: Tympanic membrane and ear canal normal.      Left Ear: Tympanic membrane and ear canal normal.   Eyes:      Extraocular Movements: Extraocular movements intact.      Pupils: Pupils are equal, round, and reactive to light.   Neck:      Vascular: No carotid bruit.   Cardiovascular:      Rate and Rhythm: Normal rate and regular rhythm.      Pulses: Normal pulses.      Heart sounds: Normal heart sounds.   Pulmonary:      Effort: Pulmonary effort is normal.      Breath sounds: Normal breath sounds.      Comments: Breast no masses noted  Abdominal:      General: Abdomen is flat.      Palpations: Abdomen is soft.   Genitourinary:     General: Normal vulva.      Comments: Cervix present Pap smear not taken uterus " normal size no adnexal masses  Musculoskeletal:      Right lower leg: No edema.      Left lower leg: No edema.   Lymphadenopathy:      Cervical: No cervical adenopathy.   Skin:     General: Skin is warm and dry.   Neurological:      General: No focal deficit present.      Mental Status: She is alert and oriented to person, place, and time.   Psychiatric:         Mood and Affect: Mood normal.         Behavior: Behavior normal.         Thought Content: Thought content normal.         Judgment: Judgment normal.               Assessment/Plan   Medicare Risks and Personalized Health Plan  CMS Preventative Services Quick Reference  Breast Cancer/Mammogram Screening  Fall Risk    The above risks/problems have been discussed with the patient.  Pertinent information has been shared with the patient in the After Visit Summary.  Follow up plans and orders are seen below in the Assessment/Plan Section.    Diagnoses and all orders for this visit:    1. Breast cancer screening by mammogram (Primary)  -     Mammo Screening Digital Tomosynthesis Bilateral With CAD; Future    2. Mixed hyperlipidemia  -     Comprehensive Metabolic Panel  -     Lipid Panel    3. Essential hypertension  -     POC Urinalysis Dipstick, Multipro    4. Fatigue, unspecified type  -     TSH  -     T4, free  -     CBC & Differential    5. Annual physical exam      Follow Up:  Return in 6 months (on 12/11/2021).     An After Visit Summary and PPPS were given to the patient.       Plan above-up-to-date on Covid shots etc.

## 2021-06-12 LAB
ALBUMIN SERPL-MCNC: 4.9 G/DL (ref 3.5–5.2)
ALBUMIN/GLOB SERPL: 1.9 G/DL
ALP SERPL-CCNC: 85 U/L (ref 39–117)
ALT SERPL-CCNC: 18 U/L (ref 1–33)
AST SERPL-CCNC: 18 U/L (ref 1–32)
BASOPHILS # BLD AUTO: 0.05 10*3/MM3 (ref 0–0.2)
BASOPHILS NFR BLD AUTO: 0.7 % (ref 0–1.5)
BILIRUB SERPL-MCNC: 0.3 MG/DL (ref 0–1.2)
BUN SERPL-MCNC: 13 MG/DL (ref 8–23)
BUN/CREAT SERPL: 17.6 (ref 7–25)
CALCIUM SERPL-MCNC: 9.4 MG/DL (ref 8.6–10.5)
CHLORIDE SERPL-SCNC: 96 MMOL/L (ref 98–107)
CHOLEST SERPL-MCNC: 159 MG/DL (ref 0–200)
CO2 SERPL-SCNC: 27.1 MMOL/L (ref 22–29)
CREAT SERPL-MCNC: 0.74 MG/DL (ref 0.57–1)
EOSINOPHIL # BLD AUTO: 0.08 10*3/MM3 (ref 0–0.4)
EOSINOPHIL NFR BLD AUTO: 1.1 % (ref 0.3–6.2)
ERYTHROCYTE [DISTWIDTH] IN BLOOD BY AUTOMATED COUNT: 12.9 % (ref 12.3–15.4)
GLOBULIN SER CALC-MCNC: 2.6 GM/DL
GLUCOSE SERPL-MCNC: 93 MG/DL (ref 65–99)
HCT VFR BLD AUTO: 45.7 % (ref 34–46.6)
HDLC SERPL-MCNC: 40 MG/DL (ref 40–60)
HGB BLD-MCNC: 15.2 G/DL (ref 12–15.9)
IMM GRANULOCYTES # BLD AUTO: 0.02 10*3/MM3 (ref 0–0.05)
IMM GRANULOCYTES NFR BLD AUTO: 0.3 % (ref 0–0.5)
LDLC SERPL CALC-MCNC: 90 MG/DL (ref 0–100)
LYMPHOCYTES # BLD AUTO: 1.7 10*3/MM3 (ref 0.7–3.1)
LYMPHOCYTES NFR BLD AUTO: 23.1 % (ref 19.6–45.3)
MCH RBC QN AUTO: 30.8 PG (ref 26.6–33)
MCHC RBC AUTO-ENTMCNC: 33.3 G/DL (ref 31.5–35.7)
MCV RBC AUTO: 92.5 FL (ref 79–97)
MONOCYTES # BLD AUTO: 0.85 10*3/MM3 (ref 0.1–0.9)
MONOCYTES NFR BLD AUTO: 11.6 % (ref 5–12)
NEUTROPHILS # BLD AUTO: 4.65 10*3/MM3 (ref 1.7–7)
NEUTROPHILS NFR BLD AUTO: 63.2 % (ref 42.7–76)
NRBC BLD AUTO-RTO: 0 /100 WBC (ref 0–0.2)
PLATELET # BLD AUTO: 446 10*3/MM3 (ref 140–450)
POTASSIUM SERPL-SCNC: 4.4 MMOL/L (ref 3.5–5.2)
PROT SERPL-MCNC: 7.5 G/DL (ref 6–8.5)
RBC # BLD AUTO: 4.94 10*6/MM3 (ref 3.77–5.28)
SODIUM SERPL-SCNC: 137 MMOL/L (ref 136–145)
T4 FREE SERPL-MCNC: 1.25 NG/DL (ref 0.93–1.7)
TRIGL SERPL-MCNC: 169 MG/DL (ref 0–150)
TSH SERPL DL<=0.005 MIU/L-ACNC: 1.68 UIU/ML (ref 0.27–4.2)
VLDLC SERPL CALC-MCNC: 29 MG/DL (ref 5–40)
WBC # BLD AUTO: 7.35 10*3/MM3 (ref 3.4–10.8)

## 2021-09-02 RX ORDER — CLONIDINE HYDROCHLORIDE 0.2 MG/1
TABLET ORAL
Qty: 90 TABLET | Refills: 3 | Status: SHIPPED | OUTPATIENT
Start: 2021-09-02 | End: 2022-08-23

## 2021-09-02 RX ORDER — CLONIDINE HYDROCHLORIDE 0.1 MG/1
TABLET ORAL
Qty: 90 TABLET | Refills: 3 | Status: SHIPPED | OUTPATIENT
Start: 2021-09-02 | End: 2022-08-23

## 2021-09-02 RX ORDER — INDAPAMIDE 2.5 MG/1
TABLET, FILM COATED ORAL
Qty: 90 TABLET | Refills: 3 | Status: SHIPPED | OUTPATIENT
Start: 2021-09-02 | End: 2022-08-23

## 2021-09-02 RX ORDER — AMLODIPINE BESYLATE 10 MG/1
TABLET ORAL
Qty: 90 TABLET | Refills: 3 | Status: SHIPPED | OUTPATIENT
Start: 2021-09-02 | End: 2022-08-23

## 2021-09-02 RX ORDER — ROSUVASTATIN CALCIUM 5 MG/1
TABLET, COATED ORAL
Qty: 90 TABLET | Refills: 3 | Status: SHIPPED | OUTPATIENT
Start: 2021-09-02 | End: 2022-01-28 | Stop reason: DRUGHIGH

## 2021-09-02 RX ORDER — LEVOTHYROXINE SODIUM 0.05 MG/1
TABLET ORAL
Qty: 90 TABLET | Refills: 3 | Status: SHIPPED | OUTPATIENT
Start: 2021-09-02 | End: 2022-08-23

## 2021-09-02 RX ORDER — LOSARTAN POTASSIUM 100 MG/1
TABLET ORAL
Qty: 90 TABLET | Refills: 3 | Status: SHIPPED | OUTPATIENT
Start: 2021-09-02 | End: 2022-08-23

## 2021-09-02 NOTE — TELEPHONE ENCOUNTER
Rx Refill Note  Requested Prescriptions     Pending Prescriptions Disp Refills   • cloNIDine (CATAPRES) 0.2 MG tablet [Pharmacy Med Name: CLONIDINE HCL TABS 0.2MG] 90 tablet 3     Sig: TAKE 1 TABLET EVERY MORNING   • cloNIDine (CATAPRES) 0.1 MG tablet [Pharmacy Med Name: CLONIDINE HCL TABS 0.1MG] 90 tablet 3     Sig: TAKE 1 TABLET EVERY NIGHT   • losartan (COZAAR) 100 MG tablet [Pharmacy Med Name: LOSARTAN TABS 100MG] 90 tablet 3     Sig: TAKE 1 TABLET EVERY MORNING   • indapamide (LOZOL) 2.5 MG tablet [Pharmacy Med Name: INDAPAMIDE TABS 2.5MG] 90 tablet 3     Sig: TAKE 1 TABLET DAILY   • amLODIPine (NORVASC) 10 MG tablet [Pharmacy Med Name: AMLODIPINE BESYLATE TABS 10MG] 90 tablet 3     Sig: TAKE 1 TABLET DAILY   • levothyroxine (SYNTHROID, LEVOTHROID) 50 MCG tablet [Pharmacy Med Name: L-THYROXINE (SYNTHROID) TABS 50MCG] 90 tablet 3     Sig: TAKE 1 TABLET EVERY MORNING   • rosuvastatin (CRESTOR) 5 MG tablet [Pharmacy Med Name: ROSUVASTATIN TABS 5MG] 90 tablet 3     Sig: TAKE 1 TABLET EVERY NIGHT      Last office visit with prescribing clinician: 6/11/2021      Next office visit with prescribing clinician: 12/13/2021            Kaycee Mora MA  09/02/21, 08:29 CDT

## 2021-10-04 ENCOUNTER — FLU SHOT (OUTPATIENT)
Dept: FAMILY MEDICINE CLINIC | Facility: CLINIC | Age: 71
End: 2021-10-04

## 2021-10-04 DIAGNOSIS — Z23 NEED FOR INFLUENZA VACCINATION: Primary | ICD-10-CM

## 2021-10-04 PROCEDURE — G0008 ADMIN INFLUENZA VIRUS VAC: HCPCS | Performed by: FAMILY MEDICINE

## 2021-10-04 PROCEDURE — 90662 IIV NO PRSV INCREASED AG IM: CPT | Performed by: FAMILY MEDICINE

## 2021-10-20 RX ORDER — ESTRADIOL 0.1 MG/G
CREAM VAGINAL
Qty: 42.5 G | Refills: 16 | Status: SHIPPED | OUTPATIENT
Start: 2021-10-20 | End: 2022-02-15

## 2021-10-20 NOTE — TELEPHONE ENCOUNTER
Rx Refill Note  Requested Prescriptions     Pending Prescriptions Disp Refills   • estradiol (ESTRACE) 0.1 MG/GM vaginal cream [Pharmacy Med Name: ESTRADIOL VAG CRM W/APPL 42.5GM 0.01%] 42.5 g 16     Sig: INSERT 2 GRAMS VAGINALLY DAILY      Last office visit with prescribing clinician: 6/11/21     Next office visit with prescribing clinician: 12/13/21            Kaycee Mora MA  10/20/21, 07:28 CDT

## 2022-01-27 ENCOUNTER — OFFICE VISIT (OUTPATIENT)
Dept: FAMILY MEDICINE CLINIC | Facility: CLINIC | Age: 72
End: 2022-01-27

## 2022-01-27 VITALS
TEMPERATURE: 98 F | RESPIRATION RATE: 18 BRPM | BODY MASS INDEX: 28.66 KG/M2 | DIASTOLIC BLOOD PRESSURE: 90 MMHG | HEIGHT: 65 IN | OXYGEN SATURATION: 97 % | WEIGHT: 172 LBS | SYSTOLIC BLOOD PRESSURE: 136 MMHG | HEART RATE: 86 BPM

## 2022-01-27 DIAGNOSIS — E78.2 MIXED HYPERLIPIDEMIA: Primary | ICD-10-CM

## 2022-01-27 PROCEDURE — 99213 OFFICE O/P EST LOW 20 MIN: CPT | Performed by: FAMILY MEDICINE

## 2022-01-27 NOTE — PROGRESS NOTES
Subjective   Naina Manning is a 72 y.o. female.     72-year-old female with history of hypertension      The following portions of the patient's history were reviewed and updated as appropriate: allergies, current medications, past family history, past medical history, past social history, past surgical history and problem list.    Review of Systems   Respiratory: Negative for shortness of breath.    Cardiovascular: Negative for chest pain and leg swelling.        Seeing cardiologist in May-has noticed occasional skipped beats-we will monitor-does not occur with exercise   Musculoskeletal: Positive for arthralgias.       Objective   Physical Exam  Vitals and nursing note reviewed.   Constitutional:       Appearance: Normal appearance.   Eyes:      Extraocular Movements: Extraocular movements intact.      Pupils: Pupils are equal, round, and reactive to light.   Cardiovascular:      Rate and Rhythm: Normal rate and regular rhythm.      Pulses: Normal pulses.      Heart sounds: Normal heart sounds.   Pulmonary:      Effort: Pulmonary effort is normal.      Breath sounds: Normal breath sounds.   Skin:     General: Skin is dry.   Neurological:      General: No focal deficit present.      Mental Status: She is alert and oriented to person, place, and time.   Psychiatric:         Mood and Affect: Mood normal.         Behavior: Behavior normal.         Thought Content: Thought content normal.         Judgment: Judgment normal.         Assessment/Plan   Diagnoses and all orders for this visit:    1. Mixed hyperlipidemia (Primary)  -     Comprehensive Metabolic Panel  -     Lipid Panel         Plan above-up-to-date on immunizations

## 2022-01-28 DIAGNOSIS — R73.9 ELEVATED BLOOD SUGAR: ICD-10-CM

## 2022-01-28 DIAGNOSIS — E78.2 MIXED HYPERLIPIDEMIA: Primary | ICD-10-CM

## 2022-01-28 LAB
ALBUMIN SERPL-MCNC: 4.8 G/DL (ref 3.5–5.2)
ALBUMIN/GLOB SERPL: 1.6 G/DL
ALP SERPL-CCNC: 80 U/L (ref 39–117)
ALT SERPL-CCNC: 14 U/L (ref 1–33)
AST SERPL-CCNC: 13 U/L (ref 1–32)
BILIRUB SERPL-MCNC: 0.4 MG/DL (ref 0–1.2)
BUN SERPL-MCNC: 12 MG/DL (ref 8–23)
BUN/CREAT SERPL: 16.9 (ref 7–25)
CALCIUM SERPL-MCNC: 10.1 MG/DL (ref 8.6–10.5)
CHLORIDE SERPL-SCNC: 98 MMOL/L (ref 98–107)
CHOLEST SERPL-MCNC: 181 MG/DL (ref 0–200)
CO2 SERPL-SCNC: 27.9 MMOL/L (ref 22–29)
CREAT SERPL-MCNC: 0.71 MG/DL (ref 0.57–1)
GLOBULIN SER CALC-MCNC: 3 GM/DL
GLUCOSE SERPL-MCNC: 113 MG/DL (ref 65–99)
HDLC SERPL-MCNC: 47 MG/DL (ref 40–60)
LDLC SERPL CALC-MCNC: 109 MG/DL (ref 0–100)
POTASSIUM SERPL-SCNC: 4.1 MMOL/L (ref 3.5–5.2)
PROT SERPL-MCNC: 7.8 G/DL (ref 6–8.5)
SODIUM SERPL-SCNC: 137 MMOL/L (ref 136–145)
TRIGL SERPL-MCNC: 142 MG/DL (ref 0–150)
VLDLC SERPL CALC-MCNC: 25 MG/DL (ref 5–40)

## 2022-01-28 RX ORDER — ROSUVASTATIN CALCIUM 10 MG/1
10 TABLET, COATED ORAL NIGHTLY
Qty: 90 TABLET | Refills: 1 | Status: SHIPPED | OUTPATIENT
Start: 2022-01-28 | End: 2022-07-08

## 2022-02-15 DIAGNOSIS — N95.2 ATROPHY OF VAGINA: Primary | ICD-10-CM

## 2022-02-15 RX ORDER — ESTRADIOL 10 UG/1
1 INSERT VAGINAL 2 TIMES WEEKLY
Qty: 24 TABLET | Refills: 1 | Status: SHIPPED | OUTPATIENT
Start: 2022-02-17 | End: 2022-03-02

## 2022-02-15 NOTE — TELEPHONE ENCOUNTER
Pt brought in note regarding estradiol--scanned in Epic.     Reached out to [facility] [phone] and Spoke with Clyde-she transferred call since patient has medicare part d coverage. Spoke with Jenny who could not provide any information--we could complete tier exception over the phone ([phone]-tier exception).  Contacted patient afte dr whitfiedl suggested having her try vagifem 10mcg (1) tiw. Pt was agreeable.

## 2022-02-21 DIAGNOSIS — F41.9 ANXIETY: Primary | ICD-10-CM

## 2022-02-21 RX ORDER — HYDROXYZINE HYDROCHLORIDE 25 MG/1
TABLET, FILM COATED ORAL
Qty: 60 TABLET | Refills: 0 | Status: SHIPPED | OUTPATIENT
Start: 2022-02-21 | End: 2022-09-30 | Stop reason: SDUPTHER

## 2022-02-21 NOTE — TELEPHONE ENCOUNTER
Used to take hydroxyzine 25mg -it has been a while since she has taken for anxiety--needs a refill.    [facility]

## 2022-02-23 ENCOUNTER — TELEPHONE (OUTPATIENT)
Dept: FAMILY MEDICINE CLINIC | Facility: CLINIC | Age: 72
End: 2022-02-23

## 2022-03-02 ENCOUNTER — OFFICE VISIT (OUTPATIENT)
Dept: FAMILY MEDICINE CLINIC | Facility: CLINIC | Age: 72
End: 2022-03-02

## 2022-03-02 VITALS
SYSTOLIC BLOOD PRESSURE: 146 MMHG | DIASTOLIC BLOOD PRESSURE: 80 MMHG | HEART RATE: 75 BPM | TEMPERATURE: 98.2 F | OXYGEN SATURATION: 97 % | WEIGHT: 170.2 LBS | BODY MASS INDEX: 28.36 KG/M2 | RESPIRATION RATE: 18 BRPM | HEIGHT: 65 IN

## 2022-03-02 DIAGNOSIS — M79.644 FINGER PAIN, RIGHT: Primary | ICD-10-CM

## 2022-03-02 PROCEDURE — 99212 OFFICE O/P EST SF 10 MIN: CPT | Performed by: FAMILY MEDICINE

## 2022-03-02 NOTE — PROGRESS NOTES
The patient dropped a rock on her right ring finger-tendons normal superficialis and profundus-last digit is trkyugte-iovobdjfl-mbyzhmgigre treatment ice elevation splint

## 2022-03-07 ENCOUNTER — TELEPHONE (OUTPATIENT)
Dept: CARDIOLOGY CLINIC | Age: 72
End: 2022-03-07

## 2022-03-07 DIAGNOSIS — R00.2 PALPITATIONS: Primary | ICD-10-CM

## 2022-03-07 PROCEDURE — 93246 EXT ECG>7D<15D RECORDING: CPT | Performed by: NURSE PRACTITIONER

## 2022-03-07 NOTE — TELEPHONE ENCOUNTER
Kristina Hoskins,   Can you go ahead and mail her a [employer] cardiac monitor for 2 weeks? Can you enter the order for me?   Thanks, [employer]

## 2022-03-07 NOTE — TELEPHONE ENCOUNTER
Patient states she saw PCP yesterday and he advised she call office. She states she has been having flutters and some rapid HR the past month. She denies any chest pain or sob with them, just a little more fatigue than usual. She doesn't know what her HR is. Has appt with Allie Gustavo in May, didn't know if he would want a monitor placed before her appt or see her sooner.

## 2022-03-29 ENCOUNTER — TELEPHONE (OUTPATIENT)
Dept: CARDIOTHORACIC SURGERY | Age: 72
End: 2022-03-29

## 2022-03-29 NOTE — TELEPHONE ENCOUNTER
Nestoro monitor reviewed. Analysis time 13 days and 16 hours  Underlying rhythm normal sinus rhythm  Average heart rate 72 bpm  Minimum heart rate 42 bpm at 10:42 AM  Maximum heart rate 184 bpm with an 8 beat run of SVT  1 run VT 12 beats in duration  19 SVT runs longest 10 beats  No pauses, second degree or complete heart block. Rare PAC and PVC  4 triggers and 4 diary entries associated rhythm normal sinus rhythm and PACs.   Sabi Regan, SHIMON

## 2022-04-11 RX ORDER — FLUTICASONE PROPIONATE 50 MCG
SPRAY, SUSPENSION (ML) NASAL
Qty: 16 G | Refills: 11 | Status: SHIPPED | OUTPATIENT
Start: 2022-04-11

## 2022-04-11 RX ORDER — OMEPRAZOLE 40 MG/1
CAPSULE, DELAYED RELEASE ORAL
Qty: 90 CAPSULE | Refills: 3 | Status: SHIPPED | OUTPATIENT
Start: 2022-04-11

## 2022-04-11 NOTE — TELEPHONE ENCOUNTER
Rx Refill Note  Requested Prescriptions     Pending Prescriptions Disp Refills   • omeprazole (priLOSEC) 40 MG capsule [Pharmacy Med Name: OMEPRAZOLE DR CAPS 40MG] 90 capsule 3     Sig: TAKE 1 CAPSULE DAILY      Last office visit with prescribing clinician: 3/2/22  Next office visit with prescribing clinician:6/14/22           Kaycee Mora MA  04/11/22, 09:17 CDT

## 2022-04-11 NOTE — TELEPHONE ENCOUNTER
Rx Refill Note  Requested Prescriptions     Pending Prescriptions Disp Refills   • fluticasone (FLONASE) 50 MCG/ACT nasal spray [Pharmacy Med Name: FLUTICASONE PROP NASAL SPRAY 16GM 50MCG] 16 g 11     Sig: USE 2 SPRAYS IN EACH NOSTRIL DAILY      Last office visit with prescribing clinician: 3/2/2022      Next office visit with prescribing clinician: 6/14/2022            Kaycee Mora MA  04/11/22, 09:40 CDT

## 2022-04-29 ENCOUNTER — TELEPHONE (OUTPATIENT)
Dept: FAMILY MEDICINE CLINIC | Facility: CLINIC | Age: 72
End: 2022-04-29

## 2022-04-29 DIAGNOSIS — Z91.89 AT INCREASED RISK FOR CARDIOVASCULAR DISEASE: ICD-10-CM

## 2022-04-29 DIAGNOSIS — I10 ESSENTIAL HYPERTENSION: Primary | ICD-10-CM

## 2022-05-02 DIAGNOSIS — R73.03 PREDIABETES: Primary | ICD-10-CM

## 2022-06-14 ENCOUNTER — OFFICE VISIT (OUTPATIENT)
Dept: FAMILY MEDICINE CLINIC | Facility: CLINIC | Age: 72
End: 2022-06-14

## 2022-06-14 VITALS
HEIGHT: 65 IN | HEART RATE: 91 BPM | TEMPERATURE: 97.8 F | SYSTOLIC BLOOD PRESSURE: 139 MMHG | OXYGEN SATURATION: 99 % | BODY MASS INDEX: 27.46 KG/M2 | DIASTOLIC BLOOD PRESSURE: 92 MMHG | WEIGHT: 164.8 LBS

## 2022-06-14 DIAGNOSIS — I10 ESSENTIAL HYPERTENSION: Primary | ICD-10-CM

## 2022-06-14 DIAGNOSIS — R53.83 FATIGUE, UNSPECIFIED TYPE: ICD-10-CM

## 2022-06-14 DIAGNOSIS — Z12.11 SCREENING FOR COLORECTAL CANCER: ICD-10-CM

## 2022-06-14 DIAGNOSIS — N95.1 POST MENOPAUSAL SYNDROME: ICD-10-CM

## 2022-06-14 DIAGNOSIS — E78.2 MIXED HYPERLIPIDEMIA: ICD-10-CM

## 2022-06-14 DIAGNOSIS — R73.9 ELEVATED BLOOD SUGAR: ICD-10-CM

## 2022-06-14 DIAGNOSIS — Z12.31 BREAST CANCER SCREENING BY MAMMOGRAM: ICD-10-CM

## 2022-06-14 DIAGNOSIS — R41.3 MEMORY LOSS: ICD-10-CM

## 2022-06-14 DIAGNOSIS — Z12.12 SCREENING FOR COLORECTAL CANCER: ICD-10-CM

## 2022-06-14 DIAGNOSIS — Z12.4 SCREENING FOR MALIGNANT NEOPLASM OF THE CERVIX: ICD-10-CM

## 2022-06-14 DIAGNOSIS — E55.9 VITAMIN D DEFICIENCY: ICD-10-CM

## 2022-06-14 DIAGNOSIS — Z00.00 MEDICARE ANNUAL WELLNESS VISIT, SUBSEQUENT: ICD-10-CM

## 2022-06-14 LAB
BILIRUB BLD-MCNC: NEGATIVE MG/DL
CLARITY, POC: CLEAR
COLOR UR: YELLOW
GLUCOSE UR STRIP-MCNC: NEGATIVE MG/DL
KETONES UR QL: NEGATIVE
LEUKOCYTE EST, POC: ABNORMAL
NITRITE UR-MCNC: NEGATIVE MG/ML
PH UR: 8.5 [PH] (ref 5–8)
PROT UR STRIP-MCNC: NEGATIVE MG/DL
RBC # UR STRIP: NEGATIVE /UL
SP GR UR: 1.01 (ref 1–1.03)
UROBILINOGEN UR QL: NORMAL

## 2022-06-14 PROCEDURE — 1126F AMNT PAIN NOTED NONE PRSNT: CPT | Performed by: FAMILY MEDICINE

## 2022-06-14 PROCEDURE — G0439 PPPS, SUBSEQ VISIT: HCPCS | Performed by: FAMILY MEDICINE

## 2022-06-14 PROCEDURE — 81003 URINALYSIS AUTO W/O SCOPE: CPT | Performed by: FAMILY MEDICINE

## 2022-06-14 PROCEDURE — 1170F FXNL STATUS ASSESSED: CPT | Performed by: FAMILY MEDICINE

## 2022-06-14 PROCEDURE — 1159F MED LIST DOCD IN RCRD: CPT | Performed by: FAMILY MEDICINE

## 2022-06-14 RX ORDER — ESTRADIOL 0.1 MG/G
CREAM VAGINAL
COMMUNITY
Start: 2022-04-11 | End: 2022-10-04

## 2022-06-14 NOTE — PROGRESS NOTES
The ABCs of the Annual Wellness Visit  Subsequent Medicare Wellness Visit    Chief Complaint   Patient presents with   • Medicare Wellness-subsequent     Fasting, with pap      Subjective    History of Present Illness:  Naina Manning is a 72 y.o. female who presents for a Subsequent Medicare Wellness Visit.    The following portions of the patient's history were reviewed and   updated as appropriate: allergies, current medications, past family history, past medical history, past social history, past surgical history and problem list.    Compared to one year ago, the patient feels her physical   health is the same.    Compared to one year ago, the patient feels her mental   health is the same.    Recent Hospitalizations:  She was not admitted to the hospital during the last year.       Current Medical Providers:  Patient Care Team:  Juan Luis Zafar MD as PCP - General (Family Medicine)  Emeka Carranza MD as Consulting Physician (Cardiology)  Krunal Garner OD (Optometry)  Marie Malone (Internal Medicine)    Outpatient Medications Prior to Visit   Medication Sig Dispense Refill   • acetaminophen (TYLENOL) 500 MG tablet Take 1,000 mg by mouth 2 (Two) Times a Day.     • amLODIPine (NORVASC) 10 MG tablet TAKE 1 TABLET DAILY 90 tablet 3   • cholecalciferol (VITAMIN D3) 1000 units tablet Take 1,000 Units by mouth Daily.     • cloNIDine (CATAPRES) 0.1 MG tablet TAKE 1 TABLET EVERY NIGHT 90 tablet 3   • cloNIDine (CATAPRES) 0.2 MG tablet TAKE 1 TABLET EVERY MORNING 90 tablet 3   • fluticasone (FLONASE) 50 MCG/ACT nasal spray USE 2 SPRAYS IN EACH NOSTRIL DAILY 16 g 11   • hydrOXYzine (ATARAX) 25 MG tablet Take 1-2 tablets every 4-6 hours as needed for anxiety. 60 tablet 0   • indapamide (LOZOL) 2.5 MG tablet TAKE 1 TABLET DAILY 90 tablet 3   • levothyroxine (SYNTHROID, LEVOTHROID) 50 MCG tablet TAKE 1 TABLET EVERY MORNING 90 tablet 3   • losartan (COZAAR) 100 MG tablet TAKE 1 TABLET EVERY MORNING 90 tablet 3  "  • metFORMIN (Glucophage) 500 MG tablet Take 1 tablet by mouth 2 (Two) Times a Day With Meals. 180 tablet 3   • omeprazole (priLOSEC) 40 MG capsule TAKE 1 CAPSULE DAILY 90 capsule 3   • rosuvastatin (Crestor) 10 MG tablet Take 1 tablet by mouth Every Night. 90 tablet 1   • estradiol (ESTRACE) 0.1 MG/GM vaginal cream      • albuterol sulfate HFA (VENTOLIN HFA) 108 (90 Base) MCG/ACT inhaler Inhale 2 puffs Every 4 (Four) Hours As Needed for Wheezing. 1 inhaler 6   • cetirizine (zyrTEC) 10 MG tablet Take 10 mg by mouth Daily.       No facility-administered medications prior to visit.       No opioid medication identified on active medication list. I have reviewed chart for other potential  high risk medication/s and harmful drug interactions in the elderly.          Aspirin is not on active medication list.  Aspirin use is not indicated based on review of current medical condition/s. Risk of harm outweighs potential benefits.  .    Patient Active Problem List   Diagnosis   • Body mass index (BMI) of 25.0 to 29.9   • Essential hypertension   • Hypertension   • Hypothyroid   • Hypothyroidism   • Seasonal allergic rhinitis     Advance Care Planning  Advance Directive is on file.  ACP discussion was declined by the patient. Patient has an advance directive in EMR which is still valid.     Review of Systems   Respiratory: Negative for shortness of breath.    Cardiovascular: Negative for chest pain and leg swelling.   Gastrointestinal:        Cologuard repeat   Genitourinary: Negative for difficulty urinating.   All other systems reviewed and are negative.       Objective    Vitals:    06/14/22 0749   BP: 139/92   BP Location: Left arm   Patient Position: Sitting   Cuff Size: Adult   Pulse: 91   Temp: 97.8 °F (36.6 °C)   TempSrc: Temporal   SpO2: 99%   Weight: 74.8 kg (164 lb 12.8 oz)   Height: 165.1 cm (65\")   PainSc: 0-No pain     Estimated body mass index is 27.42 kg/m² as calculated from the following:    Height as of " "this encounter: 165.1 cm (65\").    Weight as of this encounter: 74.8 kg (164 lb 12.8 oz).    BMI is >= 25 and <30. (Overweight) The following options were offered after discussion;: weight loss educational material (shared in after visit summary) and exercise counseling/recommendations      Does the patient have evidence of cognitive impairment? No    Physical Exam  Vitals and nursing note reviewed.   Constitutional:       Appearance: Normal appearance.   HENT:      Right Ear: Tympanic membrane and ear canal normal.      Left Ear: Tympanic membrane and ear canal normal.   Eyes:      Extraocular Movements: Extraocular movements intact.      Pupils: Pupils are equal, round, and reactive to light.   Neck:      Vascular: No carotid bruit.   Cardiovascular:      Rate and Rhythm: Normal rate and regular rhythm.      Pulses: Normal pulses.      Heart sounds: Normal heart sounds.   Pulmonary:      Effort: Pulmonary effort is normal.      Breath sounds: Normal breath sounds.      Comments: Breast no masses noted  Abdominal:      General: Abdomen is flat.      Palpations: Abdomen is soft. There is no mass.      Tenderness: There is no abdominal tenderness.   Genitourinary:     General: Normal vulva.      Comments: Surgical past BSO--years-urethra appears normal atrophic vaginitis cervix present Pap smear taken no adnexal masses uterus normal size  Musculoskeletal:      Right lower leg: No edema.      Left lower leg: No edema.   Lymphadenopathy:      Cervical: No cervical adenopathy.   Skin:     General: Skin is warm and dry.      Capillary Refill: Capillary refill takes less than 2 seconds.   Neurological:      General: No focal deficit present.      Mental Status: She is alert and oriented to person, place, and time.   Psychiatric:         Mood and Affect: Mood normal.         Behavior: Behavior normal.         Thought Content: Thought content normal.         Judgment: Judgment normal.       Lab Results   Component Value " Date    CHLPL 143 04/29/2022    TRIG 87 04/29/2022    HDL 47 04/29/2022    LDL 79 04/29/2022    VLDL 17 04/29/2022    HGBA1C 6.00 (H) 04/29/2022            HEALTH RISK ASSESSMENT    Smoking Status:  Social History     Tobacco Use   Smoking Status Never Smoker   Smokeless Tobacco Never Used     Alcohol Consumption:  Social History     Substance and Sexual Activity   Alcohol Use Yes    Comment: occ     Fall Risk Screen:    STEADI Fall Risk Assessment was completed, and patient is at LOW risk for falls.Assessment completed on:6/14/2022    Depression Screening:  PHQ-2/PHQ-9 Depression Screening 6/14/2022   Retired PHQ-9 Total Score -   Retired Total Score -   Little Interest or Pleasure in Doing Things 0-->not at all   Feeling Down, Depressed or Hopeless 0-->not at all   PHQ-9: Brief Depression Severity Measure Score 0       Health Habits and Functional and Cognitive Screening:  Functional & Cognitive Status 6/14/2022   Do you have difficulty preparing food and eating? No   Do you have difficulty bathing yourself, getting dressed or grooming yourself? No   Do you have difficulty using the toilet? No   Do you have difficulty moving around from place to place? No   Do you have trouble with steps or getting out of a bed or a chair? No   Current Diet Low Carb Diet   Dental Exam Up to date   Eye Exam Up to date   Exercise (times per week) 5 times per week   Current Exercises Include Walking   Current Exercise Activities Include -   Do you need help using the phone?  No   Are you deaf or do you have serious difficulty hearing?  No   Do you need help with transportation? No   Do you need help shopping? No   Do you need help preparing meals?  No   Do you need help with housework?  No   Do you need help with laundry? No   Do you need help taking your medications? No   Do you need help managing money? No   Do you ever drive or ride in a car without wearing a seat belt? No   Have you felt unusual stress, anger or loneliness in the  last month? No   Who do you live with? Spouse   If you need help, do you have trouble finding someone available to you? No   Have you been bothered in the last four weeks by sexual problems? No   Do you have difficulty concentrating, remembering or making decisions? No       Age-appropriate Screening Schedule:  Refer to the list below for future screening recommendations based on patient's age, sex and/or medical conditions. Orders for these recommended tests are listed in the plan section. The patient has been provided with a written plan.    Health Maintenance   Topic Date Due   • DXA SCAN  06/10/2022   • MAMMOGRAM  06/17/2022   • INFLUENZA VACCINE  10/01/2022   • LIPID PANEL  04/29/2023   • PAP SMEAR  06/09/2023   • TDAP/TD VACCINES (2 - Td or Tdap) 07/16/2023   • ZOSTER VACCINE  Completed              Assessment & Plan   CMS Preventative Services Quick Reference  Risk Factors Identified During Encounter  Fall Risk-High or Moderate  The above risks/problems have been discussed with the patient.  Follow up actions/plans if indicated are seen below in the Assessment/Plan Section.  Pertinent information has been shared with the patient in the After Visit Summary.    Diagnoses and all orders for this visit:    1. Essential hypertension (Primary)    2. Mixed hyperlipidemia  -     Comprehensive Metabolic Panel  -     Lipid Panel    3. Elevated blood sugar  -     Hemoglobin A1c  -     POC Urinalysis Dipstick, Multipro    4. Fatigue, unspecified type  -     TSH  -     T4, free  -     CBC & Differential    5. Vitamin D deficiency  -     Vitamin D 25 Hydroxy    6. Memory loss  -     Vitamin B12  -     Folate    7. Screening for colorectal cancer  -     Cologuard - Stool, Per Rectum; Future    8. Breast cancer screening by mammogram  -     Mammo Screening Digital Tomosynthesis Bilateral With CAD; Future    9. Post menopausal syndrome  -     DEXA Bone Density Axial; Future    10. Medicare annual wellness visit,  subsequent    11. Screening for malignant neoplasm of the cervix  -     Pap IG (Image Guided)        Follow Up:   Return in about 6 months (around 12/14/2022).     An After Visit Summary and PPPS were made available to the patient.          I spent 30 minutes caring for Naina on this date of service. This time includes time spent by me in the following activities:preparing for the visit, reviewing tests, obtaining and/or reviewing a separately obtained history, performing a medically appropriate examination and/or evaluation , counseling and educating the patient/family/caregiver, ordering medications, tests, or procedures and documenting information in the medical record     Plan above patient recently doing well

## 2022-06-14 NOTE — PATIENT INSTRUCTIONS
Advance Care Planning and Advance Directives     You make decisions on a daily basis - decisions about where you want to live, your career, your home, your life. Perhaps one of the most important decisions you face is your choice for future medical care. Take time to talk with your family and your healthcare team and start planning today.  Advance Care Planning is a process that can help you:  · Understand possible future healthcare decisions in light of your own experiences  · Reflect on those decision in light of your goals and values  · Discuss your decisions with those closest to you and the healthcare professionals that care for you  · Make a plan by creating a document that reflects your wishes    Surrogate Decision Maker  In the event of a medical emergency, which has left you unable to communicate or to make your own decisions, you would need someone to make decisions for you.  It is important to discuss your preferences for medical treatment with this person while you are in good health.     Qualities of a surrogate decision maker:  • Willing to take on this role and responsibility  • Knows what you want for future medical care  • Willing to follow your wishes even if they don't agree with them  • Able to make difficult medical decisions under stressful circumstances    Advance Directives  These are legal documents you can create that will guide your healthcare team and decision maker(s) when needed. These documents can be stored in the electronic medical record.    · Living Will - a legal document to guide your care if you have a terminal condition or a serious illness and are unable to communicate. States vary by statute in document names/types, but most forms may include one or more of the following:        -  Directions regarding life-prolonging treatments        -  Directions regarding artificially provided nutrition/hydration        -  Choosing a healthcare decision maker        -  Direction  regarding organ/tissue donation    · Durable Power of  for Healthcare - this document names an -in-fact to make medical decisions for you, but it may also allow this person to make personal and financial decisions for you. Please seek the advice of an  if you need this type of document.    **Advance Directives are not required and no one may discriminate against you if you do not sign one.    Medical Orders  Many states allow specific forms/orders signed by your physician to record your wishes for medical treatment in your current state of health. This form, signed in personal communication with your physician, addresses resuscitation and other medical interventions that you may or may not want.      For more information or to schedule a time with a [facility] Advance Care Planning Facilitator contact: [facility] or call [phone] and someone will contact you directly.    Medicare Wellness  Personal Prevention Plan of Service     Date of Office Visit:    Encounter Provider:  Juan Luis Zafar MD  Place of Service:  [facility]  Patient Name: Naina Manning  :  1950    As part of the Medicare Wellness portion of your visit today, we are providing you with this personalized preventive plan of services (PPPS). This plan is based upon recommendations of the [place] Preventive Services Task Force (USPSTF) and the Advisory Committee on Immunization Practices (ACIP).    This lists the preventive care services that should be considered, and provides dates of when you are due. Items listed as completed are up-to-date and do not require any further intervention.    Health Maintenance   Topic Date Due   • Pneumococcal Vaccine 65+ (2 - PPSV23 or PCV20) 2018   • DXA SCAN  06/10/2022   • COVID-19 Vaccine (4 - Booster for Moderna series) 10/14/2022 (Originally 3/1/2022)   • MAMMOGRAM  2022   • INFLUENZA VACCINE  10/01/2022   •  LIPID PANEL  04/29/2023   • PAP SMEAR  06/09/2023   • ANNUAL WELLNESS VISIT  06/14/2023   • TDAP/TD VACCINES (2 - Td or Tdap) 07/16/2023   • COLORECTAL CANCER SCREENING  10/04/2026   • HEPATITIS C SCREENING  Completed   • ZOSTER VACCINE  Completed       No orders of the defined types were placed in this encounter.      Return in about 6 months (around 12/14/2022).

## 2022-06-15 LAB
25(OH)D3+25(OH)D2 SERPL-MCNC: 50.8 NG/ML (ref 30–100)
ALBUMIN SERPL-MCNC: 4.9 G/DL (ref 3.5–5.2)
ALBUMIN/GLOB SERPL: 1.6 G/DL
ALP SERPL-CCNC: 61 U/L (ref 39–117)
ALT SERPL-CCNC: 15 U/L (ref 1–33)
AST SERPL-CCNC: 14 U/L (ref 1–32)
BASOPHILS # BLD AUTO: 0.05 10*3/MM3 (ref 0–0.2)
BASOPHILS NFR BLD AUTO: 0.8 % (ref 0–1.5)
BILIRUB SERPL-MCNC: 0.4 MG/DL (ref 0–1.2)
BUN SERPL-MCNC: 11 MG/DL (ref 8–23)
BUN/CREAT SERPL: 18 (ref 7–25)
CALCIUM SERPL-MCNC: 9.6 MG/DL (ref 8.6–10.5)
CHLORIDE SERPL-SCNC: 95 MMOL/L (ref 98–107)
CHOLEST SERPL-MCNC: 147 MG/DL (ref 0–200)
CO2 SERPL-SCNC: 27 MMOL/L (ref 22–29)
CREAT SERPL-MCNC: 0.61 MG/DL (ref 0.57–1)
EGFRCR SERPLBLD CKD-EPI 2021: 96.3 ML/MIN/1.73
EOSINOPHIL # BLD AUTO: 0.09 10*3/MM3 (ref 0–0.4)
EOSINOPHIL NFR BLD AUTO: 1.4 % (ref 0.3–6.2)
ERYTHROCYTE [DISTWIDTH] IN BLOOD BY AUTOMATED COUNT: 12.7 % (ref 12.3–15.4)
FOLATE SERPL-MCNC: 11.5 NG/ML (ref 4.78–24.2)
GLOBULIN SER CALC-MCNC: 3 GM/DL
GLUCOSE SERPL-MCNC: 102 MG/DL (ref 65–99)
HBA1C MFR BLD: 5.2 % (ref 4.8–5.6)
HCT VFR BLD AUTO: 44 % (ref 34–46.6)
HDLC SERPL-MCNC: 44 MG/DL (ref 40–60)
HGB BLD-MCNC: 14.3 G/DL (ref 12–15.9)
IMM GRANULOCYTES # BLD AUTO: 0.02 10*3/MM3 (ref 0–0.05)
IMM GRANULOCYTES NFR BLD AUTO: 0.3 % (ref 0–0.5)
LDLC SERPL CALC-MCNC: 81 MG/DL (ref 0–100)
LYMPHOCYTES # BLD AUTO: 1.8 10*3/MM3 (ref 0.7–3.1)
LYMPHOCYTES NFR BLD AUTO: 27.7 % (ref 19.6–45.3)
MCH RBC QN AUTO: 29.7 PG (ref 26.6–33)
MCHC RBC AUTO-ENTMCNC: 32.5 G/DL (ref 31.5–35.7)
MCV RBC AUTO: 91.3 FL (ref 79–97)
MONOCYTES # BLD AUTO: 0.43 10*3/MM3 (ref 0.1–0.9)
MONOCYTES NFR BLD AUTO: 6.6 % (ref 5–12)
NEUTROPHILS # BLD AUTO: 4.11 10*3/MM3 (ref 1.7–7)
NEUTROPHILS NFR BLD AUTO: 63.2 % (ref 42.7–76)
NRBC BLD AUTO-RTO: 0 /100 WBC (ref 0–0.2)
PLATELET # BLD AUTO: 447 10*3/MM3 (ref 140–450)
POTASSIUM SERPL-SCNC: 4.5 MMOL/L (ref 3.5–5.2)
PROT SERPL-MCNC: 7.9 G/DL (ref 6–8.5)
RBC # BLD AUTO: 4.82 10*6/MM3 (ref 3.77–5.28)
SODIUM SERPL-SCNC: 137 MMOL/L (ref 136–145)
T4 FREE SERPL-MCNC: 1.58 NG/DL (ref 0.93–1.7)
TRIGL SERPL-MCNC: 125 MG/DL (ref 0–150)
TSH SERPL DL<=0.005 MIU/L-ACNC: 1.38 UIU/ML (ref 0.27–4.2)
VIT B12 SERPL-MCNC: 452 PG/ML (ref 211–946)
VLDLC SERPL CALC-MCNC: 22 MG/DL (ref 5–40)
WBC # BLD AUTO: 6.5 10*3/MM3 (ref 3.4–10.8)

## 2022-06-16 ENCOUNTER — PATIENT ROUNDING (BHMG ONLY) (OUTPATIENT)
Dept: FAMILY MEDICINE CLINIC | Facility: CLINIC | Age: 72
End: 2022-06-16

## 2022-06-16 NOTE — PROGRESS NOTES
June 16, 2022    Hello, may I speak with Naina Manning?    My name is Aileen     I am  with [facility]  [address], [address]  [place] [address] [phone].    Before we get started may I verify your date of birth? 1950    I am calling to officially welcome you to our practice and ask about your recent visit. Is this a good time to talk? Yes     Tell me about your visit with us. What things went well?  Everything was ok!       We're always looking for ways to make our patients' experiences even better. Do you have recommendations on ways we may improve?  No    Overall were you satisfied with your first visit to our practice? Yes       I appreciate you taking the time to speak with me today. Is there anything else I can do for you? No--concerned about phone prompt changing & unable to reach office.      Thank you, and have a great day.

## 2022-06-18 LAB
CYTOLOGIST CVX/VAG CYTO: NORMAL
CYTOLOGY CVX/VAG DOC CYTO: NORMAL
CYTOLOGY CVX/VAG DOC THIN PREP: NORMAL
DX ICD CODE: NORMAL
HIV 1 & 2 AB SER-IMP: NORMAL
Lab: NORMAL
OTHER STN SPEC: NORMAL
STAT OF ADQ CVX/VAG CYTO-IMP: NORMAL

## 2022-07-08 RX ORDER — ROSUVASTATIN CALCIUM 10 MG/1
TABLET, COATED ORAL
Qty: 90 TABLET | Refills: 3 | Status: SHIPPED | OUTPATIENT
Start: 2022-07-08

## 2022-07-08 NOTE — TELEPHONE ENCOUNTER
Rx Refill Note  Requested Prescriptions     Pending Prescriptions Disp Refills   • rosuvastatin (CRESTOR) 10 MG tablet [Pharmacy Med Name: ROSUVASTATIN TABS 10MG] 90 tablet 3     Sig: TAKE 1 TABLET EVERY NIGHT      Last office visit with prescribing clinician: 6/14/2022      Next office visit with prescribing clinician: 12/15/2022   CPE done 06/14/2022    {TIP  Please add Last Relevant Lab Date if appropriate: 06/14/20211  {TIP  Is Refill Pharmacy correct? Yes    Kaycee Branch MA  07/08/22, 12:11 CDT

## 2022-07-18 RX ORDER — ONDANSETRON 4 MG/1
TABLET, FILM COATED ORAL
Qty: 10 TABLET | Refills: 0 | Status: SHIPPED | OUTPATIENT
Start: 2022-07-18 | End: 2022-08-05

## 2022-07-18 NOTE — TELEPHONE ENCOUNTER
Spouse came in for med refill and mentioned wife started vomiting around 2am and has been continuous.  No, fever, abd pain or diarrhea.  Is there something otc or what can she take?      [facility]

## 2022-08-05 ENCOUNTER — OFFICE VISIT (OUTPATIENT)
Dept: FAMILY MEDICINE CLINIC | Facility: CLINIC | Age: 72
End: 2022-08-05

## 2022-08-05 VITALS
OXYGEN SATURATION: 98 % | BODY MASS INDEX: 27.21 KG/M2 | SYSTOLIC BLOOD PRESSURE: 154 MMHG | WEIGHT: 163.3 LBS | DIASTOLIC BLOOD PRESSURE: 83 MMHG | HEART RATE: 92 BPM | HEIGHT: 65 IN

## 2022-08-05 DIAGNOSIS — N34.2 URETHRITIS: Primary | ICD-10-CM

## 2022-08-05 LAB
BILIRUB BLD-MCNC: NEGATIVE MG/DL
CLARITY, POC: ABNORMAL
COLOR UR: YELLOW
GLUCOSE UR STRIP-MCNC: NEGATIVE MG/DL
KETONES UR QL: NEGATIVE
LEUKOCYTE EST, POC: NEGATIVE
NITRITE UR-MCNC: NEGATIVE MG/ML
PH UR: 6 [PH] (ref 5–8)
PROT UR STRIP-MCNC: NEGATIVE MG/DL
RBC # UR STRIP: ABNORMAL /UL
SP GR UR: 1.02 (ref 1–1.03)
UROBILINOGEN UR QL: NORMAL

## 2022-08-05 PROCEDURE — 99213 OFFICE O/P EST LOW 20 MIN: CPT | Performed by: FAMILY MEDICINE

## 2022-08-05 PROCEDURE — 81003 URINALYSIS AUTO W/O SCOPE: CPT | Performed by: FAMILY MEDICINE

## 2022-08-05 RX ORDER — PHENAZOPYRIDINE HYDROCHLORIDE 200 MG/1
200 TABLET, FILM COATED ORAL 3 TIMES DAILY PRN
Qty: 30 TABLET | Refills: 2 | Status: SHIPPED | OUTPATIENT
Start: 2022-08-05 | End: 2022-08-08

## 2022-08-05 NOTE — PROGRESS NOTES
"Naina Nigel    1950    Chief Complaint   Patient presents with   • Flank Pain       Vitals:    08/05/22 1111   BP: 154/83   BP Location: Left arm   Patient Position: Sitting   Cuff Size: Large Adult   Pulse: 92   SpO2: 98%   Weight: 74.1 kg (163 lb 4.8 oz)   Height: 165.1 cm (65\")       72-year-old female with a pressure feeling when voiding      Review of Systems   Constitutional: Negative for chills and fever.   Genitourinary: Positive for difficulty urinating and dysuria.   Musculoskeletal: Negative for back pain.       Past Medical History:   Diagnosis Date   • Arthritis    • Hyperlipidemia    • Hypertension    • Hypothyroidism          Current Outpatient Medications:   •  acetaminophen (TYLENOL) 500 MG tablet, Take 1,000 mg by mouth 2 (Two) Times a Day., Disp: , Rfl:   •  amLODIPine (NORVASC) 10 MG tablet, TAKE 1 TABLET DAILY, Disp: 90 tablet, Rfl: 3  •  cholecalciferol (VITAMIN D3) 1000 units tablet, Take 1,000 Units by mouth Daily., Disp: , Rfl:   •  cloNIDine (CATAPRES) 0.1 MG tablet, TAKE 1 TABLET EVERY NIGHT, Disp: 90 tablet, Rfl: 3  •  cloNIDine (CATAPRES) 0.2 MG tablet, TAKE 1 TABLET EVERY MORNING, Disp: 90 tablet, Rfl: 3  •  estradiol (ESTRACE) 0.1 MG/GM vaginal cream, , Disp: , Rfl:   •  fluticasone (FLONASE) 50 MCG/ACT nasal spray, USE 2 SPRAYS IN EACH NOSTRIL DAILY, Disp: 16 g, Rfl: 11  •  hydrOXYzine (ATARAX) 25 MG tablet, Take 1-2 tablets every 4-6 hours as needed for anxiety., Disp: 60 tablet, Rfl: 0  •  indapamide (LOZOL) 2.5 MG tablet, TAKE 1 TABLET DAILY, Disp: 90 tablet, Rfl: 3  •  levothyroxine (SYNTHROID, LEVOTHROID) 50 MCG tablet, TAKE 1 TABLET EVERY MORNING, Disp: 90 tablet, Rfl: 3  •  losartan (COZAAR) 100 MG tablet, TAKE 1 TABLET EVERY MORNING, Disp: 90 tablet, Rfl: 3  •  metFORMIN (Glucophage) 500 MG tablet, Take 1 tablet by mouth 2 (Two) Times a Day With Meals., Disp: 180 tablet, Rfl: 3  •  omeprazole (priLOSEC) 40 MG capsule, TAKE 1 CAPSULE DAILY, Disp: 90 capsule, Rfl: 3  •  " rosuvastatin (CRESTOR) 10 MG tablet, TAKE 1 TABLET EVERY NIGHT, Disp: 90 tablet, Rfl: 3  •  phenazopyridine (Pyridium) 200 MG tablet, Take 1 tablet by mouth 3 (Three) Times a Day As Needed for Bladder Spasms., Disp: 30 tablet, Rfl: 2    Allergies   Allergen Reactions   • Sulfa Antibiotics Hives   • Penicillins Hives   • Prednisone Other (See Comments)     thrush       Patient Active Problem List   Diagnosis   • Body mass index (BMI) of 25.0 to 29.9   • Essential hypertension   • Hypertension   • Hypothyroid   • Hypothyroidism   • Seasonal allergic rhinitis       Social History     Socioeconomic History   • Marital status:    Tobacco Use   • Smoking status: Never Smoker   • Smokeless tobacco: Never Used   Substance and Sexual Activity   • Alcohol use: Yes     Comment: occ   • Drug use: No   • Sexual activity: Yes     Partners: Male     Birth control/protection: Post-menopausal       Past Surgical History:   Procedure Laterality Date   • CHOLECYSTECTOMY     • COLONOSCOPY  10/04/2016    [facility] [place] - DR KING   • EYE SURGERY     • SALPINGO OOPHORECTOMY Bilateral 4/24/2019    Procedure: Bilateral SALPINGO OOPHORECTOMY LAPAROSCOPIC;  Surgeon: Aashish Ty MD;  Location: [facility];  Service: Obstetrics/Gynecology   • TUBAL ABDOMINAL LIGATION         Physical Exam  Vitals and nursing note reviewed.   Constitutional:       Appearance: Normal appearance.   Eyes:      Extraocular Movements: Extraocular movements intact.      Pupils: Pupils are equal, round, and reactive to light.   Pulmonary:      Effort: Pulmonary effort is normal.   Abdominal:      General: Abdomen is flat.      Palpations: Abdomen is soft.      Tenderness: There is no right CVA tenderness or left CVA tenderness.   Genitourinary:     Comments: Pelvic exam in a patient that is bilateral oophorectomy-urethra point tenderness , uterus normal size no adnexal masses  Musculoskeletal:      Right lower leg: No edema.      Left lower leg: No edema.  "  Skin:     General: Skin is warm and dry.   Neurological:      General: No focal deficit present.      Mental Status: She is alert and oriented to person, place, and time.   Psychiatric:         Mood and Affect: Mood normal.         Behavior: Behavior normal.         Thought Content: Thought content normal.         Judgment: Judgment normal.         Vitals:    08/05/22 1111   BP: 154/83   BP Location: Left arm   Patient Position: Sitting   Cuff Size: Large Adult   Pulse: 92   SpO2: 98%   Weight: 74.1 kg (163 lb 4.8 oz)   Height: 165.1 cm (65\")     BMI is >= 25 and <30. (Overweight) The following options were offered after discussion;: exercise counseling/recommendations      Diagnoses and all orders for this visit:    1. Urethritis (Primary)    Other orders  -     phenazopyridine (Pyridium) 200 MG tablet; Take 1 tablet by mouth 3 (Three) Times a Day As Needed for Bladder Spasms.  Dispense: 30 tablet; Refill: 2        Problems Addressed this Visit    None     Visit Diagnoses     Urethritis    -  Primary    Relevant Medications    phenazopyridine (Pyridium) 200 MG tablet      Diagnoses       Codes Comments    Urethritis    -  Primary ICD-10-CM: N34.2  ICD-9-CM: 597.80           Health Maintenance:  Immunization History   Administered Date(s) Administered   • COVID-19 (MODERNA) 1st, 2nd, 3rd Dose Only 02/23/2021, 03/23/2021, 11/01/2021   • Fluad Quad 65+ 09/03/2020   • Fluzone High Dose =>65 Years ([employer] ONLY) 09/28/2017, 10/17/2018   • Fluzone High-Dose 65+yrs 10/04/2021   • Hepatitis A 09/25/2006, 03/27/2007   • Hepatitis B 04/08/1998   • Pneumococcal Conjugate 13-Valent (PCV13) 07/11/2017   • Pneumococcal Polysaccharide (PPSV23) 07/16/2013   • Shingrix 12/27/2018, 08/14/2019   • Tdap 07/16/2013   • flucelvax quad pfs =>4 YRS 10/22/2019   • influenza Split 10/01/2016            Plan above-urethritis-Pyridium-increase fluid intake-change positions sexually-urine totally normal                      Electronically " signed by Juan Luis Zafar MD 08/05/2022

## 2022-08-06 ENCOUNTER — NURSE TRIAGE (OUTPATIENT)
Dept: CALL CENTER | Facility: HOSPITAL | Age: 72
End: 2022-08-06

## 2022-08-06 NOTE — TELEPHONE ENCOUNTER
Was put on Pyridium for UTI. MD told her to stop taking as she was having stomach cramping. She said she was told to take a gaviscon to settle it and wanted to be sure that was okay. Spotted in [facility] website utilized to ensure no documented reaction. No interactions found for the two medications.       Reason for Disposition  • Caller has medicine question only, adult not sick, AND triager answers question    Additional Information  • Negative: [1] Intentional drug overdose AND [2] suicidal thoughts or ideas  • Negative: Drug overdose and triager unable to answer question  • Negative: Caller requesting information unrelated to medicine  • Negative: Caller requesting information about COVID-19 Vaccine  • Negative: Caller requesting information about Emergency Contraception  • Negative: Caller requesting information about Combined Birth Control Pills  • Negative: Caller requesting information about Progestin Birth Control Pills  • Negative: Caller requesting information about Post-Op pain or medicines  • Negative: Caller requesting a prescription antibiotic (such as Penicillin) for Strep throat and has a positive culture result  • Negative: Caller requesting a prescription anti-viral med (such as Tamiflu) and has influenza (flu)  symptoms  • Negative: Immunization reaction suspected  • Negative: Rash while taking a medicine or within 3 days of stopping it  • Negative: [1] Asthma and [2] having symptoms of asthma (cough, wheezing, etc.)  • Negative: [1] Symptom of illness (e.g., headache, abdominal pain, earache, vomiting) AND [2] more than mild  • Negative: Breastfeeding questions about mother's medicines and diet  • Negative: MORE THAN A DOUBLE DOSE of a prescription or over-the-counter (OTC) drug  • Negative: [1] DOUBLE DOSE (an extra dose or lesser amount) of prescription drug AND [2] any symptoms (e.g., dizziness, nausea, pain, sleepiness)  • Negative: [1] DOUBLE DOSE (an extra dose or lesser amount) of  over-the-counter (OTC) drug AND [2] any symptoms (e.g., dizziness, nausea, pain, sleepiness)  • Negative: Took another person's prescription drug  • Negative: [1] DOUBLE DOSE (an extra dose or lesser amount) of prescription drug AND [2] NO symptoms (Exception: a double dose of antibiotics)  • Negative: Diabetes drug error or overdose (e.g., took wrong type of insulin or took extra dose)  • Negative: [1] Prescription refill request for ESSENTIAL medicine (i.e., likelihood of harm to patient if not taken) AND [2] triager unable to refill per department policy  • Negative: [1] Prescription not at pharmacy AND [2] was prescribed by PCP recently (Exception: triager has access to EMR and prescription is recorded there. Go to Home Care and confirm for pharmacy.)  • Negative: [1] Pharmacy calling with prescription question AND [2] triager unable to answer question  • Negative: [1] Caller has URGENT medicine question about med that PCP or specialist prescribed AND [2] triager unable to answer question  • Negative: Medicine patch causing local rash or itching  • Negative: [1] Caller has medicine question about med NOT prescribed by PCP AND [2] triager unable to answer question (e.g., compatibility with other med, storage)  • Negative: Prescription request for new medicine (not a refill)  • Negative: Prescription refill request for a CONTROLLED substance (e.g., narcotics, ADHD medicines)  • Negative: [1] Prescription refill request for NON-ESSENTIAL medicine (i.e., no harm to patient if med not taken) AND [2] triager unable to refill per department policy  • Negative: [1] Caller has NON-URGENT medicine question about med that PCP prescribed AND [2] triager unable to answer question  • Negative: Caller wants to use a complementary or alternative medicine  • Negative: [1] Prescription prescribed recently is not at pharmacy AND [2] triager has access to patient's EMR AND [3] prescription is recorded in the EMR  • Negative: [1]  DOUBLE DOSE (an extra dose or lesser amount) of over-the-counter (OTC) drug AND [2] NO symptoms  • Negative: [1] DOUBLE DOSE (an extra dose or lesser amount) of antibiotic drug AND [2] NO symptoms    Answer Assessment - Initial Assessment Questions  Was put on Pyridium for UTI. MD told her to stop taking as she was having stomach cramping. She said she was told to take a gaviscon to settle it and wanted to be sure that was okay.    Protocols used: MEDICATION QUESTION CALL-ADULT-

## 2022-08-08 ENCOUNTER — OFFICE VISIT (OUTPATIENT)
Dept: FAMILY MEDICINE CLINIC | Facility: CLINIC | Age: 72
End: 2022-08-08

## 2022-08-08 VITALS
SYSTOLIC BLOOD PRESSURE: 136 MMHG | HEART RATE: 103 BPM | DIASTOLIC BLOOD PRESSURE: 80 MMHG | WEIGHT: 161.2 LBS | RESPIRATION RATE: 16 BRPM | TEMPERATURE: 98.4 F | HEIGHT: 65 IN | BODY MASS INDEX: 26.86 KG/M2 | OXYGEN SATURATION: 98 %

## 2022-08-08 DIAGNOSIS — R31.29 OTHER MICROSCOPIC HEMATURIA: ICD-10-CM

## 2022-08-08 DIAGNOSIS — R10.9 FLANK PAIN: Primary | ICD-10-CM

## 2022-08-08 DIAGNOSIS — N34.2 URETHRITIS: ICD-10-CM

## 2022-08-08 LAB
BILIRUB BLD-MCNC: NEGATIVE MG/DL
CLARITY, POC: CLEAR
COLOR UR: YELLOW
GLUCOSE UR STRIP-MCNC: NEGATIVE MG/DL
KETONES UR QL: NEGATIVE
LEUKOCYTE EST, POC: ABNORMAL
NITRITE UR-MCNC: NEGATIVE MG/ML
PH UR: 6 [PH] (ref 5–8)
PROT UR STRIP-MCNC: NEGATIVE MG/DL
RBC # UR STRIP: ABNORMAL /UL
SP GR UR: 1.01 (ref 1–1.03)
UROBILINOGEN UR QL: NORMAL

## 2022-08-08 PROCEDURE — 81003 URINALYSIS AUTO W/O SCOPE: CPT | Performed by: FAMILY MEDICINE

## 2022-08-08 PROCEDURE — 99213 OFFICE O/P EST LOW 20 MIN: CPT | Performed by: FAMILY MEDICINE

## 2022-08-08 PROCEDURE — 96372 THER/PROPH/DIAG INJ SC/IM: CPT | Performed by: FAMILY MEDICINE

## 2022-08-08 RX ORDER — CEFDINIR 300 MG/1
300 CAPSULE ORAL 2 TIMES DAILY
Qty: 14 CAPSULE | Refills: 0 | Status: SHIPPED | OUTPATIENT
Start: 2022-08-08 | End: 2022-10-04

## 2022-08-08 RX ORDER — CEFTRIAXONE 500 MG/1
500 INJECTION, POWDER, FOR SOLUTION INTRAMUSCULAR; INTRAVENOUS EVERY 24 HOURS
Status: COMPLETED | OUTPATIENT
Start: 2022-08-08 | End: 2022-08-08

## 2022-08-08 RX ADMIN — CEFTRIAXONE 500 MG: 500 INJECTION, POWDER, FOR SOLUTION INTRAMUSCULAR; INTRAVENOUS at 14:00

## 2022-08-08 NOTE — PROGRESS NOTES
Anna Manning is a 72 y.o. female.     72-year-old female with dysuria and suprapubic fullness      The following portions of the patient's history were reviewed and updated as appropriate: allergies, current medications, past family history, past medical history, past social history, past surgical history and problem list.    Review of Systems   Constitutional: Negative for chills and fever.   Respiratory: Negative for shortness of breath.    Gastrointestinal: Negative for abdominal pain.   Genitourinary: Positive for difficulty urinating and dysuria.       Objective   Physical Exam  Vitals and nursing note reviewed.   Abdominal:      General: Abdomen is flat. There is no distension.      Palpations: Abdomen is soft. There is no mass.      Tenderness: There is no abdominal tenderness. There is no right CVA tenderness or left CVA tenderness.   Musculoskeletal:      Right lower leg: No edema.      Left lower leg: No edema.   Skin:     General: Skin is warm and dry.   Neurological:      General: No focal deficit present.      Mental Status: She is oriented to person, place, and time.   Psychiatric:         Mood and Affect: Mood normal.         Behavior: Behavior normal.         Thought Content: Thought content normal.         Judgment: Judgment normal.         Assessment & Plan   Diagnoses and all orders for this visit:    1. Flank pain (Primary)  -     POC Urinalysis Dipstick, Multipro    Other orders  -     cefdinir (OMNICEF) 300 MG capsule; Take 1 capsule by mouth 2 (Two) Times a Day.  Dispense: 14 capsule; Refill: 0         Plan bladder scan 20 mL residual-above-if does not improve will need cystoscopy and  work-up

## 2022-08-10 LAB
BACTERIA UR CULT: NORMAL
BACTERIA UR CULT: NORMAL

## 2022-08-23 RX ORDER — INDAPAMIDE 2.5 MG/1
TABLET, FILM COATED ORAL
Qty: 90 TABLET | Refills: 3 | Status: SHIPPED | OUTPATIENT
Start: 2022-08-23

## 2022-08-23 RX ORDER — CLONIDINE HYDROCHLORIDE 0.2 MG/1
TABLET ORAL
Qty: 90 TABLET | Refills: 3 | Status: SHIPPED | OUTPATIENT
Start: 2022-08-23

## 2022-08-23 RX ORDER — LOSARTAN POTASSIUM 100 MG/1
TABLET ORAL
Qty: 90 TABLET | Refills: 3 | Status: SHIPPED | OUTPATIENT
Start: 2022-08-23

## 2022-08-23 RX ORDER — CLONIDINE HYDROCHLORIDE 0.1 MG/1
TABLET ORAL
Qty: 90 TABLET | Refills: 3 | Status: SHIPPED | OUTPATIENT
Start: 2022-08-23

## 2022-08-23 RX ORDER — AMLODIPINE BESYLATE 10 MG/1
TABLET ORAL
Qty: 90 TABLET | Refills: 3 | Status: SHIPPED | OUTPATIENT
Start: 2022-08-23

## 2022-08-23 RX ORDER — LEVOTHYROXINE SODIUM 0.05 MG/1
TABLET ORAL
Qty: 90 TABLET | Refills: 3 | Status: SHIPPED | OUTPATIENT
Start: 2022-08-23

## 2022-08-23 NOTE — TELEPHONE ENCOUNTER
Rx Refill Note  Requested Prescriptions     Pending Prescriptions Disp Refills   • losartan (COZAAR) 100 MG tablet [Pharmacy Med Name: LOSARTAN TABS 100MG] 90 tablet 3     Sig: TAKE 1 TABLET EVERY MORNING   • indapamide (LOZOL) 2.5 MG tablet [Pharmacy Med Name: INDAPAMIDE TABS 2.5MG] 90 tablet 3     Sig: TAKE 1 TABLET DAILY   • cloNIDine (CATAPRES) 0.1 MG tablet [Pharmacy Med Name: CLONIDINE HCL TABS 0.1MG] 90 tablet 3     Sig: TAKE 1 TABLET EVERY NIGHT   • cloNIDine (CATAPRES) 0.2 MG tablet [Pharmacy Med Name: CLONIDINE HCL TABS 0.2MG] 90 tablet 3     Sig: TAKE 1 TABLET EVERY MORNING   • levothyroxine (SYNTHROID, LEVOTHROID) 50 MCG tablet [Pharmacy Med Name: L-THYROXINE (SYNTHROID) TABS 50MCG] 90 tablet 3     Sig: TAKE 1 TABLET EVERY MORNING   • amLODIPine (NORVASC) 10 MG tablet [Pharmacy Med Name: AMLODIPINE BESYLATE TABS 10MG] 90 tablet 3     Sig: TAKE 1 TABLET DAILY      Last office visit with prescribing clinician: 8/8/22   Next office visit with prescribing clinician: 12/15/22      {TIP  Please add Last Relevant Lab 6/14/22    Kaycee Mora MA  08/23/22, 07:30 CDT

## 2022-09-30 DIAGNOSIS — F41.9 ANXIETY: ICD-10-CM

## 2022-09-30 RX ORDER — HYDROXYZINE HYDROCHLORIDE 25 MG/1
TABLET, FILM COATED ORAL
Qty: 120 TABLET | Refills: 1 | Status: SHIPPED | OUTPATIENT
Start: 2022-09-30

## 2022-09-30 NOTE — TELEPHONE ENCOUNTER
Rx Refill Note  Requested Prescriptions     Pending Prescriptions Disp Refills   • hydrOXYzine (ATARAX) 25 MG tablet 120 tablet 1     Sig: Take 1-2 tablets every 4-6 hours as needed for anxiety.      Last office visit with prescribing clinician: 8/8/2022      Next office visit with prescribing clinician: 12/15/2022            Farzad Cormier Rep  09/30/22, 09:41 CDT

## 2022-10-04 ENCOUNTER — OFFICE VISIT (OUTPATIENT)
Dept: FAMILY MEDICINE CLINIC | Facility: CLINIC | Age: 72
End: 2022-10-04

## 2022-10-04 VITALS
OXYGEN SATURATION: 98 % | DIASTOLIC BLOOD PRESSURE: 68 MMHG | HEART RATE: 72 BPM | HEIGHT: 65 IN | TEMPERATURE: 98.4 F | BODY MASS INDEX: 27.12 KG/M2 | WEIGHT: 162.8 LBS | RESPIRATION RATE: 16 BRPM | SYSTOLIC BLOOD PRESSURE: 122 MMHG

## 2022-10-04 DIAGNOSIS — M25.551 RIGHT HIP PAIN: Primary | ICD-10-CM

## 2022-10-04 PROCEDURE — 99212 OFFICE O/P EST SF 10 MIN: CPT | Performed by: FAMILY MEDICINE

## 2022-10-04 NOTE — PROGRESS NOTES
Subjective   Naina Manning is a 72 y.o. female.     History of Present Illness  72-year-old female with a right hip pain      The following portions of the patient's history were reviewed and updated as appropriate: allergies, current medications, past family history, past medical history, past social history, past surgical history and problem list.    Review of Systems   Respiratory: Negative for shortness of breath.    Cardiovascular: Negative for chest pain and leg swelling.   Musculoskeletal: Positive for gait problem. Negative for arthralgias and back pain.       Objective   Physical Exam  Vitals and nursing note reviewed.   Constitutional:       Appearance: Normal appearance.   Musculoskeletal:         General: Tenderness present.      Right lower leg: No edema.      Left lower leg: No edema.      Comments: Right buttocks pain-normal range of motion internal and external right hip-negative hip x-rays 2019   Skin:     General: Skin is warm and dry.   Neurological:      General: No focal deficit present.      Mental Status: She is alert and oriented to person, place, and time.   Psychiatric:         Mood and Affect: Mood normal.         Behavior: Behavior normal.         Thought Content: Thought content normal.         Judgment: Judgment normal.         Assessment & Plan   Diagnoses and all orders for this visit:    1. 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

## 2022-10-06 ENCOUNTER — FLU SHOT (OUTPATIENT)
Dept: FAMILY MEDICINE CLINIC | Facility: CLINIC | Age: 72
End: 2022-10-06

## 2022-10-06 DIAGNOSIS — Z23 NEED FOR INFLUENZA VACCINATION: Primary | ICD-10-CM

## 2022-10-06 PROCEDURE — 90662 IIV NO PRSV INCREASED AG IM: CPT | Performed by: FAMILY MEDICINE

## 2022-10-06 PROCEDURE — G0008 ADMIN INFLUENZA VIRUS VAC: HCPCS | Performed by: FAMILY MEDICINE

## 2022-10-12 ENCOUNTER — HOSPITAL ENCOUNTER (OUTPATIENT)
Dept: PHYSICAL THERAPY | Facility: HOSPITAL | Age: 72
Setting detail: THERAPIES SERIES
Discharge: HOME | End: 2022-10-12

## 2022-10-12 DIAGNOSIS — M25.551 RIGHT HIP PAIN: Primary | ICD-10-CM

## 2022-10-12 PROCEDURE — 97161 PT EVAL LOW COMPLEX 20 MIN: CPT | Performed by: PHYSICAL THERAPIST

## 2022-10-12 PROCEDURE — 97110 THERAPEUTIC EXERCISES: CPT | Performed by: PHYSICAL THERAPIST

## 2022-10-13 NOTE — THERAPY EVALUATION
"    Outpatient Physical Therapy Ortho Initial Evaluation  [facility]     Patient Name: Naina Manning  : 1950  MRN: [id number]  Today's Date: 10/12/2022      Visit Date: 10/12/2022     Attendance: 1 visits  Subjective improvement: N/A  MD appt: 12/15/2022  Recheck due: 2022      Patient Active Problem List   Diagnosis   • Body mass index (BMI) of 25.0 to 29.9   • Essential hypertension   • Hypertension   • Hypothyroid   • Hypothyroidism   • Seasonal allergic rhinitis        Past Medical History:   Diagnosis Date   • Arthritis    • Hyperlipidemia    • Hypertension    • Hypothyroidism         Past Surgical History:   Procedure Laterality Date   • CHOLECYSTECTOMY     • [place]    [facility] [place]   • EYE SURGERY     • SALPINGO OOPHORECTOMY Bilateral 2019    Procedure: Bilateral SALPINGO OOPHORECTOMY LAPAROSCOPIC;  Surgeon: Aashish Ty MD;  Location: [facility] OR;  Service: Obstetrics/Gynecology   • TUBAL ABDOMINAL LIGATION         Visit Dx:     ICD-10-CM ICD-9-CM   1. Right hip pain  M25.551 719.45          Patient History     Row Name 10/12/22 1500             History    Chief Complaint Difficulty Walking;Pain;Difficulty with daily activities;Tightness  -KG      Type of Pain Hip pain  -KG      Date Current Problem(s) Began 10/01/22  -KG      Brief Description of Current Complaint Pt presents with R post hip pain that started earlier this month. States she was at an [employer] [place], did a lot of walking. After that, started having a lot of hip pain. States earlier she walked [place] around [place] and was \"hobbling\" by the time she was done. Pain post hip and travels down lateral side of thigh to the knee. Also has some tingling. Hx of bursitis often in that same hip. Denies any back pain. Lives in two level home. Lives with .  -KG      Patient/Caregiver Goals Relieve pain;Return to prior level of function;Know what to do to help the symptoms  -KG      Hand " Dominance ambidextrous  -KG      Occupation/sports/leisure activities Walking, working in yard. Retired   -KG      What clinical tests have you had for this problem? --  No recent imaging. Had hip radiograph in 2019, which was normal  -KG         Pain     Pain Location Hip  -KG      Pain at Present 5  -KG      Pain at Best 2  -KG      Pain at Worst 10  -KG      Pain Frequency Constant/continuous  -KG      Pain Description Aching;Sore;Tingling;Tightness  -KG      What Performance Factors Make the Current Problem(s) WORSE? Stairs - going up worse than going down, walking, standing for short periods  -KG      What Performance Factors Make the Current Problem(s) BETTER? Sit & rest, biofreeze, heating pad occasionally  -KG      Is your sleep disturbed? No  -KG      What position do you sleep in? Right sidelying;Left sidelying  -KG         Fall Risk Assessment    Any falls in the past year: No  -KG            User Key  (r) = Recorded By, (t) = Taken By, (c) = Cosigned By    Initials Name Provider Type    KG Ana Armstrong, PT Physical Therapist                    PT Ortho     Row Name 10/12/22 1500       Precautions and Contraindications    Precautions/Limitations no known precautions/limitations  -KG       Posture/Observations    Posture/Observations Comments No acute distress. Decreased overall postural awareness. Supine R ASIS and IC superior vs L. Near equal at pelvis after MET.  -KG       Quarter Clearing    Quarter Clearing Lower Quarter Clearing  -KG       DTR- Lower Quarter Clearing    Patellar tendon (L2-4) Bilateral:;2- Normal response  -KG    Achilles tendon (S1-2) Bilateral:;2- Normal response  -KG       Neural Tension Signs- Lower Quarter Clearing    Slump Bilateral:;Negative  -KG    SLR Bilateral:;Negative  -KG       Sensory Screen for Light Touch- Lower Quarter Clearing    L1 (inguinal area) Bilateral:;Intact  -KG    L2 (anterior mid thigh) Bilateral:;Intact  -KG    L3 (distal anterior thigh)  Bilateral:;Intact  -KG    L4 (medial lower leg/foot) Bilateral:;Intact  -KG    L5 (lateral lower leg/great toe) Bilateral:;Intact  -KG       Lumbar ROM Screen- Lower Quarter Clearing    Lumbar Flexion Normal  -KG    Lumbar Extension Normal  -KG    Lumbar Lateral Flexion Normal  -KG    Lumbar Rotation Normal  -KG       SI/Hip Screen- Lower Quarter Clearing    ASIS compression Bilateral:;Negative  -KG    ASIS distraction Bilateral:;Negative  -KG    Livan's/Geoff's test Bilateral:;Negative  -KG    Posterior thigh sheer Bilateral:;Negative  -KG       Special Tests/Palpation    Special Tests/Palpation Lumbar/SI;Hip  -KG       Lumbosacral Palpation    Lumbosacral Palpation? Yes  -KG    Lumbosacral Palpation Comments Denies TTP at lumbar parapsinals/lumbar area. TTP along R sacral border  -KG       Hip/Thigh Palpation    Hip/Thigh Palpation? Yes  -KG    Gluteus Medius Right:;Tender;Guarded/taut  -KG    Piriformis Right:;Tender;Guarded/taut  -KG    Quadriceps Right:;Tender  laterally  -KG       Hip Accessory Motions    Hip Accessory Motions Tested? Yes  -KG    Lateral distraction WNL  -KG    Anterior glide WNL  -KG    Posterior glide WNL  -KG       Hip Special Tests    Charlie test (tightness of ITB) Bilateral:;Negative  -KG    Piriformis test (piriformis syndrome) Right:;Positive;Left:;Negative  -KG       General ROM    GENERAL ROM COMMENTS Passive hip IR elicited pain in post hip. Otherwise passive hip ROM bilateral WNL. Bilateral knee ext/flex WNL. Hip flex AROM HL: L 110 deg, R 105 deg  -KG       MMT (Manual Muscle Testing)    Rt Lower Ext Rt Hip Flexion;Rt Hip ABduction;Rt Hip ADduction;Rt Knee Extension;Rt Knee Flexion;Rt Ankle Plantarflexion;Rt Ankle Dorsiflexion  -KG    Lt Lower Ext Lt Hip Flexion;Lt Hip ABduction;Lt Hip ADduction;Lt Knee Extension;Lt Knee Flexion;Lt Ankle Plantarflexion;Lt Ankle Dorsiflexion  -KG       MMT Right Lower Ext    Rt Hip Flexion MMT, Gross Movement (4+/5) good plus  -KG    Rt Hip  ABduction MMT, Gross Movement (4/5) good  -KG    Rt Hip ADduction MMT, Gross Movement (4+/5) good plus  -KG    Rt Knee Extension MMT, Gross Movement (5/5) normal  -KG    Rt Knee Flexion MMT, Gross Movement (5/5) normal  -KG    Rt Ankle Plantarflexion MMT, Gross Movement (5/5) normal  -KG    Rt Ankle Dorsiflexion MMT, Gross Movement (5/5) normal  -KG       MMT Left Lower Ext    Lt Hip Flexion MMT, Gross Movement (5/5) normal  -KG    Lt Hip ABduction MMT, Gross Movement (5/5) normal  -KG    Lt Hip ADduction MMT, Gross Movement (5/5) normal  -KG    Lt Knee Extension MMT, Gross Movement (5/5) normal  -KG    Lt Knee Flexion MMT, Gross Movement (5/5) normal  -KG    Lt Ankle Plantarflexion MMT, Gross Movement (5/5) normal  -KG    Lt Ankle Dorsiflexion MMT, Gross Movement (5/5) normal  -KG       Sensation    Sensation WNL? WFL  -KG    Light Touch No apparent deficits  -KG       Flexibility    Flexibility Tested? Lower Extremity  -KG       Lower Extremity Flexibility    Hamstrings Bilateral:;Mildly limited  -KG    Hip Flexors Mildly limited  -KG    Hip External Rotators Right:;Mildly limited  -KG    Hip Internal Rotators Right:;Mildly limited  -KG       Transfers    Comment, (Transfers) Cues for log roll fo sup<>sit transfer  -KG       Gait/Stairs (Locomotion)    Comment, (Gait/Stairs) Ambulates with no AD. Overall non-antalgic. Good otilia. Equal step length & stance time  -KG          User Key  (r) = Recorded By, (t) = Taken By, (c) = Cosigned By    Initials Name Provider Type    KG Ana Armstrong, PT Physical Therapist                              Therapy Education  Education Details: POC education. HEP: see exercise flowsheet for details. Did discuss trying ice  Given: HEP, Symptoms/condition management, Pain management, Posture/body mechanics  Program: New  How Provided: Verbal, Demonstration, Written  Provided to: Patient  Level of Understanding: Teach back education performed, Verbalized, Demonstrated      PT OP  "Goals     Row Name 10/12/22 1500          PT Short Term Goals    STG Date to Achieve 11/02/22  -KG     STG 1 Demonstrate independence/compliance in performance of initial HEP  -KG     STG 2 Demontrate improved seated PN/postural positioning with seated core/LE strengthening activities with min cues required for correction  -KG     STG 3 Demonstrate independence in performance of isometric TA contraction in various positions for functional carryover into daily activity performance  -KG     STG 4 Demonstrate improved R hip abd MMT to 4+/5  -KG     STG 5 Subjectively report no radiating pain distal to the hip with functional activities around the home  -KG        Long Term Goals    LTG Date to Achieve 11/23/22  -KG     LTG 1 Subjectively report 50% overall improvement in pain symptoms and functional activity tolerance  -KG     LTG 2 Demonstrate improved R hip MMT to 5/5 in all planes  -KG     LTG 3 Ascend/descend training steps (4\"/6\") 1x6 with reciprocal pattern, no increased pain, good eccentric control; single handrail prn  -KG     LTG 4 Tolerate 15-20 minutes of standing functional stabilization activities throughout treatment session without increased pain/symptoms  -KG     LTG 5 Demonstrate independence/understanding in final HEP for continued management/improvement in pain/symptoms  -KG        Time Calculation    PT Goal Re-Cert Due Date 11/02/22  -KG           User Key  (r) = Recorded By, (t) = Taken By, (c) = Cosigned By    Initials Name Provider Type    KG Ana Armstrong, PT Physical Therapist                 PT Assessment/Plan     Row Name 10/12/22 1500          PT Assessment    Functional Limitations Limitation in home management;Limitations in community activities;Performance in leisure activities;Performance in self-care ADL;Impaired gait  -KG     Impairments Gait;Impaired flexibility;Impaired muscle endurance;Muscle strength;Pain;Range of motion  -KG     Assessment Comments Pt is a 72 y.o. female " present with acute R post hip pain that is limiting performance of functional mobility & daily activities. Negative neurotension present in BLE's. Pt denies any low back pain, good overall trunk ROM noted. Pain mostly localized to posterolateral hip. Quite TTP & taut to palpation at R glutes/piriformis. Overall hip AROM is WFL but some pain with passive IR. Weakness noted in R glutes/abductors on R vs L. Demonstrated fair/good iso TA contraction/recruitment with cues. Did have malalignment at pelvis but corrected well with MET. Did well with initial therex activities for HEP adminstration. Pt needs work on functional hip strength/stability, core stability, flexibility, and functional activity tolerance & will benefit from skilled PT services.  -KG     Rehab Potential Good  -KG     Patient/caregiver participated in establishment of treatment plan and goals Yes  -KG     Patient would benefit from skilled therapy intervention Yes  -KG        PT Plan    PT Frequency 2x/week  -KG     Predicted Duration of Therapy Intervention (PT) 12 visits  -KG     Physical Therapy Interventions (Optional Details) home exercise program;lumbar stabilization;manual therapy techniques;modalities;neuromuscular re-education;patient/family education;ROM (Range of Motion);stretching;strengthening  -KG     PT Plan Comments Work on overall functional hip strength/stability, core and postural stabilization activities. LE/hip stretching. Posture/ awareness. Continue to monitor alignement. Check sacrum prone next visit. Continue manual/soft tissue work post hip musculature.  -KG           User Key  (r) = Recorded By, (t) = Taken By, (c) = Cosigned By    Initials Name Provider Type    KG Ana Armstrong, PT Physical Therapist                   OP Exercises     Row Name 10/12/22 1500             Subjective Comments    Subjective Comments Refer to therapy pt history for details  -KG         Subjective Pain    Able to rate subjective  "pain? yes  -KG      Pre-Treatment Pain Level 5  -KG      Post-Treatment Pain Level 5  -KG         Exercise 1    Exercise Name 1 SKTC stretch  -KG      Cueing 1 Verbal  -KG      Reps 1 1  -KG      Time 1 30\" hold  -KG         Exercise 2    Exercise Name 2 HL piriformis stretch  -KG      Cueing 2 Verbal  -KG      Reps 2 1  -KG      Time 2 30\" hold  -KG         Exercise 3    Exercise Name 3 HL iso TA/kegel education  -KG         Exercise 4    Exercise Name 4 Bridges with TA/kegel  -KG      Cueing 4 Verbal  -KG      Sets 4 1  -KG      Reps 4 10  -KG      Time 4 3-5\" hold  -KG         Exercise 5    Exercise Name 5 SL clamshells  -KG      Cueing 5 Verbal  -KG      Sets 5 1  -KG      Reps 5 10  -KG         Exercise 6    Exercise Name 6 Seated figure 4 stretch  -KG      Cueing 6 Verbal  -KG      Reps 6 1  -KG      Time 6 30\" hold  -KG         Exercise 7    Exercise Name 7 Seated hamstring stretch  -KG      Cueing 7 Verbal  -KG      Reps 7 1  -KG      Time 7 30\" hold  -KG            User Key  (r) = Recorded By, (t) = Taken By, (c) = Cosigned By    Initials Name Provider Type    KG Ana Armstrong, PT Physical Therapist                    Manual Rx (last 36 hours)    Manual Treatments     Row Name 10/12/22 1500             Manual Rx 1    Manual Rx 1 Location L hamstring, R hip flexor  -KG      Manual Rx 1 Type MET performed separately  -KG         Manual Rx 2    Manual Rx 2 Location RLE/QL  -KG      Manual Rx 2 Type LAD  -KG         Manual Rx 3    Manual Rx 3 Location L SL: R post hip musculature/piriformis  -KG      Manual Rx 3 Type STM/MFR  -KG           User Key  (r) = Recorded By, (t) = Taken By, (c) = Cosigned By    Initials Name Provider Type    KG Ana Armstrong, PT Physical Therapist                         Outcome Measure Options: Lower Extremity Functional Scale (LEFS)  Lower Extremity Functional Index  Any of your usual work, housework or school activities: A little bit of difficulty  Your usual hobbies, " recreational or sporting activities: Quite a bit of difficulty  Getting into or out of the bath: No difficulty  Walking between rooms: Moderate difficulty  Putting on your shoes or socks: A little bit of difficulty  Squatting: Moderate difficulty  Lifting an object, like a bag of groceries from the floor: No difficulty  Performing light activities around your home: Moderate difficulty  Performing heavy activities around your home: Quite a bit of difficulty  Getting into or out of a car: Quite a bit of difficulty  Walking 2 blocks: Quite a bit of difficulty  Walking a mile: Quite a bit of difficulty  Going up or down 10 stairs (about 1 flight of stairs): Moderate difficulty  Standing for 1 hour: Moderate difficulty  Sitting for 1 hour: No difficulty  Running on even ground: A little bit of difficulty  Running on uneven ground: A little bit of difficulty  Making sharp turns while running fast: A little bit of difficulty  Hopping: A little bit of difficulty  Rolling over in bed: A little bit of difficulty  Total: 48      Time Calculation:   Start Time: 1521  Stop Time: 1602  Time Calculation (min): 41 min  Total Timed Code Minutes- PT: 17 minute(s)     Therapy Charges for Today     Code Description Service Date Service Provider Modifiers Qty    80409415447 HC PT EVAL LOW COMPLEXITY 2 10/12/2022 Ana Armstrong, PT GP 1    37764034938 HC PT THER PROC EA 15 MIN 10/12/2022 Ana Armstrong, PT GP 1          PT G-Codes  Outcome Measure Options: Lower Extremity Functional Scale (LEFS)  Total: 48         Ana Armstrong PT  10/12/2022

## 2022-10-18 ENCOUNTER — HOSPITAL ENCOUNTER (OUTPATIENT)
Dept: PHYSICAL THERAPY | Facility: HOSPITAL | Age: 72
Setting detail: THERAPIES SERIES
Discharge: HOME | End: 2022-10-18

## 2022-10-18 DIAGNOSIS — M25.551 RIGHT HIP PAIN: Primary | ICD-10-CM

## 2022-10-18 PROCEDURE — 97140 MANUAL THERAPY 1/> REGIONS: CPT

## 2022-10-18 PROCEDURE — 97110 THERAPEUTIC EXERCISES: CPT

## 2022-10-18 NOTE — THERAPY TREATMENT NOTE
Outpatient Physical Therapy Ortho Treatment Note  [facility]     Patient Name: Naina Manning  : 1950  MRN: [id number]  Today's Date: 10/18/2022      Visit Date: 10/18/2022    Attendance: 2 visits  Subjective improvement: N/A  MD appt: 12/15/22  Recheck due: 22    Visit Dx:    ICD-10-CM ICD-9-CM   1. Right hip pain  M25.551 719.45       Patient Active Problem List   Diagnosis   • Body mass index (BMI) of 25.0 to 29.9   • Essential hypertension   • Hypertension   • Hypothyroid   • Hypothyroidism   • Seasonal allergic rhinitis        Past Medical History:   Diagnosis Date   • Arthritis    • Hyperlipidemia    • Hypertension    • Hypothyroidism         Past Surgical History:   Procedure Laterality Date   • CHOLECYSTECTOMY     • COLONOSCOPY  10/04/2016    [facility] – [place] - DR KING   • EYE SURGERY     • SALPINGO OOPHORECTOMY Bilateral 2019    Procedure: Bilateral SALPINGO OOPHORECTOMY LAPAROSCOPIC;  Surgeon: Aashish Ty MD;  Location: [facility] OR;  Service: Obstetrics/Gynecology   • TUBAL ABDOMINAL LIGATION          PT Ortho     Row Name 10/18/22 09       Precautions and Contraindications    Precautions/Limitations no known precautions/limitations  -PM       Subjective Pain    Post-Treatment Pain Level 3  -PM       Posture/Observations    Posture/Observations Comments L IC elevated with LLD - corrected with LAD. R ASIS inf to L - corrected w/MET  -PM          User Key  (r) = Recorded By, (t) = Taken By, (c) = Cosigned By    Initials Name Provider Type    Miriam Rousseau PTA Physical Therapist Assistant                             PT Assessment/Plan     Row Name 10/18/22 09          PT Assessment    Assessment Comments Pt continued with asymmetry at pelvis which corrected with manual; decreasing R hip pain per patient report. Needed most cues for TA and SL clam. Need to progress strength some as margo nxt. Noted manual quad S sidelying which also caused some discomfort L LBP.  "There was also some bumpiness of IT band.  -PM        PT Plan    PT Frequency 2x/week  -PM     Predicted Duration of Therapy Intervention (PT) 12 visits  -PM     PT Plan Comments instruct mod quad S standing; ck hip flexors; possible SL hip abd SLR  -PM           User Key  (r) = Recorded By, (t) = Taken By, (c) = Cosigned By    Initials Name Provider Type    PM Miriam Gusman PTA Physical Therapist Assistant                 Modalities     Row Name 10/18/22 0900             Subjective Comments    Subjective Comments Pt states stretches are helping and intensity of pain is down.  -PM         Subjective Pain    Able to rate subjective pain? yes  -PM      Pre-Treatment Pain Level 3  -PM            User Key  (r) = Recorded By, (t) = Taken By, (c) = Cosigned By    Initials Name Provider Type    PM Miriam Gusman PTA Physical Therapist Assistant               OP Exercises     Row Name 10/18/22 0900             Subjective Comments    Subjective Comments Pt states stretches are helping and intensity of pain is down.  -PM         Subjective Pain    Able to rate subjective pain? yes  -PM      Pre-Treatment Pain Level 3  -PM      Post-Treatment Pain Level 3  -PM         Exercise 2    Exercise Name 2 seated HS S chair/stool  -PM      Cueing 2 Verbal  -PM      Reps 2 2  -PM      Time 2 30\"  -PM         Exercise 3    Exercise Name 3 seated fig 4 S  -PM      Cueing 3 Verbal  -PM      Reps 3 2  -PM      Time 3 30\"  -PM         Exercise 4    Exercise Name 4 omitted  -PM      Cueing 4 --  -PM      Sets 4 --  -PM      Reps 4 --  -PM         Exercise 5    Exercise Name 5 SKTC S  -PM      Reps 5 2  -PM      Time 5 30\"  -PM         Exercise 6    Exercise Name 6 HL piriformis S  -PM      Reps 6 2  -PM      Time 6 30\"  -PM         Exercise 7    Exercise Name 7 bridge with iso TA  -PM      Sets 7 1  -PM      Reps 7 15  -PM         Exercise 8    Exercise Name 8 SL clam w/TA  -PM      Reps 8 15  -PM         Exercise 9    Exercise " Name 9 prone heel squeeze - IR for IS  -PM      Cueing 9 Verbal  -PM      Sets 9 1  -PM      Reps 9 10  -PM         Exercise 10    Exercise Name 10 prone TKE/GS  -PM      Cueing 10 Verbal  -PM      Sets 10 1  -PM      Reps 10 15  -PM      Time 10 5 ct hold  -PM            User Key  (r) = Recorded By, (t) = Taken By, (c) = Cosigned By    Initials Name Provider Type    PM Miriam Gusman PTA Physical Therapist Assistant                         Manual Rx (last 36 hours)     Manual Treatments     Row Name 10/18/22 0900             Manual Rx 1    Manual Rx 1 Location R HS and L hip flexor  -PM      Manual Rx 1 Type MET performed simultaneous  -PM         Manual Rx 2    Manual Rx 2 Location Iliac yvette L elevation  -PM      Manual Rx 2 Type LAD L LE  -PM         Manual Rx 3    Manual Rx 3 Location L SL: R post hip musculature/piriformis; ITband  -PM      Manual Rx 3 Type IASTM/MFR  pt had body suit on  -PM            User Key  (r) = Recorded By, (t) = Taken By, (c) = Cosigned By    Initials Name Provider Type    PM Miriam Gusman PTA Physical Therapist Assistant                 PT OP Goals     Row Name 10/18/22 0900          PT Short Term Goals    STG Date to Achieve 11/02/22  -PM     STG 1 Demonstrate independence/compliance in performance of initial HEP  -PM     STG 2 Demontrate improved seated PN/postural positioning with seated core/LE strengthening activities with min cues required for correction  -PM     STG 3 Demonstrate independence in performance of isometric TA contraction in various positions for functional carryover into daily activity performance  -PM     STG 4 Demonstrate improved R hip abd MMT to 4+/5  -PM     STG 5 Subjectively report no radiating pain distal to the hip with functional activities around the home  -PM        Long Term Goals    LTG Date to Achieve 11/23/22  -PM     LTG 1 Subjectively report 50% overall improvement in pain symptoms and functional activity tolerance  -PM     LTG 2  "Demonstrate improved R hip MMT to 5/5 in all planes  -PM     LTG 3 Ascend/descend training steps (4\"/6\") 1x6 with reciprocal pattern, no increased pain, good eccentric control; single handrail prn  -PM     LTG 4 Tolerate 15-20 minutes of standing functional stabilization activities throughout treatment session without increased pain/symptoms  -PM     LTG 5 Demonstrate independence/understanding in final HEP for continued management/improvement in pain/symptoms  -PM        Time Calculation    PT Goal Re-Cert Due Date 11/02/22  -PM           User Key  (r) = Recorded By, (t) = Taken By, (c) = Cosigned By    Initials Name Provider Type    PM Miriam Gusman PTA Physical Therapist Assistant                Therapy Education  Given: HEP, Symptoms/condition management, Pain management, Posture/body mechanics  Program: Reinforced  How Provided: Verbal, Demonstration  Provided to: Patient  Level of Understanding: Teach back education performed, Verbalized, Demonstrated              Time Calculation:   Start Time: 0935  Stop Time: 1015  Time Calculation (min): 40 min  Therapy Charges for Today     Code Description Service Date Service Provider Modifiers Qty    85020844839 HC PT MANUAL THERAPY EA 15 MIN 10/18/2022 Miriam Gusman PTA GP, CQ 1    68100230648 HC PT THER PROC EA 15 MIN 10/18/2022 Miriam Gusman PTA GP, CQ 2                    Miriam Gusman PTA  10/18/2022     "

## 2022-10-18 NOTE — THERAPY TREATMENT NOTE
"    Outpatient Physical Therapy Ortho Treatment Note  [facility]     Patient Name: Naina Manning  : 1950  MRN: [id number]  Today's Date: 10/18/2022      Visit Date: 10/18/2022    Attendance: 2 visits  Subjective improvement: \"stretches help\"  MD appt: 12/15/2022  Recheck due: 2022    Visit Dx:    ICD-10-CM ICD-9-CM   1. Right hip pain  M25.551 719.45       Patient Active Problem List   Diagnosis   • Body mass index (BMI) of 25.0 to 29.9   • Essential hypertension   • Hypertension   • Hypothyroid   • Hypothyroidism   • Seasonal allergic rhinitis        Past Medical History:   Diagnosis Date   • Arthritis    • Hyperlipidemia    • Hypertension    • Hypothyroidism         Past Surgical History:   Procedure Laterality Date   • CHOLECYSTECTOMY     • COLONOSCOPY  10/04/2016    [facility] [place] - DR KING   • EYE SURGERY     • SALPINGO OOPHORECTOMY Bilateral 2019    Procedure: Bilateral SALPINGO OOPHORECTOMY LAPAROSCOPIC;  Surgeon: Aashish Ty MD;  Location: [facility];  Service: Obstetrics/Gynecology   • TUBAL ABDOMINAL LIGATION          PT Ortho     Row Name 10/18/22 0900       Precautions and Contraindications    Precautions/Limitations no known precautions/limitations  -PM       Subjective Pain    Post-Treatment Pain Level 3  -PM       Posture/Observations    Posture/Observations Comments L IC elevated with LLD - corrected with LAD. R ASIS inf to L - corrected w/MET  -PM          User Key  (r) = Recorded By, (t) = Taken By, (c) = Cosigned By    Initials Name Provider Type    PM Miriam Gusman PTA Physical Therapist Assistant                             PT Assessment/Plan     Row Name 10/18/22 0900          PT Assessment    Assessment Comments Pt continued with asymmetry at pelvis which corrected with manual; decreasing R hip pain per patient report. Needed most cues for TA and SL clam. Need to progress strength some as margo nxt. Noted manual quad S sidelying which also caused some " "discomfort L LBP. There was also some bumpiness of IT band.  -PM        PT Plan    PT Frequency 2x/week  -PM     Predicted Duration of Therapy Intervention (PT) 12 visits  -PM     PT Plan Comments instruct mod quad S standing; ck hip flexors; possible SL hip abd SLR  -PM           User Key  (r) = Recorded By, (t) = Taken By, (c) = Cosigned By    Initials Name Provider Type    PM Miriam Gusman PTA Physical Therapist Assistant                 Modalities     Row Name 10/18/22 0900             Subjective Comments    Subjective Comments Pt states stretches are helping and intensity of pain is down.  -PM         Subjective Pain    Able to rate subjective pain? yes  -PM      Pre-Treatment Pain Level 3  -PM            User Key  (r) = Recorded By, (t) = Taken By, (c) = Cosigned By    Initials Name Provider Type    PM Miriam Gusman PTA Physical Therapist Assistant               OP Exercises     Row Name 10/18/22 0900             Subjective Comments    Subjective Comments Pt states stretches are helping and intensity of pain is down.  -PM         Subjective Pain    Able to rate subjective pain? yes  -PM      Pre-Treatment Pain Level 3  -PM      Post-Treatment Pain Level 3  -PM         Exercise 2    Exercise Name 2 seated HS S chair/stool  -PM      Cueing 2 Verbal  -PM      Reps 2 2  -PM      Time 2 30\"  -PM         Exercise 3    Exercise Name 3 seated fig 4 S  -PM      Cueing 3 Verbal  -PM      Reps 3 2  -PM      Time 3 30\"  -PM         Exercise 4    Exercise Name 4 omitted  -PM      Cueing 4 --  -PM      Sets 4 --  -PM      Reps 4 --  -PM         Exercise 5    Exercise Name 5 SKTC S  -PM      Reps 5 2  -PM      Time 5 30\"  -PM         Exercise 6    Exercise Name 6 HL piriformis S  -PM      Reps 6 2  -PM      Time 6 30\"  -PM         Exercise 7    Exercise Name 7 bridge with iso TA  -PM      Sets 7 1  -PM      Reps 7 15  -PM         Exercise 8    Exercise Name 8 SL clam w/TA  -PM      Reps 8 15  -PM         " Exercise 9    Exercise Name 9 prone heel squeeze - IR for IS  -PM      Cueing 9 Verbal  -PM      Sets 9 1  -PM      Reps 9 10  -PM         Exercise 10    Exercise Name 10 prone TKE/GS  -PM      Cueing 10 Verbal  -PM      Sets 10 1  -PM      Reps 10 15  -PM      Time 10 5 ct hold  -PM            User Key  (r) = Recorded By, (t) = Taken By, (c) = Cosigned By    Initials Name Provider Type    PM Miriam Gusman PTA Physical Therapist Assistant                         Manual Rx (last 36 hours)     Manual Treatments     Row Name 10/18/22 0900             Manual Rx 1    Manual Rx 1 Location R HS and L hip flexor  -PM      Manual Rx 1 Type MET performed simultaneous  -PM         Manual Rx 2    Manual Rx 2 Location Iliac yvette L elevation  -PM      Manual Rx 2 Type LAD L LE  -PM         Manual Rx 3    Manual Rx 3 Location L SL: R post hip musculature/piriformis; ITband  -PM      Manual Rx 3 Type IASTM/MFR  pt had body suit on  -PM            User Key  (r) = Recorded By, (t) = Taken By, (c) = Cosigned By    Initials Name Provider Type    PM Miriam Gusman PTA Physical Therapist Assistant                 PT OP Goals     Row Name 10/18/22 0900          PT Short Term Goals    STG Date to Achieve 11/02/22  -PM     STG 1 Demonstrate independence/compliance in performance of initial HEP  -PM     STG 2 Demontrate improved seated PN/postural positioning with seated core/LE strengthening activities with min cues required for correction  -PM     STG 3 Demonstrate independence in performance of isometric TA contraction in various positions for functional carryover into daily activity performance  -PM     STG 4 Demonstrate improved R hip abd MMT to 4+/5  -PM     STG 5 Subjectively report no radiating pain distal to the hip with functional activities around the home  -PM        Long Term Goals    LTG Date to Achieve 11/23/22  -PM     LTG 1 Subjectively report 50% overall improvement in pain symptoms and functional activity  "tolerance  -PM     LTG 2 Demonstrate improved R hip MMT to 5/5 in all planes  -PM     LTG 3 Ascend/descend training steps (4\"/6\") 1x6 with reciprocal pattern, no increased pain, good eccentric control; single handrail prn  -PM     LTG 4 Tolerate 15-20 minutes of standing functional stabilization activities throughout treatment session without increased pain/symptoms  -PM     LTG 5 Demonstrate independence/understanding in final HEP for continued management/improvement in pain/symptoms  -PM        Time Calculation    PT Goal Re-Cert Due Date 11/02/22  -PM           User Key  (r) = Recorded By, (t) = Taken By, (c) = Cosigned By    Initials Name Provider Type    PM Miriam Gusman PTA Physical Therapist Assistant                Therapy Education  Given: HEP, Symptoms/condition management, Pain management, Posture/body mechanics  Program: Reinforced  How Provided: Verbal, Demonstration  Provided to: Patient  Level of Understanding: Teach back education performed, Verbalized, Demonstrated              Time Calculation:   Start Time: 0935  Stop Time: 1015  Time Calculation (min): 40 min  Therapy Charges for Today     Code Description Service Date Service Provider Modifiers Qty    87152805961 HC PT MANUAL THERAPY EA 15 MIN 10/18/2022 Miriam Gusman PTA GP, CQ 1    26897198560 HC PT THER PROC EA 15 MIN 10/18/2022 Miriam Gusman PTA GP, CQ 2                    Miriam Gusman PTA  10/18/2022     "

## 2022-10-21 ENCOUNTER — HOSPITAL ENCOUNTER (OUTPATIENT)
Dept: PHYSICAL THERAPY | Facility: HOSPITAL | Age: 72
Setting detail: THERAPIES SERIES
Discharge: HOME | End: 2022-10-21

## 2022-10-21 DIAGNOSIS — M25.551 RIGHT HIP PAIN: Primary | ICD-10-CM

## 2022-10-21 PROCEDURE — 97140 MANUAL THERAPY 1/> REGIONS: CPT | Performed by: PHYSICAL THERAPIST

## 2022-10-21 PROCEDURE — 97110 THERAPEUTIC EXERCISES: CPT | Performed by: PHYSICAL THERAPIST

## 2022-10-21 NOTE — THERAPY TREATMENT NOTE
"    Outpatient Physical Therapy Ortho Treatment Note  [facility]     Patient Name: Naina Manning  : 1950  MRN: [id number]  Today's Date: 10/21/2022      Visit Date: 10/21/2022     Attendance: 3 visits  Subjective improvement: \"getting better\"  MD appt: 12/15/2022  Recheck due: 2022    Visit Dx:    ICD-10-CM ICD-9-CM   1. Right hip pain  M25.551 719.45       Patient Active Problem List   Diagnosis   • Body mass index (BMI) of 25.0 to 29.9   • Essential hypertension   • Hypertension   • Hypothyroid   • Hypothyroidism   • Seasonal allergic rhinitis        Past Medical History:   Diagnosis Date   • Arthritis    • Hyperlipidemia    • Hypertension    • Hypothyroidism         Past Surgical History:   Procedure Laterality Date   • CHOLECYSTECTOMY     • COLONOSCOPY  10/04/2016    [facility] [place] KING   • EYE SURGERY     • SALPINGO OOPHORECTOMY Bilateral 2019    Procedure: Bilateral SALPINGO OOPHORECTOMY LAPAROSCOPIC;  Surgeon: Aashish Ty MD;  Location: [facility];  Service: Obstetrics/Gynecology   • TUBAL ABDOMINAL LIGATION          PT Ortho     Row Name 10/21/22 0800       Precautions and Contraindications    Precautions/Limitations no known precautions/limitations  -KG       Posture/Observations    Posture/Observations Comments Supine L IC superior vs R, L ASIS superior vs R. Both corrected wtih LAD & MET  -KG          User Key  (r) = Recorded By, (t) = Taken By, (c) = Cosigned By    Initials Name Provider Type    Ana Avalos, PT Physical Therapist                             PT Assessment/Plan     Row Name 10/21/22 0800          PT Assessment    Assessment Comments Pt did well with treatment. Less cues needed for TA performance with therex. Did try SL hip abd SLR but caused some increased pain so performed limited reps. Did well with standing quad stretch/foot on chair and added to HEP. Hip flexors minimally tight.  -KG        PT Plan    PT Frequency 2x/week  -KG     PT Plan " "Comments Progress to seated core and standing hip abd/ext SLR next. Continue manual and progressing strengthening.  -KG           User Key  (r) = Recorded By, (t) = Taken By, (c) = Cosigned By    Initials Name Provider Type    Ana Avalos, PT Physical Therapist                   OP Exercises     Row Name 10/21/22 0800             Subjective Comments    Subjective Comments Pt states she's still having less pain overall. Things are starting to get better.  -KG         Subjective Pain    Able to rate subjective pain? yes  -KG      Pre-Treatment Pain Level 2  -KG      Post-Treatment Pain Level --  \"feels better right now\"  -KG         Exercise 1    Exercise Name 1 Seated guillermina hamstring stretch wtih foot on stool  -KG      Cueing 1 Verbal  -KG      Reps 1 2  -KG      Time 1 30\" hold  -KG         Exercise 2    Exercise Name 2 seated fig 4 S  -KG      Cueing 2 Verbal  -KG      Reps 2 2  -KG      Time 2 30\" hold  -KG         Exercise 3    Exercise Name 3 Standing mod quad stretch on chair  -KG      Cueing 3 Verbal  -KG      Reps 3 2  -KG      Time 3 30\" hold  -KG         Exercise 4    Exercise Name 4 SKTC S  -KG      Cueing 4 Verbal  -KG      Sets 4 1  -KG      Reps 4 30\" hold  -KG         Exercise 5    Exercise Name 5 HL piriformis S  -KG      Reps 5 2  -KG      Time 5 30\" hold  -KG         Exercise 6    Exercise Name 6 Charlie hip flexor stretch; opp knee bent  -KG      Cueing 6 Verbal  -KG      Reps 6 1 ea LE  -KG      Time 6 30\" hold  -KG         Exercise 7    Exercise Name 7 Bridge iso add/TA  -KG      Cueing 7 Verbal  -KG      Sets 7 1  -KG      Reps 7 15  -KG         Exercise 8    Exercise Name 8 SL clam w/TA  -KG      Cueing 8 Verbal  -KG      Sets 8 1  -KG      Reps 8 15 ea LE  -KG         Exercise 9    Exercise Name 9 SL R hip abd SLR A/AA for support  -KG      Cueing 9 Verbal  -KG      Sets 9 1  -KG      Reps 9 10  -KG      Additional Comments had some increased pain  -KG         Exercise 10    Exercise " "Name 10 HL tband hip abd  -KG      Cueing 10 Verbal  -KG      Sets 10 2  -KG      Reps 10 10  -KG      Additional Comments red tband  -KG         Exercise 11    Exercise Name 11 prone heel squeeze - IR for IS  -KG      Cueing 11 Verbal  -KG      Sets 11 1  -KG      Reps 11 15  -KG      Additional Comments yellow tband  -KG         Exercise 12    Exercise Name 12 prone TKE/GS  -KG      Cueing 12 Verbal  -KG      Reps 12 1x15  -KG      Time 12 5\" hold  -KG            User Key  (r) = Recorded By, (t) = Taken By, (c) = Cosigned By    Initials Name Provider Type    MARKELL Ana Armstrong, PT Physical Therapist                         Manual Rx (last 36 hours)     Manual Treatments     Row Name 10/21/22 0800             Manual Rx 1    Manual Rx 1 Location R HS and L hip flexor  -KG      Manual Rx 1 Type MET performed simultaneous  -KG         Manual Rx 2    Manual Rx 2 Location Iliac crest L elevation  -KG      Manual Rx 2 Type LAD L LE  -KG         Manual Rx 3    Manual Rx 3 Location L SL: R post hip musculature/piriformis; ITband  -KG      Manual Rx 3 Type STM/MFR; biofreeze at end  -KG            User Key  (r) = Recorded By, (t) = Taken By, (c) = Cosigned By    Initials Name Provider Type    MARKELL Ana Armstrong, PT Physical Therapist                 PT OP Goals     Row Name 10/21/22 0800          PT Short Term Goals    STG Date to Achieve 11/02/22  -KG     STG 1 Demonstrate independence/compliance in performance of initial HEP  -KG     STG 1 Progress Progressing  -KG     STG 2 Demontrate improved seated PN/postural positioning with seated core/LE strengthening activities with min cues required for correction  -KG     STG 2 Progress Progressing  -KG     STG 3 Demonstrate independence in performance of isometric TA contraction in various positions for functional carryover into daily activity performance  -KG     STG 3 Progress Progressing  -KG     STG 4 Demonstrate improved R hip abd MMT to 4+/5  -KG     STG 4 Progress " "Progressing  -KG     STG 5 Subjectively report no radiating pain distal to the hip with functional activities around the home  -KG     STG 5 Progress Progressing  -KG        Long Term Goals    LTG Date to Achieve 11/23/22  -KG     LTG 1 Subjectively report 50% overall improvement in pain symptoms and functional activity tolerance  -KG     LTG 2 Demonstrate improved R hip MMT to 5/5 in all planes  -KG     LTG 3 Ascend/descend training steps (4\"/6\") 1x6 with reciprocal pattern, no increased pain, good eccentric control; single handrail prn  -KG     LTG 4 Tolerate 15-20 minutes of standing functional stabilization activities throughout treatment session without increased pain/symptoms  -KG     LTG 5 Demonstrate independence/understanding in final HEP for continued management/improvement in pain/symptoms  -KG        Time Calculation    PT Goal Re-Cert Due Date 11/02/22  -KG           User Key  (r) = Recorded By, (t) = Taken By, (c) = Cosigned By    Initials Name Provider Type    KG Ana Armstrong, PT Physical Therapist                Therapy Education  Education Details: standing quad stretch stool/chair, HL hip abd tband, prone TKE/glutes  Given: HEP, Pain management, Symptoms/condition management, Posture/body mechanics  Program: Reinforced, Progressed  How Provided: Verbal, Demonstration, Written  Provided to: Patient  Level of Understanding: Teach back education performed, Verbalized, Demonstrated              Time Calculation:   Start Time: 0802  Stop Time: 0849  Time Calculation (min): 47 min  Therapy Charges for Today     Code Description Service Date Service Provider Modifiers Qty    80872423550  PT THER PROC EA 15 MIN 10/21/2022 Ana Armstrong, PT GP 2    [id number]  PT MANUAL THERAPY EA 15 MIN 10/21/2022 Ana Armstrong, PT GP 1                    Ana Armstrong PT  10/21/2022     "

## 2022-10-25 ENCOUNTER — HOSPITAL ENCOUNTER (OUTPATIENT)
Dept: PHYSICAL THERAPY | Facility: HOSPITAL | Age: 72
Setting detail: THERAPIES SERIES
Discharge: HOME | End: 2022-10-25

## 2022-10-25 DIAGNOSIS — M25.551 RIGHT HIP PAIN: Primary | ICD-10-CM

## 2022-10-25 PROCEDURE — 97140 MANUAL THERAPY 1/> REGIONS: CPT

## 2022-10-25 PROCEDURE — 97110 THERAPEUTIC EXERCISES: CPT

## 2022-10-25 NOTE — THERAPY TREATMENT NOTE
"    Outpatient Physical Therapy Ortho Treatment Note  [facility]     Patient Name: Naina Manning  : 1950  MRN: [id number]  Today's Date: 10/25/2022      Visit Date: 10/25/2022    Attendance: 4 visits  Subjective improvement: \"improving I think but good and bad days\"  MD appt: 12/15/22  Recheck due: 22    Visit Dx:    ICD-10-CM ICD-9-CM   1. Right hip pain  M25.551 719.45       Patient Active Problem List   Diagnosis   • Body mass index (BMI) of 25.0 to 29.9   • Essential hypertension   • Hypertension   • Hypothyroid   • Hypothyroidism   • Seasonal allergic rhinitis        Past Medical History:   Diagnosis Date   • Arthritis    • Hyperlipidemia    • Hypertension    • Hypothyroidism         Past Surgical History:   Procedure Laterality Date   • CHOLECYSTECTOMY     • COLONOSCOPY  10/04/2016    [facility] [place] - DR KING   • EYE SURGERY     • SALPINGO OOPHORECTOMY Bilateral 2019    Procedure: Bilateral SALPINGO OOPHORECTOMY LAPAROSCOPIC;  Surgeon: Aashish Ty MD;  Location: [facility];  Service: Obstetrics/Gynecology   • TUBAL ABDOMINAL LIGATION          PT Ortho     Row Name 10/25/22 0800       Subjective Comments    Subjective Comments Pt states no pain this a.m. but has had a few days since last treatment where she did have quite a bit of pain.  -PM       Precautions and Contraindications    Precautions/Limitations no known precautions/limitations  -PM       Subjective Pain    Able to rate subjective pain? yes  -PM    Pre-Treatment Pain Level 0  -PM    Subjective Pain Comment \"not really pain, just tell I've been working\"  -PM       Posture/Observations    Posture/Observations Comments supine L IC elevated - corrects with LAD. ASIS level.  -PM          User Key  (r) = Recorded By, (t) = Taken By, (c) = Cosigned By    Initials Name Provider Type    PM Miriam Gusman PTA Physical Therapist Assistant                             PT Assessment/Plan     Row Name 10/25/22 0800          PT " "Assessment    Assessment Comments Pt cont to do pretty well overall; still has intermittent pain; ASIS level today; more cues for standing posture and stability exc; really tends to ER LE's for hip abd and over kick which incr QL tightness today.Multiple cues given.  -PM        PT Plan    PT Frequency 2x/week  -PM     PT Plan Comments possible side stepping; improve standing stability and seated stability; monitor IS/SI  -PM           User Key  (r) = Recorded By, (t) = Taken By, (c) = Cosigned By    Initials Name Provider Type    PM Miriam Gusman, GERMÁN Physical Therapist Assistant                   OP Exercises     Row Name 10/25/22 0800             Subjective Comments    Subjective Comments Pt states no pain this a.m. but has had a few days since last treatment where she did have quite a bit of pain.  -PM         Subjective Pain    Able to rate subjective pain? yes  -PM      Pre-Treatment Pain Level 0  -PM      Subjective Pain Comment \"not really pain, just tell I've been working\"  -PM         Exercise 1    Exercise Name 1 Seated guillermina hamstring stretch wtih foot on stool  -PM      Cueing 1 Verbal  -PM      Reps 1 2  -PM      Time 1 30\" hold  -PM         Exercise 2    Exercise Name 2 seated fig 4 S  -PM      Cueing 2 Verbal  -PM      Reps 2 2  -PM      Time 2 30\" hold  -PM      Additional Comments no pressure just lean forward  -PM         Exercise 3    Exercise Name 3 Standing mod quad stretch on chair  -PM      Cueing 3 Verbal  -PM      Reps 3 2 R; 1 L  -PM      Time 3 30\" hold  -PM         Exercise 4    Exercise Name 4 SKTC S  -PM      Cueing 4 Verbal  -PM      Sets 4 2  -PM      Reps 4 30\" hold  -PM         Exercise 5    Exercise Name 5 HL piriformis S  -PM      Reps 5 2  -PM      Time 5 30\" hold  -PM         Exercise 6    Exercise Name 6 Charlie hip flexor stretch; opp knee bent  -PM      Cueing 6 Verbal  -PM      Reps 6 1 ea LE  -PM      Time 6 30\" hold  -PM         Exercise 7    Exercise Name 7 Bridge iso " "add/TA  -PM      Cueing 7 Verbal  -PM      Sets 7 1  -PM      Reps 7 15  -PM         Exercise 8    Exercise Name 8 SL clam w/TA  -PM      Cueing 8 Verbal  -PM      Sets 8 1  -PM      Reps 8 15 ea LE  -PM         Exercise 9    Exercise Name 9 SL R hip abd SLR A/AA for support  -PM      Cueing 9 Verbal  -PM      Sets 9 1  -PM      Reps 9 10  -PM         Exercise 10    Exercise Name 10 seated instr PN TA  -PM      Cueing 10 Verbal;Tactile  -PM      Sets 10 2  -PM      Reps 10 10  -PM      Additional Comments red  -PM         Exercise 11    Exercise Name 11 prone heel squeeze - IR for IS  -PM      Cueing 11 Verbal  -PM      Sets 11 1  -PM      Reps 11 15  -PM      Additional Comments peach tband loop  -PM         Exercise 12    Exercise Name 12 prone TKE/GS  -PM      Cueing 12 Verbal  -PM      Reps 12 1x15  -PM      Time 12 5\" hold  -PM         Exercise 13    Exercise Name 13 standing march w/TA and posture  -PM      Cueing 13 Verbal  -PM      Reps 13 15  -PM         Exercise 14    Exercise Name 14 standing hip abd w/TA and posture  -PM      Cueing 14 Verbal;Demo  -PM      Sets 14 1  -PM      Reps 14 15  -PM         Exercise 15    Exercise Name 15 standing hip ext w/TA and posture  -PM      Cueing 15 Verbal  -PM      Sets 15 1  -PM      Reps 15 15  -PM            User Key  (r) = Recorded By, (t) = Taken By, (c) = Cosigned By    Initials Name Provider Type    PM Miriam Gusman PTA Physical Therapist Assistant                         Manual Rx (last 36 hours)     Manual Treatments     Row Name 10/25/22 0800             Manual Rx 1    Manual Rx 1 Location pelvis/L QL  -PM      Manual Rx 1 Type L LAD  -PM         Manual Rx 2    Manual Rx 2 Location R posterolateral hip/LS and IT band  -PM      Manual Rx 2 Type MFR/STM/IASTM  -PM            User Key  (r) = Recorded By, (t) = Taken By, (c) = Cosigned By    Initials Name Provider Type    PM Miriam Gusman PTA Physical Therapist Assistant                 PT OP Goals " "    Row Name 10/25/22 0800          PT Short Term Goals    STG Date to Achieve 11/02/22  -PM     STG 1 Demonstrate independence/compliance in performance of initial HEP  -PM     STG 1 Progress Progressing;Partially Met  -PM     STG 2 Demontrate improved seated PN/postural positioning with seated core/LE strengthening activities with min cues required for correction  -PM     STG 2 Progress Progressing  -PM     STG 3 Demonstrate independence in performance of isometric TA contraction in various positions for functional carryover into daily activity performance  -PM     STG 3 Progress Progressing  -PM     STG 4 Demonstrate improved R hip abd MMT to 4+/5  -PM     STG 4 Progress Progressing  -PM     STG 5 Subjectively report no radiating pain distal to the hip with functional activities around the home  -PM     STG 5 Progress Progressing  -PM        Long Term Goals    LTG Date to Achieve 11/23/22  -PM     LTG 1 Subjectively report 50% overall improvement in pain symptoms and functional activity tolerance  -PM     LTG 2 Demonstrate improved R hip MMT to 5/5 in all planes  -PM     LTG 3 Ascend/descend training steps (4\"/6\") 1x6 with reciprocal pattern, no increased pain, good eccentric control; single handrail prn  -PM     LTG 4 Tolerate 15-20 minutes of standing functional stabilization activities throughout treatment session without increased pain/symptoms  -PM     LTG 5 Demonstrate independence/understanding in final HEP for continued management/improvement in pain/symptoms  -PM        Time Calculation    PT Goal Re-Cert Due Date 11/02/22  -PM           User Key  (r) = Recorded By, (t) = Taken By, (c) = Cosigned By    Initials Name Provider Type    Miriam Rousseau, GERM[place]N Physical Therapist Assistant                Therapy Education  Given: HEP, Pain management, Symptoms/condition management, Posture/body mechanics  Program: Reinforced, Progressed  How Provided: Verbal, Demonstration, Written  Provided to: " Patient  Level of Understanding: Teach back education performed, Verbalized, Demonstrated              Time Calculation:   Start Time: 0800  Stop Time: 0850  Time Calculation (min): 50 min  Therapy Charges for Today     Code Description Service Date Service Provider Modifiers Qty    04234368848 HC PT MANUAL THERAPY EA 15 MIN 10/25/2022 Miriam Gusman PTA GP, CQ 1    81920755616 HC PT THER PROC EA 15 MIN 10/25/2022 Miriam Gusman PTA GP, CQ 2                    Miriam Gusman PTA  10/25/2022

## 2022-10-26 ENCOUNTER — HOSPITAL ENCOUNTER (OUTPATIENT)
Dept: PHYSICAL THERAPY | Facility: HOSPITAL | Age: 72
Setting detail: THERAPIES SERIES
Discharge: HOME | End: 2022-10-26

## 2022-10-26 DIAGNOSIS — M25.551 RIGHT HIP PAIN: Primary | ICD-10-CM

## 2022-10-26 PROCEDURE — 97140 MANUAL THERAPY 1/> REGIONS: CPT | Performed by: PHYSICAL THERAPIST

## 2022-10-26 PROCEDURE — 97110 THERAPEUTIC EXERCISES: CPT | Performed by: PHYSICAL THERAPIST

## 2022-10-27 NOTE — THERAPY TREATMENT NOTE
"    Outpatient Physical Therapy Ortho Treatment Note  [facility] [place]     Patient Name: Naina Manning  : 1950  MRN: [id number]  Today's Date: 10/26/2022      Visit Date: 10/26/2022     Attendance: 5 visits  Subjective improvement: \"improving I think but good and bad days\"  MD appt: 12/15/22  Recheck due: 22    Visit Dx:    ICD-10-CM ICD-9-CM   1. Right hip pain  M25.551 719.45       Patient Active Problem List   Diagnosis   • Body mass index (BMI) of 25.0 to 29.9   • Essential hypertension   • Hypertension   • Hypothyroid   • Hypothyroidism   • Seasonal allergic rhinitis        Past Medical History:   Diagnosis Date   • Arthritis    • Hyperlipidemia    • Hypertension    • Hypothyroidism         Past Surgical History:   Procedure Laterality Date   • CHOLECYSTECTOMY     • [place]    [facility] [place]   • E[place]     • SALPINGO OOPHORECTOMY Bilateral 2019    Procedure: Bilateral SALPINGO OOPHORECTOMY LAPAROSCOPIC;  Surgeon: Aashish Ty MD;  Location: [facility];  Service: Obstetrics/Gynecology   • TUBAL ABDOMINAL LIGATION                       PT Ortho     Row Name 10/26/22 0800       Subjective Comments    Subjective Comments Pt states she did fine after yesterday's treatment. Just a little pain today but low.  -KG       Precautions and Contraindications    Precautions/Limitations no known precautions/limitations  -KG       Subjective Pain    Post-Treatment Pain Level 0  -KG       Posture/Observations    Posture/Observations Comments Supine L IC superior vs R. Corrects with LAD.  -KG          User Key  (r) = Recorded By, (t) = Taken By, (c) = Cosigned By    Initials Name Provider Type    Ana Avalos, PT Physical Therapist                  PT Assessment/Plan     Row Name 10/26/22 08          PT Assessment    Assessment Comments Pt did better with standing hip SLR's with cues provided for proper form. Better controlled this date. Did well with seated " "stability on stool; some mild cued for PN but good TA activation. Marching more challenging to trunk stabiltiy.  -KG        PT Plan    PT Frequency 2x/week  -KG     PT Plan Comments Continue seated & standing stability progressions. Possibly try mini squats or sit<>stands. Possible iso abd tband loop for bridges vs adduction. resume prone stability.  -KG          User Key  (r) = Recorded By, (t) = Taken By, (c) = Cosigned By    Initials Name Provider Type    Ana Avalos, PT Physical Therapist                     OP Exercises     Row Name 10/26/22 0800             Subjective Pain    Able to rate subjective pain? yes  -KG      Pre-Treatment Pain Level 1  -KG         Exercise 1    Exercise Name 1 Seated guillermina hamstring stretch wtih foot on stool  -KG      Cueing 1 Verbal  -KG      Reps 1 2  -KG      Time 1 30\" hold  -KG         Exercise 2    Exercise Name 2 seated fig 4 S/lean fwd  -KG      Cueing 2 Verbal  -KG      Reps 2 2  -KG      Time 2 30\" hold  -KG         Exercise 3    Exercise Name 3 Standing mod quad/hip flexor stretch on stool  -KG      Cueing 3 Verbal  -KG      Reps 3 2 R; 1 L  -KG      Time 3 30\" hold  -KG            User Key  (r) = Recorded By, (t) = Taken By, (c) = Cosigned By    Initials Name Provider Type    Ana Avalos, PT Physical Therapist              Exercise 4   Exercise Name 4 Standing march w/TA and posture   Cueing 4 Verbal   Sets 4 1   Reps 4 15   Exercise 5   Exercise Name 5 Standing alt hip ext SLR with posture/TA   Cueing 5 Verbal   Sets 5 2   Reps 5 10   Exercise 6   Exercise Name 6 Sidestepping with tband loop just below knees   Cueing 6 Verbal   Sets 6 1   Reps 6 4 laps   Additional Comments red   Exercise 7   Exercise Name 7 Seated PN/TA alt LAQ   Cueing 7 Verbal   Sets 7 1   Reps 7 15   Exercise 8   Exercise Name 8 Seated PN/TA alt marching   Cueing 8 Verbal   Sets 8 1   Reps 8 15   Exercise 9   Exercise Name 9 SKTC S   Reps 9 1   Time 9 30\" hold   Exercise 10 " "  Exercise Name 10 HL piriformis S   Reps 10 2   Time 10 30\" hold   Exercise 11   Exercise Name 11 Bridge iso add/TA   Cueing 11 Verbal   Sets 11 1   Reps 11 15   Exercise 12   Exercise Name 12 SL R hip abd SLR A/AA for support   Cueing 12 Verbal   Reps 12 1x10   Exercise 13   Exercise Name 13 SL clam w/TA   Cueing 13 Verbal   Sets 13 1   Reps 13 15              Manual Rx (last 36 hours)     Manual Treatments     Row Name 10/26/22 0800             Manual Rx 1    Manual Rx 1 Location pelvis/L QL  -KG      Manual Rx 1 Type L LAD  -KG         Manual Rx 2    Manual Rx 2 Location R posterolateral hip/LS and IT band  -KG      Manual Rx 2 Type MFR/STM/IASTM  -KG            User Key  (r) = Recorded By, (t) = Taken By, (c) = Cosigned By    Initials Name Provider Type    KG Ana Armstrong, PT Physical Therapist                        PT OP Goals     Row Name 10/26/22 0800          PT Short Term Goals    STG Date to Achieve 11/02/22  -KG     STG 1 Demonstrate independence/compliance in performance of initial HEP  -KG     STG 1 Progress Progressing;Partially Met  -KG     STG 2 Demontrate improved seated PN/postural positioning with seated core/LE strengthening activities with min cues required for correction  -KG     STG 2 Progress Progressing  -KG     STG 3 Demonstrate independence in performance of isometric TA contraction in various positions for functional carryover into daily activity performance  -KG     STG 3 Progress Progressing  -KG     STG 4 Demonstrate improved R hip abd MMT to 4+/5  -KG     STG 4 Progress Progressing  -KG     STG 5 Subjectively report no radiating pain distal to the hip with functional activities around the home  -KG     STG 5 Progress Progressing  -KG        Long Term Goals    LTG Date to Achieve 11/23/22  -KG     LTG 1 Subjectively report 50% overall improvement in pain symptoms and functional activity tolerance  -KG     LTG 1 Progress Progressing  -KG     LTG 2 Demonstrate improved R hip MMT " "to 5/5 in all planes  -KG     LTG 2 Progress Progressing  -KG     LTG 3 Ascend/descend training steps (4\"/6\") 1x6 with reciprocal pattern, no increased pain, good eccentric control; single handrail prn  -KG     LTG 3 Progress Progressing  -KG     LTG 4 Tolerate 15-20 minutes of standing functional stabilization activities throughout treatment session without increased pain/symptoms  -KG     LTG 4 Progress Progressing  -KG     LTG 5 Demonstrate independence/understanding in final HEP for continued management/improvement in pain/symptoms  -KG     LTG 5 Progress Progressing  -KG        Time Calculation    PT Goal Re-Cert Due Date 11/02/22  -KG           User Key  (r) = Recorded By, (t) = Taken By, (c) = Cosigned By    Initials Name Provider Type    KG Ana Armstrong, PT Physical Therapist                Therapy Education  Given: HEP, Pain management, Symptoms/condition management, Posture/body mechanics  Program: Reinforced, Progressed  How Provided: Verbal, Demonstration, Written  Provided to: Patient  Level of Understanding: Teach back education performed, Verbalized, Demonstrated              Time Calculation:   Start Time: 0850  Stop Time: 0935  Time Calculation (min): 45 min  Therapy Charges for Today     Code Description Service Date Service Provider Modifiers Qty    52338587002 HC PT THER PROC EA 15 MIN 10/26/2022 Ana Armstrong, PT GP 2    17257830946 HC PT MANUAL THERAPY EA 15 MIN 10/26/2022 Ana Armstrong, PT GP 1                    Ana Armstrong, PT  10/26/2022     "

## 2022-11-01 ENCOUNTER — HOSPITAL ENCOUNTER (OUTPATIENT)
Dept: PHYSICAL THERAPY | Facility: HOSPITAL | Age: 72
Setting detail: THERAPIES SERIES
Discharge: HOME | End: 2022-11-01

## 2022-11-01 DIAGNOSIS — M25.551 RIGHT HIP PAIN: Primary | ICD-10-CM

## 2022-11-01 PROCEDURE — 97140 MANUAL THERAPY 1/> REGIONS: CPT | Performed by: PHYSICAL THERAPIST

## 2022-11-01 PROCEDURE — 97110 THERAPEUTIC EXERCISES: CPT | Performed by: PHYSICAL THERAPIST

## 2022-11-01 NOTE — THERAPY PROGRESS REPORT/RE-CERT
"    Outpatient Physical Therapy Ortho Progress Note  [facility]/[place]     Patient Name: Naina Manning  : 1950  MRN: [id number]  Today's Date: 2022      Visit Date: 2022     Attendance: 6 visits  Subjective improvement: \"nearing 90%\"  MD appt: 12/15/22  Recheck due: 22    Visit Dx:    ICD-10-CM ICD-9-CM   1. Right hip pain  M25.551 719.45       Patient Active Problem List   Diagnosis   • Body mass index (BMI) of 25.0 to 29.9   • Essential hypertension   • Hypertension   • Hypothyroid   • Hypothyroidism   • Seasonal allergic rhinitis        Past Medical History:   Diagnosis Date   • Arthritis    • Hyperlipidemia    • Hypertension    • Hypothyroidism         Past Surgical History:   Procedure Laterality Date   • CHOLECYSTECTOMY     • COLONOSCOPY  10/04/2016    [facility] [place]   • EYE SURGERY     • SALPINGO OOPHORECTOMY Bilateral 2019    Procedure: Bilateral SALPINGO OOPHORECTOMY LAPAROSCOPIC;  Surgeon: Aashish Ty MD;  Location: [facility];  Service: Obstetrics/Gynecology   • TUBAL ABDOMINAL LIGATION          PT Ortho     Row Name 22 0800       Precautions and Contraindications    Precautions/Limitations no known precautions/limitations  -KG       Posture/Observations    Posture/Observations Comments Improving postural awareness, hilda when sitting. min to no cues for PN positioning with seated core. Tends to lock out/hyperext knees bilaterally wtih static standing. Supine L IC elevated and L ASIS superior. Corrects with MET/LAD.  -KG       Neural Tension Signs- Lower Quarter Clearing    Slump Bilateral:;Negative  -KG    SLR Bilateral:;Negative  -KG       Sensory Screen for Light Touch- Lower Quarter Clearing    L1 (inguinal area) Bilateral:;Intact  -KG    L2 (anterior mid thigh) Bilateral:;Intact  -KG    L3 (distal anterior thigh) Bilateral:;Intact  -KG    L4 (medial lower leg/foot) Bilateral:;Intact  -KG    L5 (lateral lower leg/great toe) Bilateral:;Intact  -KG       " Lumbar ROM Screen- Lower Quarter Clearing    Lumbar Flexion Normal  -KG    Lumbar Extension Normal  -KG    Lumbar Lateral Flexion Normal  -KG    Lumbar Rotation Normal  -KG       SI/Hip Screen- Lower Quarter Clearing    ASIS compression Bilateral:;Negative  -KG    ASIS distraction Bilateral:;Negative  -KG    Livan's/Geoff's test Bilateral:;Negative  -KG    Posterior thigh sheer Bilateral:;Negative  -KG       Hip/Thigh Palpation    Gluteus Medius Right:;Tender;Guarded/taut  -KG    Piriformis Right:;Tender;Guarded/taut  -KG    ITB Right:;Tender  knotty throughout  -KG       Hip Special Tests    Charlie test (tightness of ITB) Bilateral:;Negative  -KG    Piriformis test (piriformis syndrome) Right:;Positive;Left:;Negative  -KG       General ROM    GENERAL ROM COMMENTS Passive hip ROM bilateral WNL. Bilateral knee ext/flex WNL. Hip flex AROM HL: L 110 deg, R 109 deg  -KG       MMT Right Lower Ext    Rt Hip Flexion MMT, Gross Movement (4+/5) good plus  -KG    Rt Hip ABduction MMT, Gross Movement (4/5) good  -KG    Rt Hip ADduction MMT, Gross Movement (4+/5) good plus  -KG    Rt Knee Extension MMT, Gross Movement (5/5) normal  -KG    Rt Knee Flexion MMT, Gross Movement (5/5) normal  -KG    Rt Ankle Plantarflexion MMT, Gross Movement (5/5) normal  -KG    Rt Ankle Dorsiflexion MMT, Gross Movement (5/5) normal  -KG       MMT Left Lower Ext    Lt Hip Flexion MMT, Gross Movement (5/5) normal  -KG    Lt Hip ABduction MMT, Gross Movement (5/5) normal  -KG    Lt Hip ADduction MMT, Gross Movement (5/5) normal  -KG    Lt Knee Extension MMT, Gross Movement (5/5) normal  -KG    Lt Knee Flexion MMT, Gross Movement (5/5) normal  -KG    Lt Ankle Plantarflexion MMT, Gross Movement (5/5) normal  -KG    Lt Ankle Dorsiflexion MMT, Gross Movement (5/5) normal  -KG       Sensation    Sensation WNL? WFL  -KG    Light Touch No apparent deficits  -KG       Lower Extremity Flexibility    Hamstrings Bilateral:;Mildly limited  -KG    Hip Flexors  Mildly limited  -KG    Hip External Rotators Right:;Moderately limited  -KG    Hip Internal Rotators Right:;Mildly limited  -KG       Transfers    Comment, (Transfers) Improving log roll for sup<>sit with min cuing  -KG       Gait/Stairs (Locomotion)    Comment, (Gait/Stairs) Ambulates with no AD. Overall non-antalgic. Good otilia. Equal step length & stance time  -KG          User Key  (r) = Recorded By, (t) = Taken By, (c) = Cosigned By    Initials Name Provider Type    KG Ana Armstrong, PT Physical Therapist                             PT Assessment/Plan     Row Name 11/01/22 0800          PT Assessment    Functional Limitations Limitation in home management;Limitations in community activities;Performance in leisure activities;Performance in self-care ADL;Impaired gait  -KG     Impairments Impaired flexibility;Impaired muscle endurance;Muscle strength;Pain;Range of motion  -KG     Assessment Comments Pt progressing well overall with PT as evidenced by improvements in functional hip strength, dynamic stability, & functional activity tolerance. Pt does continue with weakness in glutes/hip abductors but improving. Doing very well with seated stability in regard to finding & maintaining better PN/potural positioning. Did attempt gentle fwd step ups this date but did have some mild pain at posterior hip. Pt does continue with tightness and TTP at R piriformis. Independent with stretching and notes it does help. Pt reports improved pain levels overall with functional mobility & activities outside of PT. Has not tried walking longer distances yet but not having issues with short distance walking. Pt making good gains but still needs work on flexibility, functional hip strength, dynamic core/hip stability, & functional activity tolerance & will benefit from continued skilled PT.  -KG     Rehab Potential Good  -KG     Patient/caregiver participated in establishment of treatment plan and goals Yes  -KG     Patient  "would benefit from skilled therapy intervention Yes  -KG        PT Plan    PT Frequency 2x/week  -KG     Predicted Duration of Therapy Intervention (PT) 6 more visits  -KG     PT Plan Comments Continue progressing functional hip strength/stability and core stabilization. Progress to dynadisc for seated core. Update HEP next visit. Resume prone stability as time allows. could potentially progress towards quadruped actvities or prone alt hip ext. Continue manual.  -KG           User Key  (r) = Recorded By, (t) = Taken By, (c) = Cosigned By    Initials Name Provider Type    KG Ana Armstrong, PT Physical Therapist                   OP Exercises     Row Name 11/01/22 0800             Subjective Comments    Subjective Comments Pt states she's doing quite a bit better overall. Not having as much pain. Hasn't tried walking longer distances (1 mile) yet. Still has some pain going up stairs and lifting RLE up to get into car. States she feels close to 90% better  -KG         Subjective Pain    Able to rate subjective pain? yes  -KG      Pre-Treatment Pain Level 1  -KG      Post-Treatment Pain Level 0  -KG         Exercise 1    Exercise Name 1 Seated guillermina hamstring stretch wtih foot on stool  -KG      Cueing 1 Verbal  -KG      Reps 1 2  -KG      Time 1 30\" hold  -KG         Exercise 2    Exercise Name 2 seated fig 4 S/lean fwd  -KG      Cueing 2 Verbal  -KG      Reps 2 2  -KG      Time 2 30\" hold  -KG         Exercise 3    Exercise Name 3 Standing mod quad/hip flexor stretch on stool  -KG      Cueing 3 Verbal  -KG      Reps 3 2 R; 1 L  -KG      Time 3 30\" hold  -KG         Exercise 4    Exercise Name 4 Airex standing march w/TA and posture  -KG      Cueing 4 Verbal  -KG      Sets 4 1  -KG      Reps 4 15  -KG         Exercise 5    Exercise Name 5 Standing alt hip ext SLR with posture/TA  -KG      Cueing 5 Verbal  -KG      Sets 5 2  -KG      Reps 5 10  -KG         Exercise 6    Exercise Name 6 Standing alt hip abd SLR with " "posture/TA  -KG      Cueing 6 Verbal  -KG      Sets 6 2  -KG      Reps 6 10  -KG         Exercise 7    Exercise Name 7 Mini squats with TA  -KG      Cueing 7 Verbal  -KG      Sets 7 1  -KG      Reps 7 10  -KG         Exercise 8    Exercise Name 8 Sit<>stands with iso TA from chair  -KG      Cueing 8 Verbal  -KG      Sets 8 1  -KG      Reps 8 10  -KG      Additional Comments no UE  -KG         Exercise 9    Exercise Name 9 Sidestepping with tband loop jus tbelow knees  -KG      Cueing 9 Verbal  -KG      Sets 9 1  -KG      Reps 9 3 laps  -KG      Additional Comments cues to not lock out knees; red tband loop below knees  -KG         Exercise 10    Exercise Name 10 Fwd step ups  -KG      Cueing 10 Verbal  -KG      Sets 10 1  -KG      Reps 10 15 L; 10 R  -KG      Additional Comments had some pain on R so deferred additonal reps  -KG         Exercise 11    Exercise Name 11 Seated PN/TA alt LAQ  -KG      Cueing 11 Verbal  -KG      Sets 11 2  -KG      Reps 11 10  -KG      Additional Comments elevated plinth  -KG         Exercise 12    Exercise Name 12 Seated PN/TA alt marching  -KG      Cueing 12 Verbal  -KG      Reps 12 2x10  -KG      Additional Comments elevated plinth  -KG         Exercise 13    Exercise Name 13 Seated PN/TA tband hip abd loop  -KG      Cueing 13 Verbal  -KG      Sets 13 2  -KG      Reps 13 10  -KG      Additional Comments red tband  -KG         Exercise 14    Exercise Name 14 SKTC S  -KG      Cueing 14 Verbal  -KG      Reps 14 1  -KG      Time 14 30\" hold  -KG         Exercise 15    Exercise Name 15 HL piriformis S  -KG      Cueing 15 Verbal  -KG      Reps 15 2  -KG      Time 15 30\" hold  -KG         Exercise 16    Exercise Name 16 Bridge iso abd loop/TA  -KG      Cueing 16 Verbal  -KG      Sets 16 2  -KG      Reps 16 10  -KG      Time 16 3-5\" hold  -KG            User Key  (r) = Recorded By, (t) = Taken By, (c) = Cosigned By    Initials Name Provider Type    Ana Avalos, PT Physical Therapist " "                        Manual Rx (last 36 hours)     Manual Treatments     Row Name 11/01/22 0800             Manual Rx 1    Manual Rx 1 Location R HS and L hip flexor  -KG      Manual Rx 1 Type MET performed simultaneous  -KG         Manual Rx 2    Manual Rx 2 Location Iliac crest L elevation  -KG      Manual Rx 2 Type LLE lAD  -KG         Manual Rx 3    Manual Rx 3 Location L SL: R post hip musculature/piriformis; ITband  -KG      Manual Rx 3 Type STM/MFR  -KG            User Key  (r) = Recorded By, (t) = Taken By, (c) = Cosigned By    Initials Name Provider Type    KG Ana Armstrong, PT Physical Therapist                 PT OP Goals     Row Name 11/01/22 0800          PT Short Term Goals    STG Date to Achieve --  -KG     STG 1 Demonstrate independence/compliance in performance of initial HEP  -KG     STG 1 Progress Met  -KG     STG 2 Demontrate improved seated PN/postural positioning with seated core/LE strengthening activities with min cues required for correction  -KG     STG 2 Progress Met  -KG     STG 3 Demonstrate independence in performance of isometric TA contraction in various positions for functional carryover into daily activity performance  -KG     STG 3 Progress Met  -KG     STG 4 Demonstrate improved R hip abd MMT to 4+/5  4/5  -KG     STG 4 Progress Progressing  -KG     STG 5 Subjectively report no radiating pain distal to the hip with functional activities around the home  -KG     STG 5 Progress Met  -KG        Long Term Goals    LTG Date to Achieve 11/23/22  -KG     LTG 1 Subjectively report 50% overall improvement in pain symptoms and functional activity tolerance  -KG     LTG 1 Progress Met  -KG     LTG 2 Demonstrate improved R hip MMT to 5/5 in all planes  -KG     LTG 2 Progress Progressing  -KG     LTG 3 Ascend/descend training steps (4\"/6\") 1x6 with reciprocal pattern, no increased pain, good eccentric control; single handrail prn  -KG     LTG 3 Progress Progressing  -KG     LTG 4 " Tolerate 15-20 minutes of standing functional stabilization activities throughout treatment session without increased pain/symptoms  -KG     LTG 4 Progress Progressing  -KG     LTG 5 Demonstrate independence/understanding in final HEP for continued management/improvement in pain/symptoms  -KG     LTG 5 Progress Progressing  -KG        Time Calculation    PT Goal Re-Cert Due Date 11/22/22  -KG           User Key  (r) = Recorded By, (t) = Taken By, (c) = Cosigned By    Initials Name Provider Type    KG Ana Armstrong, PT Physical Therapist                Therapy Education  Given: HEP, Pain management, Symptoms/condition management, Posture/body mechanics  Program: Reinforced  How Provided: Verbal, Demonstration, Written  Provided to: Patient  Level of Understanding: Teach back education performed, Verbalized, Demonstrated    Outcome Measure Options: Lower Extremity Functional Scale (LEFS)  Lower Extremity Functional Index  Any of your usual work, housework or school activities: A little bit of difficulty  Your usual hobbies, recreational or sporting activities: A little bit of difficulty  Getting into or out of the bath: No difficulty  Walking between rooms: No difficulty  Putting on your shoes or socks: No difficulty  Squatting: A little bit of difficulty  Lifting an object, like a bag of groceries from the floor: No difficulty  Performing light activities around your home: A little bit of difficulty  Performing heavy activities around your home: A little bit of difficulty  Getting into or out of a car: A little bit of difficulty  Walking 2 blocks: A little bit of difficulty  Walking a mile: Moderate difficulty  Going up or down 10 stairs (about 1 flight of stairs): A little bit of difficulty  Standing for 1 hour: No difficulty  Sitting for 1 hour: No difficulty  Running on even ground: Moderate difficulty  Running on uneven ground: Moderate difficulty  Making sharp turns while running fast: Moderate  difficulty  Hopping: Moderate difficulty  Rolling over in bed: A little bit of difficulty  Total: 61      Time Calculation:   Start Time: 0802  Stop Time: 0847  Time Calculation (min): 45 min  Therapy Charges for Today     Code Description Service Date Service Provider Modifiers Qty    73857154867  PT THER PROC EA 15 MIN 11/1/2022 Ana Armstrong, PT GP 2    [id number] HC PT MANUAL THERAPY EA 15 MIN 11/1/2022 Ana Armstrong, PT GP 1          PT G-Codes  Outcome Measure Options: Lower Extremity Functional Scale (LEFS)  Total: 61         Ana Armstrong PT  11/1/2022

## 2022-11-03 ENCOUNTER — HOSPITAL ENCOUNTER (OUTPATIENT)
Dept: PHYSICAL THERAPY | Facility: HOSPITAL | Age: 72
Setting detail: THERAPIES SERIES
Discharge: HOME | End: 2022-11-03

## 2022-11-03 DIAGNOSIS — M25.551 RIGHT HIP PAIN: Primary | ICD-10-CM

## 2022-11-03 PROCEDURE — 97140 MANUAL THERAPY 1/> REGIONS: CPT

## 2022-11-03 PROCEDURE — 97110 THERAPEUTIC EXERCISES: CPT

## 2022-11-03 NOTE — THERAPY TREATMENT NOTE
"    Outpatient Physical Therapy Ortho Treatment Note  [facility]     Patient Name: Naina Manning  : 1950  MRN: [id number]  Today's Date: 11/3/2022      Visit Date: 2022    Attendance: 7 visits  Subjective improvement: \"nearing 90%\"  MD appt: 12/15/22  Recheck due: 22    Visit Dx:    ICD-10-CM ICD-9-CM   1. Right hip pain  M25.551 719.45       Patient Active Problem List   Diagnosis   • Body mass index (BMI) of 25.0 to 29.9   • Essential hypertension   • Hypertension   • Hypothyroid   • Hypothyroidism   • Seasonal allergic rhinitis        Past Medical History:   Diagnosis Date   • Arthritis    • Hyperlipidemia    • Hypertension    • Hypothyroidism         Past Surgical History:   Procedure Laterality Date   • CHOLECYSTECTOMY     • COLONOSCOPY  10/04/2016    [facility] [place] - DR KING   • EYE SURGERY     • SALPINGO OOPHORECTOMY Bilateral 2019    Procedure: Bilateral SALPINGO OOPHORECTOMY LAPAROSCOPIC;  Surgeon: Aashish Ty MD;  Location: [facility];  Service: Obstetrics/Gynecology   • TUBAL ABDOMINAL LIGATION          PT Ortho     Row Name 22 0800       Subjective Comments    Subjective Comments Pt states she walked prior to PT, ~1/2 of what she did just prior to starting PT when had so much hip pn. States had some very mild pn but didn't stretch until got here for PT. Pt states that if she had pushed the distance she might have had more pain/will proceed cautiously.  -PM       Precautions and Contraindications    Precautions/Limitations no known precautions/limitations  -PM       Subjective Pain    Able to rate subjective pain? yes  -PM    Pre-Treatment Pain Level 2  -PM    Post-Treatment Pain Level 0  -PM       Posture/Observations    Posture/Observations Comments L IC and ASIS sup with LLD(short) - = after LAD'S  -PM          User Key  (r) = Recorded By, (t) = Taken By, (c) = Cosigned By    Initials Name Provider Type    PM Miriam Gusman PTA Physical Therapist " "Assistant                             PT Assessment/Plan     Row Name 11/03/22 0800          PT Assessment    Assessment Comments Per report pt able to walk ~ 1/2 of what had done prior to therapy with minimal pn but didn't stretch just came on to therapy. Pt began to feel better with stretch and exc. Post therapy no pain. Knottiness IT band. LLD was all that needed today to get = alignment  -PM        PT Plan    PT Frequency 2x/week  -PM     Predicted Duration of Therapy Intervention (PT) 6 more visits  -PM     PT Plan Comments ck on step up 4\" ability nxt visit; cont DD seated core  -PM           User Key  (r) = Recorded By, (t) = Taken By, (c) = Cosigned By    Initials Name Provider Type    PM Miriam Gusman PTA Physical Therapist Assistant                   OP Exercises     Row Name 11/03/22 0800             Subjective Comments    Subjective Comments Pt states she walked prior to PT, ~1/2 of what she did just prior to starting PT when had so much hip pn. States had some very mild pn but didn't stretch until got here for PT. Pt states that if she had pushed the distance she might have had more pain/will proceed cautiously.  -PM         Subjective Pain    Able to rate subjective pain? yes  -PM      Pre-Treatment Pain Level 2  -PM      Post-Treatment Pain Level 0  -PM         Exercise 1    Exercise Name 1 Seated guillermina hamstring stretch wtih foot on stool  -PM      Reps 1 2  -PM      Time 1 30\"  -PM         Exercise 2    Exercise Name 2 seated fig 4 S/lean fwd  -PM      Reps 2 2  -PM      Time 2 30\"  -PM         Exercise 3    Exercise Name 3 Standing mod quad/hip flexor stretch on stool  -PM      Reps 3 2  -PM      Time 3 30\"  -PM         Exercise 4    Exercise Name 4 Airex standing march w/TA and posture  -PM      Cueing 4 Verbal  -PM      Sets 4 2  -PM      Reps 4 10  -PM         Exercise 5    Exercise Name 5 Airex MS with TA  -PM      Cueing 5 Verbal;Tactile  -PM      Sets 5 2  -PM      Reps 5 10  -PM         " Exercise 6    Exercise Name 6 sit<>std w/Kegal and TA  -PM      Reps 6 15  -PM         Exercise 7    Exercise Name 7 bridge TA review + add  -PM      Reps 7 10  -PM         Exercise 8    Exercise Name 8 prone IR/heel squeeze  -PM      Sets 8 2  -PM      Reps 8 10  -PM      Additional Comments peach loop  -PM         Exercise 9    Exercise Name 9 prone over pillow guillermina hip ext w/TA  -PM      Cueing 9 Verbal  -PM      Sets 9 2  -PM      Reps 9 10  -PM         Exercise 10    Exercise Name 10 R sidelying clam review  -PM      Reps 10 10  -PM         Exercise 11    Exercise Name 11 R sidelying hip abd review  -PM      Reps 11 10  -PM         Exercise 12    Exercise Name 12 side stepping w/tband just distal to knees  -PM      Reps 12 4 laps  -PM      Additional Comments HR support  -PM            User Key  (r) = Recorded By, (t) = Taken By, (c) = Cosigned By    Initials Name Provider Type    PM Miriam Gusman PTA Physical Therapist Assistant                         Manual Rx (last 36 hours)     Manual Treatments     Row Name 11/03/22 0700             Manual Rx 1    Manual Rx 1 Location --  -PM      Manual Rx 1 Type --  -PM         Manual Rx 2    Manual Rx 2 Location Iliac crest L elevation  -PM      Manual Rx 2 Type LLE lAD and R LE  -PM         Manual Rx 3    Manual Rx 3 Location L SL: R post hip musculature/piriformis; ITband  -PM      Manual Rx 3 Type STM/MFR  -PM            User Key  (r) = Recorded By, (t) = Taken By, (c) = Cosigned By    Initials Name Provider Type    PM Miriam Gusman PTA Physical Therapist Assistant                 PT OP Goals     Row Name 11/03/22 0800          PT Short Term Goals    STG 1 Demonstrate independence/compliance in performance of initial HEP  -PM     STG 1 Progress Met  -PM     STG 2 Demontrate improved seated PN/postural positioning with seated core/LE strengthening activities with min cues required for correction  -PM     STG 2 Progress Met  -PM     STG 3 Demonstrate  "independence in performance of isometric TA contraction in various positions for functional carryover into daily activity performance  -PM     STG 3 Progress Met  -PM     STG 4 Demonstrate improved R hip abd MMT to 4+/5  4/5  -PM     STG 4 Progress Progressing  -PM     STG 5 Subjectively report no radiating pain distal to the hip with functional activities around the home  -PM     STG 5 Progress Met  -PM        Long Term Goals    LTG Date to Achieve 11/23/22  -PM     LTG 1 Subjectively report 50% overall improvement in pain symptoms and functional activity tolerance  -PM     LTG 1 Progress Met  -PM     LTG 2 Demonstrate improved R hip MMT to 5/5 in all planes  -PM     LTG 2 Progress Progressing  -PM     LTG 3 Ascend/descend training steps (4\"/6\") 1x6 with reciprocal pattern, no increased pain, good eccentric control; single handrail prn  -PM     LTG 3 Progress Progressing  -PM     LTG 4 Tolerate 15-20 minutes of standing functional stabilization activities throughout treatment session without increased pain/symptoms  -PM     LTG 4 Progress Progressing  -PM     LTG 5 Demonstrate independence/understanding in final HEP for continued management/improvement in pain/symptoms  -PM     LTG 5 Progress Progressing  -PM        Time Calculation    PT Goal Re-Cert Due Date 11/22/22  -PM           User Key  (r) = Recorded By, (t) = Taken By, (c) = Cosigned By    Initials Name Provider Type    PM Miriam Gusman PTA Physical Therapist Assistant                Therapy Education  Given: HEP, Pain management, Symptoms/condition management, Posture/body mechanics  Program: Reinforced  How Provided: Verbal, Demonstration, Written  Provided to: Patient  Level of Understanding: Teach back education performed, Verbalized, Demonstrated              Time Calculation:   Start Time: 0847  Therapy Charges for Today     Code Description Service Date Service Provider Modifiers Qty    07821003491  PT THER PROC EA 15 MIN 11/3/2022 Naseem, " Miriam Mcconnell, GERMÁN GP, CQ 2    24864442909 HC PT MANUAL THERAPY EA 15 MIN 11/3/2022 Miriam Gusman, GERMÁN GP, CQ 1                    Miriam Gusman, GERMÁN  11/3/2022

## 2022-11-07 ENCOUNTER — HOSPITAL ENCOUNTER (OUTPATIENT)
Dept: PHYSICAL THERAPY | Facility: HOSPITAL | Age: 72
Setting detail: THERAPIES SERIES
Discharge: HOME | End: 2022-11-07

## 2022-11-07 DIAGNOSIS — M25.551 RIGHT HIP PAIN: Primary | ICD-10-CM

## 2022-11-07 PROCEDURE — 97140 MANUAL THERAPY 1/> REGIONS: CPT

## 2022-11-07 PROCEDURE — 97110 THERAPEUTIC EXERCISES: CPT

## 2022-11-07 NOTE — THERAPY TREATMENT NOTE
Outpatient Physical Therapy Ortho Treatment Note  [facility]     Patient Name: Naina Manning  : 1950  MRN: [id number]  Today's Date: 2022      Visit Date: 2022    Attendance: 8 visits  Subjective improvement: 90% for sure and improving  MD appt: 12/15/22  Recheck due: 22    Visit Dx:    ICD-10-CM ICD-9-CM   1. Right hip pain  M25.551 719.45       Patient Active Problem List   Diagnosis   • Body mass index (BMI) of 25.0 to 29.9   • Essential hypertension   • Hypertension   • Hypothyroid   • Hypothyroidism   • Seasonal allergic rhinitis        Past Medical History:   Diagnosis Date   • Arthritis    • Hyperlipidemia    • Hypertension    • Hypothyroidism         Past Surgical History:   Procedure Laterality Date   • CHOLECYSTECTOMY     • COLONOSCOPY  10/04/2016    [facility] [place]   • EYE SURGERY     • SALPINGO OOPHORECTOMY Bilateral 2019    Procedure: Bilateral SALPINGO OOPHORECTOMY LAPAROSCOPIC;  Surgeon: Aashish Ty MD;  Location: [facility];  Service: Obstetrics/Gynecology   • TUBAL ABDOMINAL LIGATION          PT Ortho     Row Name 22 08       Subjective Comments    Subjective Comments Pt states she is doing much better and has been able to do the elliptical which didn't hurt.  -PM       Precautions and Contraindications    Precautions/Limitations no known precautions/limitations  -PM       Subjective Pain    Able to rate subjective pain? yes  -PM    Pre-Treatment Pain Level 0  -PM    Post-Treatment Pain Level 0  -PM       Posture/Observations    Posture/Observations Comments level pelvis; good control on stairs but mild pn  -PM          User Key  (r) = Recorded By, (t) = Taken By, (c) = Cosigned By    Initials Name Provider Type    Miriam Rousseau PTA Physical Therapist Assistant                             PT Assessment/Plan     Row Name 22 08          PT Assessment    Assessment Comments Pt doing very well; no pain on elliiptical but  "some on stairs. Pt had really good performance on DD so is ready for stability ball; she will be out of town and will be in situation where there will be stairs and more walking so patient will see more how she manages.  -PM        PT Plan    PT Frequency 2x/week  -PM     Predicted Duration of Therapy Intervention (PT) 5 more visits per POC  -PM     PT Plan Comments progress to stability ball and maybe some MTF step ups as margo  -PM           User Key  (r) = Recorded By, (t) = Taken By, (c) = Cosigned By    Initials Name Provider Type    PM Miriam Gusman, PTA Physical Therapist Assistant                   OP Exercises     Row Name 11/07/22 0800             Subjective Comments    Subjective Comments Pt states she is doing much better and has been able to do the elliptical which didn't hurt.  -PM         Subjective Pain    Able to rate subjective pain? yes  -PM      Pre-Treatment Pain Level 0  -PM      Post-Treatment Pain Level 0  -PM         Exercise 1    Exercise Name 1 Elliiptical  -PM      Cueing 1 Verbal  -PM      Time 1 5'  -PM      Additional Comments incl 4; L 2  -PM         Exercise 2    Exercise Name 2 seated B HS S  -PM      Reps 2 2  -PM      Time 2 30\"  -PM         Exercise 3    Exercise Name 3 seated fig 4 S/lean forward  -PM      Reps 3 2  -PM      Time 3 30\"  -PM         Exercise 4    Exercise Name 4 standing mod quad S  -PM      Reps 4 2  -PM      Time 4 30\"  -PM         Exercise 5    Exercise Name 5 trial of stairs up down 4\" and 6\"  -PM      Reps 5 4 laps  -PM      Additional Comments pt noted pn post R hip 2/10(\"less intense but still there some\")  -PM         Exercise 6    Exercise Name 6 sit<>stand Kegal/iso TA  -PM      Sets 6 2  -PM      Reps 6 10  -PM         Exercise 7    Exercise Name 7 DD PN TA LAQ  -PM      Cueing 7 Verbal  -PM      Sets 7 2  -PM      Reps 7 10  -PM         Exercise 8    Exercise Name 8 DD PN TA hip flex  -PM      Cueing 8 Verbal  -PM      Sets 8 2  -PM      Reps 8 10 "  -PM      Time 8 .  -PM         Exercise 9    Exercise Name 9 DD PN TA hip abd w/tband loop  -PM      Cueing 9 Verbal  -PM      Sets 9 2  -PM      Reps 9 10  -PM      Additional Comments red tband  -PM         Exercise 10    Exercise Name 10 R hip abd SLR  -PM      Sets 10 2  -PM      Reps 10 10  -PM         Exercise 11    Exercise Name 11 prone IR/heel squeeze  -PM      Cueing 11 Verbal  -PM      Sets 11 2  -PM      Reps 11 10  -PM      Additional Comments peach tband loop  -PM         Exercise 12    Exercise Name 12 prone over pillow alt hip ext with TA  -PM      Cueing 12 Verbal  -PM      Reps 12 2  -PM      Time 12 10  -PM            User Key  (r) = Recorded By, (t) = Taken By, (c) = Cosigned By    Initials Name Provider Type    PM Miriam Gusman PTA Physical Therapist Assistant                         Manual Rx (last 36 hours)     Manual Treatments     Row Name 11/07/22 0700             Manual Rx 3    Manual Rx 3 Location L SL: R post hip musculature/piriformis; ITband  -PM      Manual Rx 3 Type STM/IASTM/MFR  -PM            User Key  (r) = Recorded By, (t) = Taken By, (c) = Cosigned By    Initials Name Provider Type    PM Miriam Gusman PTA Physical Therapist Assistant                 PT OP Goals     Row Name 11/07/22 0800          PT Short Term Goals    STG 1 Demonstrate independence/compliance in performance of initial HEP  -PM     STG 1 Progress Met  -PM     STG 2 Demontrate improved seated PN/postural positioning with seated core/LE strengthening activities with min cues required for correction  -PM     STG 2 Progress Met  -PM     STG 3 Demonstrate independence in performance of isometric TA contraction in various positions for functional carryover into daily activity performance  -PM     STG 3 Progress Met  -PM     STG 4 Demonstrate improved R hip abd MMT to 4+/5  4/5  -PM     STG 4 Progress Progressing  -PM     STG 5 Subjectively report no radiating pain distal to the hip with functional  "activities around the home  -PM     STG 5 Progress Met  -PM        Long Term Goals    LTG Date to Achieve 11/23/22  -PM     LTG 1 Subjectively report 50% overall improvement in pain symptoms and functional activity tolerance  -PM     LTG 1 Progress Met  -PM     LTG 2 Demonstrate improved R hip MMT to 5/5 in all planes  -PM     LTG 2 Progress Progressing  -PM     LTG 3 Ascend/descend training steps (4\"/6\") 1x6 with reciprocal pattern, no increased pain, good eccentric control; single handrail prn  -PM     LTG 3 Progress Progressing  -PM     LTG 4 Tolerate 15-20 minutes of standing functional stabilization activities throughout treatment session without increased pain/symptoms  -PM     LTG 4 Progress Progressing  -PM     LTG 5 Demonstrate independence/understanding in final HEP for continued management/improvement in pain/symptoms  -PM     LTG 5 Progress Progressing  -PM        Time Calculation    PT Goal Re-Cert Due Date 11/22/22  -PM           User Key  (r) = Recorded By, (t) = Taken By, (c) = Cosigned By    Initials Name Provider Type    PM Miriam Gusman PTA Physical Therapist Assistant                Therapy Education  Given: HEP, Pain management, Symptoms/condition management, Posture/body mechanics  Program: Reinforced  How Provided: Verbal, Demonstration, Written  Provided to: Patient  Level of Understanding: Teach back education performed, Verbalized, Demonstrated              Time Calculation:   Start Time: 0847  Stop Time: 0929  Time Calculation (min): 42 min  Therapy Charges for Today     Code Description Service Date Service Provider Modifiers Qty    60895253204 HC PT THER PROC EA 15 MIN 11/7/2022 Miriam Gusman PTA GP, CQ 2    48545397886 HC PT MANUAL THERAPY EA 15 MIN 11/7/2022 Miriam Gusman PTA GP, CQ 1                    Miriam Gusman PTA  11/7/2022     "

## 2022-11-08 ENCOUNTER — APPOINTMENT (OUTPATIENT)
Dept: PHYSICAL THERAPY | Facility: HOSPITAL | Age: 72
End: 2022-11-08

## 2022-11-10 ENCOUNTER — APPOINTMENT (OUTPATIENT)
Dept: PHYSICAL THERAPY | Facility: HOSPITAL | Age: 72
End: 2022-11-10

## 2022-11-21 ENCOUNTER — HOSPITAL ENCOUNTER (OUTPATIENT)
Dept: PHYSICAL THERAPY | Facility: HOSPITAL | Age: 72
Setting detail: THERAPIES SERIES
Discharge: HOME | End: 2022-11-21

## 2022-11-21 ENCOUNTER — APPOINTMENT (OUTPATIENT)
Dept: PHYSICAL THERAPY | Facility: HOSPITAL | Age: 72
End: 2022-11-21

## 2022-11-21 DIAGNOSIS — M25.551 RIGHT HIP PAIN: Primary | ICD-10-CM

## 2022-11-21 PROCEDURE — 97110 THERAPEUTIC EXERCISES: CPT | Performed by: PHYSICAL THERAPIST

## 2022-11-21 NOTE — THERAPY PROGRESS REPORT/RE-CERT
Outpatient Physical Therapy Ortho Progress Note  [facility]     Patient Name: Naina Manning  : 1950  MRN: [id number]  Today's Date: 2022      Visit Date: 2022     Attendance: 9 visits  Subjective improvement: 95%  MD appt: 12/15/22  Recheck due: 2022    Visit Dx:    ICD-10-CM ICD-9-CM   1. Right hip pain  M25.551 719.45       Patient Active Problem List   Diagnosis   • Body mass index (BMI) of 25.0 to 29.9   • Essential hypertension   • Hypertension   • Hypothyroid   • Hypothyroidism   • Seasonal allergic rhinitis        Past Medical History:   Diagnosis Date   • Arthritis    • Hyperlipidemia    • Hypertension    • Hypothyroidism         Past Surgical History:   Procedure Laterality Date   • CHOLECYSTECTOMY     • [place]    [facility] [place]   • E[place]     • SALPINGO OOPHORECTOMY Bilateral 2019    Procedure: Bilateral SALPINGO OOPHORECTOMY LAPAROSCOPIC;  Surgeon: Aashish Ty MD;  Location: [facility] OR;  Service: Obstetrics/Gynecology   • TUBAL ABDOMINAL LIGATION          PT Ortho     Row Name 22 0800       Precautions and Contraindications    Precautions/Limitations no known precautions/limitations  -KG       Posture/Observations    Posture/Observations Comments Improving postural awareness both standing and seated. No malalignment noted at pelvis.  -KG       Neural Tension Signs- Lower Quarter Clearing    Slump Bilateral:;Negative  -KG    SLR Bilateral:;Negative  -KG       Sensory Screen for Light Touch- Lower Quarter Clearing    L1 (inguinal area) Bilateral:;Intact  -KG    L2 (anterior mid thigh) Bilateral:;Intact  -KG    L3 (distal anterior thigh) Bilateral:;Intact  -KG    L4 (medial lower leg/foot) Bilateral:;Intact  -KG    L5 (lateral lower leg/great toe) Bilateral:;Intact  -KG       Lumbar ROM Screen- Lower Quarter Clearing    Lumbar Flexion Normal  -KG    Lumbar Extension Normal  -KG    Lumbar Lateral Flexion Normal  -KG     Lumbar Rotation Normal  -KG       SI/Hip Screen- Lower Quarter Clearing    ASIS compression Bilateral:;Negative  -KG    ASIS distraction Bilateral:;Negative  -KG    Livan's/Geoff's test Bilateral:;Negative  -KG    Posterior thigh sheer Bilateral:;Negative  -KG       Hip/Thigh Palpation    Hip/Thigh Palpation? Yes  Denies any TTP at IT band/lateral thigh. Continues with some knottiness throughout IT band  -KG    Piriformis Right:;Tender;Guarded/taut  Mild  -KG       Hip Special Tests    Charlie test (tightness of ITB) Bilateral:;Negative  -KG    Piriformis test (piriformis syndrome) Bilateral:;Negative  -KG       General ROM    GENERAL ROM COMMENTS Passive/active hip ROM bilateral WNL. Bilateral knee ext/flex WNL.  -KG       MMT (Manual Muscle Testing)    Rt Lower Ext Rt Hip Extension  -KG    Lt Lower Ext Lt Hip Extension  -KG       MMT Right Lower Ext    Rt Hip Flexion MMT, Gross Movement (5/5) normal  -KG    Rt Hip Extension MMT, Gross Movement (4+/5) good plus  -KG    Rt Hip ABduction MMT, Gross Movement (4+/5) good plus  -KG    Rt Hip ADduction MMT, Gross Movement (5/5) normal  -KG    Rt Knee Extension MMT, Gross Movement (5/5) normal  -KG    Rt Knee Flexion MMT, Gross Movement (5/5) normal  -KG    Rt Ankle Plantarflexion MMT, Gross Movement (5/5) normal  -KG    Rt Ankle Dorsiflexion MMT, Gross Movement (5/5) normal  -KG       MMT Left Lower Ext    Lt Hip Flexion MMT, Gross Movement (5/5) normal  -KG    Lt Hip Extension MMT, Gross Movement (4+/5) good plus  -KG    Lt Hip ABduction MMT, Gross Movement (5/5) normal  -KG    Lt Hip ADduction MMT, Gross Movement (5/5) normal  -KG    Lt Knee Extension MMT, Gross Movement (5/5) normal  -KG    Lt Knee Flexion MMT, Gross Movement (5/5) normal  -KG    Lt Ankle Plantarflexion MMT, Gross Movement (5/5) normal  -KG    Lt Ankle Dorsiflexion MMT, Gross Movement (5/5) normal  -KG       Sensation    Sensation WNL? WFL  -KG    Light Touch No apparent deficits  -KG       Lower  "Extremity Flexibility    Hamstrings Bilateral:;Mildly limited  -KG    Hip Flexors Bilateral:;Mildly limited  -KG    Hip External Rotators Bilateral:;Mildly limited  -KG    Hip Internal Rotators Bilateral:;Mildly limited  -KG       Gait/Stairs (Locomotion)    Comment, (Gait/Stairs) Non-antalgic gait with no AD. Able to manage training steps (4\"/6\") reciprocally with no handrail; intermittent light touches with single UE throughout. No increased pain and good eccentric control.  -KG          User Key  (r) = Recorded By, (t) = Taken By, (c) = Cosigned By    Initials Name Provider Type    KG Ana Armstrong, PT Physical Therapist                             PT Assessment/Plan     Row Name 11/21/22 0800          PT Assessment    Functional Limitations Performance in leisure activities;Limitations in community activities  -KG     Impairments Impaired muscle endurance;Muscle strength;Posture  -KG     Assessment Comments Pt has progressed very well overall with PT. Pt has returned from a week long trip and reports doing well with porlonged walk and frequent stair management. States she had no pain with stairs and feels like she's doing much better. Demonstrates good improvements in overall hip strength and dynamic stability. Still with some mild limitations at glutes, more so with hip ext and abd but has improved quite well. Pt had no pain with any activities performed this date, to include reciprocal stairs. Did well with progression to physioball seated core, minor cues needed initially for posture/PN positioning. Pt reports 95% improvement and is compliant with HEP. Will see pt for 1 more visit to progress/finalize HEP and d/c to independent management.  -KG     Rehab Potential Good  -KG     Patient/caregiver participated in establishment of treatment plan and goals Yes  -KG     Patient would benefit from skilled therapy intervention Yes  -KG        PT Plan    Predicted Duration of Therapy Intervention (PT) One more " "visit  -KG     PT Plan Comments Plan to d/c next visit to independent management. Finalize HEP.  -KG           User Key  (r) = Recorded By, (t) = Taken By, (c) = Cosigned By    Initials Name Provider Type    KG Ana Armstrong, PT Physical Therapist                   OP Exercises     Row Name 11/21/22 0800             Subjective Comments    Subjective Comments Pt states things are going really well. Went on a week long trip where she did a lot of walking and going up/down stairs and states she did fine with them. No pain. States she knows she's going to have to keep up with her HEP.  -KG         Subjective Pain    Able to rate subjective pain? yes  -KG      Pre-Treatment Pain Level 0  -KG      Post-Treatment Pain Level 0  -KG         Exercise 1    Exercise Name 1 Elliiptical  -KG      Cueing 1 Verbal  -KG      Additional Comments incl 4; L 2  -KG         Exercise 2    Exercise Name 2 seated B HS S  -KG      Reps 2 1 ea  -KG      Time 2 30\"  -KG         Exercise 3    Exercise Name 3 seated fig 4 S/lean forward  -KG      Reps 3 1 ea  -KG      Time 3 30\"  -KG         Exercise 4    Exercise Name 4 standing mod quad S  -KG      Reps 4 1 ea  -KG      Time 4 30\" hold  -KG         Exercise 5    Exercise Name 5 Reciprocal stairs (4\"/6\")  -KG      Cueing 5 Verbal  -KG      Sets 5 1  -KG      Reps 5 5 laps  -KG      Additional Comments no railing - intermittent touches with single UE  -KG         Exercise 6    Exercise Name 6 Multifidi fwd step ups  -KG      Cueing 6 Verbal  -KG      Sets 6 1  -KG      Reps 6 15 ea LE  -KG      Additional Comments 4\" step at stairs  -KG         Exercise 7    Exercise Name 7 lateral step ups with opp hip abd SLR/TA  -KG      Cueing 7 Verbal  -KG      Sets 7 1  -KG      Reps 7 15 ea LE  -KG      Additional Comments 4\" bench step at rail  -KG         Exercise 8    Exercise Name 8 Seated physioball find PN  -KG         Exercise 9    Exercise Name 9 Seated pball PN/TA alt LAq  -KG      Cueing 9 " Verbal  -KG      Sets 9 2  -KG      Reps 9 10  -KG      Additional Comments Blue  -KG         Exercise 10    Exercise Name 10 Seated pball PN/TA alt marching  -KG      Cueing 10 Verbal  -KG      Sets 10 2  -KG      Reps 10 10  -KG      Additional Comments Blue  -KG         Exercise 11    Exercise Name 11 Seated pball PN/TA tband hip abd  -KG      Cueing 11 Verbal  -KG      Sets 11 1  -KG      Reps 11 20  -KG      Additional Comments green tband  -KG         Exercise 12    Exercise Name 12 Verbal review of mat table stretches  -KG         Exercise 13    Exercise Name 13 SL hip abd SLR guillermina  -KG      Cueing 13 Verbal  -KG      Sets 13 1  -KG      Reps 13 15 ea  -KG         Exercise 14    Exercise Name 14 Prone alt hip ext SLR withTA  -KG      Cueing 14 Verbal  -KG      Sets 14 1  -KG      Reps 14 15  -KG         Exercise 15    Exercise Name 15 prone IR/heel squeeze  -KG      Cueing 15 Verbal  -KG      Sets 15 1  -KG      Reps 15 15  -KG      Additional Comments red tband  -KG            User Key  (r) = Recorded By, (t) = Taken By, (c) = Cosigned By    Initials Name Provider Type    Ana Avalos, PT Physical Therapist                         Manual Rx (last 36 hours)     Manual Treatments     Row Name 11/21/22 0800             Manual Rx 1    Manual Rx 1 Location L SL: R post hip musculature/piriformis; ITband  -KG      Manual Rx 1 Type STM/IASTM/MFR  -KG            User Key  (r) = Recorded By, (t) = Taken By, (c) = Cosigned By    Initials Name Provider Type    Ana Avalos, PT Physical Therapist                 PT OP Goals     Row Name 11/21/22 0800          PT Short Term Goals    STG 1 Demonstrate independence/compliance in performance of initial HEP  -KG     STG 1 Progress Met  -KG     STG 2 Demontrate improved seated PN/postural positioning with seated core/LE strengthening activities with min cues required for correction  -KG     STG 2 Progress Met  -KG     STG 3 Demonstrate independence in  "performance of isometric TA contraction in various positions for functional carryover into daily activity performance  -KG     STG 3 Progress Met  -KG     STG 4 Demonstrate improved R hip abd MMT to 4+/5  -KG     STG 4 Progress Met  -KG     STG 5 Subjectively report no radiating pain distal to the hip with functional activities around the home  -KG     STG 5 Progress Met  -KG        Long Term Goals    LTG Date to Achieve 12/12/22  -KG     LTG 1 Subjectively report 50% overall improvement in pain symptoms and functional activity tolerance  -KG     LTG 1 Progress Met  -KG     LTG 2 Demonstrate improved R hip MMT to 5/5 in all planes  -KG     LTG 2 Progress Partially Met;Progressing  -KG     LTG 3 Ascend/descend training steps (4\"/6\") 1x6 with reciprocal pattern, no increased pain, good eccentric control; single handrail prn  -KG     LTG 3 Progress Met  -KG     LTG 4 Tolerate 15-20 minutes of standing functional stabilization activities throughout treatment session without increased pain/symptoms  -KG     LTG 4 Progress Met  -KG     LTG 5 Demonstrate independence/understanding in final HEP for continued management/improvement in pain/symptoms  -KG     LTG 5 Progress Progressing  -KG        Time Calculation    PT Goal Re-Cert Due Date 12/12/22  -KG           User Key  (r) = Recorded By, (t) = Taken By, (c) = Cosigned By    Initials Name Provider Type    Ana Avalos, PT Physical Therapist                Therapy Education  Given: HEP, Pain management, Symptoms/condition management, Posture/body mechanics  Program: Reinforced  How Provided: Verbal, Demonstration, Written  Provided to: Patient  Level of Understanding: Teach back education performed, Verbalized, Demonstrated    Outcome Measure Options: Lower Extremity Functional Scale (LEFS)  Lower Extremity Functional Index  Any of your usual work, housework or school activities: No difficulty  Your usual hobbies, recreational or sporting activities: No " difficulty  Getting into or out of the bath: No difficulty  Walking between rooms: No difficulty  Putting on your shoes or socks: No difficulty  Squatting: No difficulty  Lifting an object, like a bag of groceries from the floor: No difficulty  Performing light activities around your home: No difficulty  Performing heavy activities around your home: No difficulty  Getting into or out of a car: No difficulty  Walking 2 blocks: No difficulty  Walking a mile: No difficulty  Going up or down 10 stairs (about 1 flight of stairs): No difficulty  Standing for 1 hour: No difficulty  Sitting for 1 hour: No difficulty  Running on even ground: A little bit of difficulty  Running on uneven ground: A little bit of difficulty  Making sharp turns while running fast: A little bit of difficulty  Hopping: A little bit of difficulty  Rolling over in bed: No difficulty  Total: 76      Time Calculation:   Start Time: 0802  Stop Time: 0844  Time Calculation (min): 42 min  Therapy Charges for Today     Code Description Service Date Service Provider Modifiers Qty    42739658480 HC PT THER PROC EA 15 MIN 11/21/2022 Ana Armstrong, PT GP 3          PT G-Codes  Outcome Measure Options: Lower Extremity Functional Scale (LEFS)  Total: 76         Ana Armstrong PT  11/21/2022

## 2022-12-22 ENCOUNTER — TELEPHONE (OUTPATIENT)
Dept: FAMILY MEDICINE CLINIC | Facility: CLINIC | Age: 72
End: 2022-12-22

## 2022-12-22 RX ORDER — DEXTROMETHORPHAN HYDROBROMIDE AND PROMETHAZINE HYDROCHLORIDE 15; 6.25 MG/5ML; MG/5ML
5 SYRUP ORAL 4 TIMES DAILY PRN
Qty: 240 ML | Refills: 2 | Status: SHIPPED | OUTPATIENT
Start: 2022-12-22 | End: 2023-01-09

## 2022-12-22 RX ORDER — ALBUTEROL SULFATE 90 UG/1
2 AEROSOL, METERED RESPIRATORY (INHALATION) EVERY 4 HOURS PRN
Qty: 6.7 G | Refills: 2 | Status: SHIPPED | OUTPATIENT
Start: 2022-12-22

## 2022-12-22 NOTE — TELEPHONE ENCOUNTER
Caller: Naina Manning    Relationship: Self    Best call back number: [phone]    What medication are you requesting: INHALER AND COUGH MEDICINE    What are your current symptoms: COUGHING, LITTLE BIT OF CONGESTION    How long have you been experiencing symptoms: A WEEK     Have you had these symptoms before:    [] Yes  [x] No    Have you been treated for these symptoms before:   [] Yes  [x] No    If a prescription is needed, what is your preferred pharmacy and phone number:   [place], [place]   [phone]     Additional notes:

## 2023-01-09 ENCOUNTER — OFFICE VISIT (OUTPATIENT)
Dept: FAMILY MEDICINE CLINIC | Facility: CLINIC | Age: 73
End: 2023-01-09
Payer: MEDICARE

## 2023-01-09 VITALS
TEMPERATURE: 97.8 F | SYSTOLIC BLOOD PRESSURE: 130 MMHG | HEIGHT: 65 IN | BODY MASS INDEX: 27.09 KG/M2 | HEART RATE: 98 BPM | DIASTOLIC BLOOD PRESSURE: 78 MMHG | WEIGHT: 162.6 LBS | RESPIRATION RATE: 16 BRPM | OXYGEN SATURATION: 97 %

## 2023-01-09 DIAGNOSIS — E78.2 MIXED HYPERLIPIDEMIA: Primary | ICD-10-CM

## 2023-01-09 PROCEDURE — 99213 OFFICE O/P EST LOW 20 MIN: CPT | Performed by: FAMILY MEDICINE

## 2023-01-09 NOTE — PROGRESS NOTES
Subjective   Naina Manning is a 72 y.o. female.     History of Present Illness  72-year-old female history of hypertension hyperlipidemia      The following portions of the patient's history were reviewed and updated as appropriate: allergies, current medications, past family history, past medical history, past social history, past surgical history and problem list.    Review of Systems   Respiratory: Negative for shortness of breath.    Cardiovascular: Negative for chest pain and leg swelling.   Musculoskeletal: Positive for arthralgias.       Objective   Physical Exam  Vitals and nursing note reviewed.   Constitutional:       Appearance: Normal appearance.   Eyes:      Extraocular Movements: Extraocular movements intact.      Pupils: Pupils are equal, round, and reactive to light.   Cardiovascular:      Rate and Rhythm: Normal rate and regular rhythm.   Pulmonary:      Effort: Pulmonary effort is normal.      Breath sounds: Normal breath sounds.   Abdominal:      General: Abdomen is flat.      Palpations: Abdomen is soft.   Musculoskeletal:      Right lower leg: No edema.      Left lower leg: No edema.   Skin:     General: Skin is warm and dry.   Neurological:      General: No focal deficit present.      Mental Status: She is alert and oriented to person, place, and time.   Psychiatric:         Mood and Affect: Mood normal.         Behavior: Behavior normal.         Thought Content: Thought content normal.         Judgment: Judgment normal.         Assessment & Plan   Diagnoses and all orders for this visit:    1. Mixed hyperlipidemia (Primary)  -     Comprehensive Metabolic Panel  -     Lipid Panel       Plan above-continue activity Mediterranean diet         Answers for HPI/ROS submitted by the patient on 1/2/2023  Please describe your symptoms.: None 6 month check  Have you had these symptoms before?: Yes  How long have you been having these symptoms?: 1-4 days  Please list any medications you are currently  taking for this condition.: None  Please describe any probable cause for these symptoms. : None  What is the primary reason for your visit?: Other

## 2023-01-10 LAB
ALBUMIN SERPL-MCNC: 4.4 G/DL (ref 3.5–5.2)
ALBUMIN/GLOB SERPL: 1.3 G/DL
ALP SERPL-CCNC: 78 U/L (ref 39–117)
ALT SERPL-CCNC: 13 U/L (ref 1–33)
AST SERPL-CCNC: 14 U/L (ref 1–32)
BILIRUB SERPL-MCNC: 0.4 MG/DL (ref 0–1.2)
BUN SERPL-MCNC: 15 MG/DL (ref 8–23)
BUN/CREAT SERPL: 20.5 (ref 7–25)
CALCIUM SERPL-MCNC: 10.1 MG/DL (ref 8.6–10.5)
CHLORIDE SERPL-SCNC: 94 MMOL/L (ref 98–107)
CHOLEST SERPL-MCNC: 150 MG/DL (ref 0–200)
CO2 SERPL-SCNC: 28.6 MMOL/L (ref 22–29)
CREAT SERPL-MCNC: 0.73 MG/DL (ref 0.57–1)
EGFRCR SERPLBLD CKD-EPI 2021: 88.6 ML/MIN/1.73
GLOBULIN SER CALC-MCNC: 3.4 GM/DL
GLUCOSE SERPL-MCNC: 95 MG/DL (ref 65–99)
HDLC SERPL-MCNC: 48 MG/DL (ref 40–60)
LDLC SERPL CALC-MCNC: 81 MG/DL (ref 0–100)
POTASSIUM SERPL-SCNC: 4.2 MMOL/L (ref 3.5–5.2)
PROT SERPL-MCNC: 7.8 G/DL (ref 6–8.5)
SODIUM SERPL-SCNC: 135 MMOL/L (ref 136–145)
TRIGL SERPL-MCNC: 115 MG/DL (ref 0–150)
VLDLC SERPL CALC-MCNC: 21 MG/DL (ref 5–40)

## 2023-02-10 ENCOUNTER — PRIOR AUTHORIZATION (OUTPATIENT)
Dept: FAMILY MEDICINE CLINIC | Facility: CLINIC | Age: 73
End: 2023-02-10
Payer: MEDICARE

## 2023-02-10 NOTE — TELEPHONE ENCOUNTER
Prior auth submitted through cover my meds for hydroxyzine.    Approvedtoday  CaseId:[id number];Status:Approved;Review Type:Prior Auth;Coverage Start Date:01/11/2023;Coverage End Date:02/10/2024;

## 2023-04-07 DIAGNOSIS — F41.9 ANXIETY: ICD-10-CM

## 2023-04-07 DIAGNOSIS — R73.03 PREDIABETES: ICD-10-CM

## 2023-04-07 RX ORDER — HYDROXYZINE HYDROCHLORIDE 25 MG/1
TABLET, FILM COATED ORAL
Qty: 120 TABLET | Refills: 35 | Status: SHIPPED | OUTPATIENT
Start: 2023-04-07

## 2023-04-07 RX ORDER — OMEPRAZOLE 40 MG/1
CAPSULE, DELAYED RELEASE ORAL
Qty: 90 CAPSULE | Refills: 3 | Status: SHIPPED | OUTPATIENT
Start: 2023-04-07

## 2023-04-07 NOTE — TELEPHONE ENCOUNTER
Rx Refill Note  Requested Prescriptions     Pending Prescriptions Disp Refills   • omeprazole (priLOSEC) 40 MG capsule [Pharmacy Med Name: OMEPRAZOLE DR CAPS 40MG] 90 capsule 3     Sig: TAKE 1 CAPSULE DAILY      Last office visit with prescribing clinician: 1/9/23  Last telemedicine visit with prescribing clinician: 4/7/2023   Next office visit with prescribing clinician: Visit date not found     Kaycee Mora MA  04/07/23, 07:29 CDT

## 2023-06-12 RX ORDER — FLUTICASONE PROPIONATE 50 MCG
SPRAY, SUSPENSION (ML) NASAL
Qty: 16 G | Refills: 11 | Status: SHIPPED | OUTPATIENT
Start: 2023-06-12

## 2023-06-12 NOTE — TELEPHONE ENCOUNTER
Rx Refill Note  Requested Prescriptions     Pending Prescriptions Disp Refills    fluticasone (FLONASE) 50 MCG/ACT nasal spray [Pharmacy Med Name: FLUTICASONE PROP NASAL SPRAY 16GM 50MCG] 16 g 11     Sig: USE 2 SPRAYS IN EACH NOSTRIL DAILY      Last office visit with prescribing clinician: 1/9/23  Last telemedicine visit with prescribing clinician: Visit date not found   Next office visit with prescribing clinician: 6/21/23  Kaycee Mora MA  06/12/23, 07:38 CDT

## 2023-07-10 NOTE — TELEPHONE ENCOUNTER
Ok to refer. For hypertension and increased cardiovascular risk. 
Pt currently sees Dr. Carranza and is requesting a referral to Dr. Mcginnis[facility]. Would like appt same day as spouse and wants morning appt.   
Referral pended.  
No

## 2023-08-12 DIAGNOSIS — I10 ESSENTIAL HYPERTENSION: Primary | ICD-10-CM

## 2023-08-12 DIAGNOSIS — E03.9 HYPOTHYROIDISM, UNSPECIFIED TYPE: ICD-10-CM

## 2023-08-14 RX ORDER — CLONIDINE HYDROCHLORIDE 0.2 MG/1
TABLET ORAL
Qty: 90 TABLET | Refills: 3 | Status: SHIPPED | OUTPATIENT
Start: 2023-08-14

## 2023-08-14 RX ORDER — LEVOTHYROXINE SODIUM 0.05 MG/1
TABLET ORAL
Qty: 90 TABLET | Refills: 3 | Status: SHIPPED | OUTPATIENT
Start: 2023-08-14

## 2023-08-14 RX ORDER — AMLODIPINE BESYLATE 10 MG/1
TABLET ORAL
Qty: 90 TABLET | Refills: 3 | Status: SHIPPED | OUTPATIENT
Start: 2023-08-14

## 2023-08-14 RX ORDER — INDAPAMIDE 2.5 MG/1
TABLET, FILM COATED ORAL
Qty: 90 TABLET | Refills: 3 | Status: SHIPPED | OUTPATIENT
Start: 2023-08-14

## 2023-08-14 RX ORDER — LOSARTAN POTASSIUM 100 MG/1
TABLET ORAL
Qty: 90 TABLET | Refills: 3 | Status: SHIPPED | OUTPATIENT
Start: 2023-08-14

## 2023-08-14 RX ORDER — CLONIDINE HYDROCHLORIDE 0.1 MG/1
TABLET ORAL
Qty: 90 TABLET | Refills: 3 | Status: SHIPPED | OUTPATIENT
Start: 2023-08-14

## 2023-08-14 NOTE — TELEPHONE ENCOUNTER
Rx Refill Note  Requested Prescriptions     Pending Prescriptions Disp Refills    levothyroxine (SYNTHROID, LEVOTHROID) 50 MCG tablet [Pharmacy Med Name: L-THYROXINE (SYNTHROID) TABS 50MCG] 90 tablet 3     Sig: TAKE 1 TABLET EVERY MORNING    losartan (COZAAR) 100 MG tablet [Pharmacy Med Name: LOSARTAN TABS 100MG] 90 tablet 3     Sig: TAKE 1 TABLET EVERY MORNING    amLODIPine (NORVASC) 10 MG tablet [Pharmacy Med Name: AMLODIPINE BESYLATE TABS 10MG] 90 tablet 3     Sig: TAKE 1 TABLET DAILY    indapamide (LOZOL) 2.5 MG tablet [Pharmacy Med Name: INDAPAMIDE TABS 2.5MG] 90 tablet 3     Sig: TAKE 1 TABLET DAILY    cloNIDine (CATAPRES) 0.1 MG tablet [Pharmacy Med Name: CLONIDINE HCL TABS 0.1MG] 90 tablet 3     Sig: TAKE 1 TABLET EVERY NIGHT    cloNIDine (CATAPRES) 0.2 MG tablet [Pharmacy Med Name: CLONIDINE HCL TABS 0.2MG] 90 tablet 3     Sig: TAKE 1 TABLET EVERY MORNING      Last office visit with prescribing clinician: 6/21/23   Last telemedicine visit with prescribing clinician: Visit date not found   Next office visit with prescribing clinician: 12/21/23    {TIP  Please add Last Relevant Lab 6/21/23    Kaycee Mora MA  08/14/23, 07:44 CDT

## 2023-08-15 NOTE — TELEPHONE ENCOUNTER
Rx Refill Note  Requested Prescriptions     Pending Prescriptions Disp Refills   • metFORMIN (GLUCOPHAGE) 500 MG tablet [Pharmacy Med Name: METFORMIN HCL TABS 500MG] 180 tablet 3     Sig: TAKE 1 TABLET TWICE A DAY WITH MEALS   • hydrOXYzine (ATARAX) 25 MG tablet [Pharmacy Med Name: HYDROXYZINE HCL TABS 25MG] 120 tablet 35     Sig: TAKE 1 TO 2 TABLETS EVERY 4 TO 6 HOURS AS NEEDED FOR ANXIETY      Last office visit with prescribing clinician: 1/9/2023   Last telemedicine visit with prescribing clinician: 6/21/2023   Next office visit with prescribing clinician: 6/21/2023       {TIP  Please add Last Relevant Lab 1/9/23         Kaycee Mora MA  04/07/23, 07:31 CDT     Render In Strict Bullet Format?: No Detail Level: Zone Plan: Discontinue doxycycline 100 mg. Continue using Tretinoin on face nightly. Paper script given today. Patient purchased from office.

## 2023-10-31 ENCOUNTER — FLU SHOT (OUTPATIENT)
Dept: FAMILY MEDICINE CLINIC | Facility: CLINIC | Age: 73
End: 2023-10-31
Payer: MEDICARE

## 2023-10-31 DIAGNOSIS — Z23 NEED FOR INFLUENZA VACCINATION: Primary | ICD-10-CM

## 2023-11-06 ENCOUNTER — TELEPHONE (OUTPATIENT)
Dept: FAMILY MEDICINE CLINIC | Facility: CLINIC | Age: 73
End: 2023-11-06
Payer: MEDICARE

## 2023-11-07 ENCOUNTER — CLINICAL SUPPORT (OUTPATIENT)
Dept: FAMILY MEDICINE CLINIC | Facility: CLINIC | Age: 73
End: 2023-11-07
Payer: MEDICARE

## 2023-11-07 DIAGNOSIS — Z23 NEED FOR PNEUMOCOCCAL 20-VALENT CONJUGATE VACCINATION: Primary | ICD-10-CM

## 2023-11-28 NOTE — ADDENDUM NOTE
Addended by: CARLYLE MART on: 4/3/2017 10:40 AM     Modules accepted: Orders     Report called to [facility] pt to floor @1450. Patient VS per unit protocol hold blood thinners per unit protocol. Idris COLLADO of information given.

## 2023-12-21 ENCOUNTER — OFFICE VISIT (OUTPATIENT)
Dept: FAMILY MEDICINE CLINIC | Facility: CLINIC | Age: 73
End: 2023-12-21
Payer: MEDICARE

## 2023-12-21 VITALS
DIASTOLIC BLOOD PRESSURE: 80 MMHG | HEART RATE: 80 BPM | WEIGHT: 166.2 LBS | HEIGHT: 65 IN | OXYGEN SATURATION: 98 % | RESPIRATION RATE: 18 BRPM | TEMPERATURE: 97.5 F | SYSTOLIC BLOOD PRESSURE: 130 MMHG | BODY MASS INDEX: 27.69 KG/M2

## 2023-12-21 DIAGNOSIS — R73.03 PREDIABETES: ICD-10-CM

## 2023-12-21 DIAGNOSIS — E78.2 MIXED HYPERLIPIDEMIA: Primary | ICD-10-CM

## 2023-12-21 DIAGNOSIS — R53.83 FATIGUE, UNSPECIFIED TYPE: ICD-10-CM

## 2023-12-21 NOTE — PROGRESS NOTES
Subjective   Naina Manning is a 73 y.o. female.     History of Present Illness  73-year-old female follow-up for hyperlipidemia and hypertension      The following portions of the patient's history were reviewed and updated as appropriate: allergies, current medications, past family history, past medical history, past social history, past surgical history, and problem list.    Review of Systems   Respiratory:  Negative for shortness of breath.    Cardiovascular:  Negative for chest pain and leg swelling.   Musculoskeletal:  Positive for arthralgias.        Left knee inspection       Objective   Physical Exam  Vitals and nursing note reviewed.   Constitutional:       Appearance: Normal appearance.   Eyes:      Extraocular Movements: Extraocular movements intact.      Pupils: Pupils are equal, round, and reactive to light.   Cardiovascular:      Rate and Rhythm: Normal rate and regular rhythm.      Pulses: Normal pulses.      Heart sounds: Normal heart sounds.   Pulmonary:      Effort: Pulmonary effort is normal.      Breath sounds: Normal breath sounds.   Abdominal:      Palpations: Abdomen is soft. There is no mass.   Musculoskeletal:      Right lower leg: No edema.      Left lower leg: No edema.   Skin:     General: Skin is warm and dry.   Neurological:      General: No focal deficit present.      Mental Status: She is alert and oriented to person, place, and time.   Psychiatric:         Mood and Affect: Mood normal.         Behavior: Behavior normal.         Thought Content: Thought content normal.         Judgment: Judgment normal.         Assessment & Plan   Diagnoses and all orders for this visit:    1. Mixed hyperlipidemia (Primary)  -     Comprehensive Metabolic Panel  -     Lipid Panel    2. Fatigue, unspecified type  -     CBC & Differential    3. Prediabetes  -     Hemoglobin A1c       Plan above-knee exercises for early arthritis left knee           Answers submitted by the patient for this visit:  Other  (Submitted on 12/14/2023)  Please describe your symptoms.: 6 month check up  Have you had these symptoms before?: No  How long have you been having these symptoms?: 1-4 days  Please list any medications you are currently taking for this condition.: None  Please describe any probable cause for these symptoms. : None  Primary Reason for Visit (Submitted on 12/14/2023)  What is the primary reason for your visit?: Other

## 2023-12-22 DIAGNOSIS — R53.83 FATIGUE, UNSPECIFIED TYPE: ICD-10-CM

## 2023-12-22 DIAGNOSIS — E78.2 MIXED HYPERLIPIDEMIA: Primary | ICD-10-CM

## 2023-12-22 DIAGNOSIS — I10 ESSENTIAL HYPERTENSION: ICD-10-CM

## 2023-12-22 LAB
ALBUMIN SERPL-MCNC: 4.7 G/DL (ref 3.5–5.2)
ALBUMIN/GLOB SERPL: 1.7 G/DL
ALP SERPL-CCNC: 74 U/L (ref 39–117)
ALT SERPL-CCNC: 14 U/L (ref 1–33)
AST SERPL-CCNC: 14 U/L (ref 1–32)
BASOPHILS # BLD AUTO: 0.07 10*3/MM3 (ref 0–0.2)
BASOPHILS NFR BLD AUTO: 1 % (ref 0–1.5)
BILIRUB SERPL-MCNC: 0.4 MG/DL (ref 0–1.2)
BUN SERPL-MCNC: 11 MG/DL (ref 8–23)
BUN/CREAT SERPL: 15.3 (ref 7–25)
CALCIUM SERPL-MCNC: 9.3 MG/DL (ref 8.6–10.5)
CHLORIDE SERPL-SCNC: 97 MMOL/L (ref 98–107)
CHOLEST SERPL-MCNC: 149 MG/DL (ref 0–200)
CO2 SERPL-SCNC: 29.4 MMOL/L (ref 22–29)
CREAT SERPL-MCNC: 0.72 MG/DL (ref 0.57–1)
EGFRCR SERPLBLD CKD-EPI 2021: 89.5 ML/MIN/1.73
EOSINOPHIL # BLD AUTO: 0.13 10*3/MM3 (ref 0–0.4)
EOSINOPHIL NFR BLD AUTO: 1.8 % (ref 0.3–6.2)
ERYTHROCYTE [DISTWIDTH] IN BLOOD BY AUTOMATED COUNT: 12.5 % (ref 12.3–15.4)
GLOBULIN SER CALC-MCNC: 2.7 GM/DL
GLUCOSE SERPL-MCNC: 95 MG/DL (ref 65–99)
HBA1C MFR BLD: 5.9 % (ref 4.8–5.6)
HCT VFR BLD AUTO: 40.6 % (ref 34–46.6)
HDLC SERPL-MCNC: 46 MG/DL (ref 40–60)
HGB BLD-MCNC: 12.8 G/DL (ref 12–15.9)
IMM GRANULOCYTES # BLD AUTO: 0.02 10*3/MM3 (ref 0–0.05)
IMM GRANULOCYTES NFR BLD AUTO: 0.3 % (ref 0–0.5)
LDLC SERPL CALC-MCNC: 80 MG/DL (ref 0–100)
LYMPHOCYTES # BLD AUTO: 2.05 10*3/MM3 (ref 0.7–3.1)
LYMPHOCYTES NFR BLD AUTO: 27.9 % (ref 19.6–45.3)
MCH RBC QN AUTO: 27.8 PG (ref 26.6–33)
MCHC RBC AUTO-ENTMCNC: 31.5 G/DL (ref 31.5–35.7)
MCV RBC AUTO: 88.1 FL (ref 79–97)
MONOCYTES # BLD AUTO: 0.5 10*3/MM3 (ref 0.1–0.9)
MONOCYTES NFR BLD AUTO: 6.8 % (ref 5–12)
NEUTROPHILS # BLD AUTO: 4.58 10*3/MM3 (ref 1.7–7)
NEUTROPHILS NFR BLD AUTO: 62.2 % (ref 42.7–76)
NRBC BLD AUTO-RTO: 0 /100 WBC (ref 0–0.2)
PLATELET # BLD AUTO: 451 10*3/MM3 (ref 140–450)
POTASSIUM SERPL-SCNC: 4.3 MMOL/L (ref 3.5–5.2)
PROT SERPL-MCNC: 7.4 G/DL (ref 6–8.5)
RBC # BLD AUTO: 4.61 10*6/MM3 (ref 3.77–5.28)
SODIUM SERPL-SCNC: 138 MMOL/L (ref 136–145)
TRIGL SERPL-MCNC: 128 MG/DL (ref 0–150)
VLDLC SERPL CALC-MCNC: 23 MG/DL (ref 5–40)
WBC # BLD AUTO: 7.35 10*3/MM3 (ref 3.4–10.8)

## 2023-12-22 RX ORDER — ROSUVASTATIN CALCIUM 20 MG/1
20 TABLET, COATED ORAL NIGHTLY
COMMUNITY

## 2023-12-29 ENCOUNTER — TELEPHONE (OUTPATIENT)
Dept: FAMILY MEDICINE CLINIC | Facility: CLINIC | Age: 73
End: 2023-12-29

## 2023-12-29 NOTE — TELEPHONE ENCOUNTER
Home Covid test was negative this morning but symptoms had just started.  She did mention she was around her son and he was sick 2 days ago and he went to the dr and was negative for covid, flu and strep.  Can she have something called out since no openings?

## 2023-12-29 NOTE — TELEPHONE ENCOUNTER
Caller: Naina Manning    Relationship: Self    Best call back number: [phone]     What medication are you requesting: SOMETHING FOR SORE THROAT, FEVER, NOT ABLE TO EAT, WEAK AND TIRED    What are your current symptoms: SORE THROAT, FEVER, NOT ABLE TO EAT, WEAK AND TIRED    How long have you been experiencing symptoms: THIS MORNING    Have you had these symptoms before:    [] Yes  [x] No    Have you been treated for these symptoms before:   [] Yes  [x] No    If a prescription is needed, what is your preferred pharmacy and phone number: [address].[facility] [address] - [phone] [facility] [phone] FX     Additional notes: PLEASE CALL WHEN THIS HAS BEEN SENT TO THE PHARMACY.

## 2023-12-29 NOTE — TELEPHONE ENCOUNTER
If symptoms just started this morning she doesn't need antibiotic. Take tylenol for sore throat and fever.

## 2024-04-09 DIAGNOSIS — R73.03 PREDIABETES: ICD-10-CM

## 2024-04-09 RX ORDER — OMEPRAZOLE 40 MG/1
CAPSULE, DELAYED RELEASE ORAL
Qty: 90 CAPSULE | Refills: 3 | Status: SHIPPED | OUTPATIENT
Start: 2024-04-09

## 2024-04-09 NOTE — TELEPHONE ENCOUNTER
Rx Refill Note  Requested Prescriptions     Pending Prescriptions Disp Refills    omeprazole (priLOSEC) 40 MG capsule [Pharmacy Med Name: OMEPRAZOLE DR CAPS 40MG] 90 capsule 3     Sig: TAKE 1 CAPSULE DAILY      Last office visit with prescribing clinician: 12/21/2023  Last telemedicine visit with prescribing clinician: Visit date not found   Next office visit with prescribing clinician: 7/18/2024    Lea Zabala MA  04/09/24, 14:09 CDT

## 2024-04-09 NOTE — TELEPHONE ENCOUNTER
Rx Refill Note  Requested Prescriptions     Pending Prescriptions Disp Refills    metFORMIN (GLUCOPHAGE) 500 MG tablet [Pharmacy Med Name: METFORMIN HCL TABS 500MG] 180 tablet 3     Sig: TAKE 1 TABLET TWICE A DAY WITH MEALS      Last office visit with prescribing clinician: 12/21/2023   Last telemedicine visit with prescribing clinician: Visit date not found   Next office visit with prescribing clinician: 7/18/2024       Lea Zabala MA  04/09/24, 14:10 CDT

## 2024-04-11 DIAGNOSIS — R73.03 PREDIABETES: ICD-10-CM

## 2024-04-11 DIAGNOSIS — E78.2 MIXED HYPERLIPIDEMIA: Primary | ICD-10-CM

## 2024-04-11 RX ORDER — ROSUVASTATIN CALCIUM 20 MG/1
20 TABLET, COATED ORAL NIGHTLY
Qty: 90 TABLET | Refills: 1 | Status: SHIPPED | OUTPATIENT
Start: 2024-04-11

## 2024-04-11 NOTE — TELEPHONE ENCOUNTER
Caller: Naina Manning    Relationship: Self    Best call back number:     [phone]       Requested Prescriptions:   Requested Prescriptions     Pending Prescriptions Disp Refills    metFORMIN (GLUCOPHAGE) 500 MG tablet 180 tablet 3     Sig: Take 1 tablet by mouth 2 (Two) Times a Day With Meals.    rosuvastatin (CRESTOR) 20 MG tablet 90 tablet      Sig: Take 1 tablet by mouth Every Night.        Pharmacy where request should be sent: [facility] [address] [phone] [facility] [phone]      Last office visit with prescribing clinician: 12/21/2023   Last telemedicine visit with prescribing clinician: Visit date not found   Next office visit with prescribing clinician: 7/18/2024     Additional details provided by patient     Does the patient have less than a 3 day supply:  [] Yes  [x] No    Would you like a call back once the refill request has been completed: [] Yes [x] No    If the office needs to give you a call back, can they leave a voicemail: [] Yes [x] No    Farzad Amanda Rep   04/11/24 08:28 CDT

## 2024-05-14 DIAGNOSIS — F41.9 ANXIETY: ICD-10-CM

## 2024-05-14 RX ORDER — HYDROXYZINE HYDROCHLORIDE 25 MG/1
TABLET, FILM COATED ORAL
Qty: 120 TABLET | Refills: 0 | Status: SHIPPED | OUTPATIENT
Start: 2024-05-14

## 2024-05-14 NOTE — TELEPHONE ENCOUNTER
Rx Refill Note  Requested Prescriptions     Pending Prescriptions Disp Refills    hydrOXYzine (ATARAX) 25 MG tablet [Pharmacy Med Name: HYDROXYZINE HCL TABS 25MG] 120 tablet 35     Sig: TAKE 1 TO 2 TABLETS EVERY 4 TO 6 HOURS AS NEEDED FOR ANXIETY      Last office visit with prescribing clinician: 12/21/2023   Last telemedicine visit with prescribing clinician: Visit date not found   Next office visit with prescribing clinician: 7/18/2024                         Would you like a call back once the refill request has been completed: [] Yes [] No    If the office needs to give you a call back, can they leave a voicemail: [] Yes [] No    Kaycee Branch MA  05/14/24, 14:17 CDT

## 2024-06-24 RX ORDER — FLUTICASONE PROPIONATE 50 MCG
SPRAY, SUSPENSION (ML) NASAL
Qty: 16 G | Refills: 11 | Status: SHIPPED | OUTPATIENT
Start: 2024-06-24

## 2024-06-24 NOTE — TELEPHONE ENCOUNTER
Rx Refill Note  Requested Prescriptions     Pending Prescriptions Disp Refills    fluticasone (FLONASE) 50 MCG/ACT nasal spray [Pharmacy Med Name: FLUTICASONE PROP NASAL SPRAY 16GM 50MCG] 16 g 11     Sig: USE 2 SPRAYS IN EACH NOSTRIL DAILY      Last office visit with prescribing clinician: 12/21/2023   Last telemedicine visit with prescribing clinician: Visit date not found   Next office visit with prescribing clinician: 7/18/2024     Kaycee Mora MA  06/24/24, 07:47 CDT

## 2024-06-27 ENCOUNTER — OFFICE VISIT (OUTPATIENT)
Dept: FAMILY MEDICINE CLINIC | Facility: CLINIC | Age: 74
End: 2024-06-27
Payer: MEDICARE

## 2024-06-27 VITALS
DIASTOLIC BLOOD PRESSURE: 80 MMHG | HEIGHT: 65 IN | SYSTOLIC BLOOD PRESSURE: 136 MMHG | OXYGEN SATURATION: 98 % | TEMPERATURE: 97.1 F | WEIGHT: 168 LBS | RESPIRATION RATE: 18 BRPM | BODY MASS INDEX: 27.99 KG/M2 | HEART RATE: 92 BPM

## 2024-06-27 DIAGNOSIS — B37.0 ORAL THRUSH: Primary | ICD-10-CM

## 2024-06-27 PROCEDURE — 3075F SYST BP GE 130 - 139MM HG: CPT | Performed by: FAMILY MEDICINE

## 2024-06-27 PROCEDURE — 1159F MED LIST DOCD IN RCRD: CPT | Performed by: FAMILY MEDICINE

## 2024-06-27 PROCEDURE — 1125F AMNT PAIN NOTED PAIN PRSNT: CPT | Performed by: FAMILY MEDICINE

## 2024-06-27 PROCEDURE — 99213 OFFICE O/P EST LOW 20 MIN: CPT | Performed by: FAMILY MEDICINE

## 2024-06-27 PROCEDURE — 3079F DIAST BP 80-89 MM HG: CPT | Performed by: FAMILY MEDICINE

## 2024-06-27 PROCEDURE — 1160F RVW MEDS BY RX/DR IN RCRD: CPT | Performed by: FAMILY MEDICINE

## 2024-06-27 RX ORDER — CLOTRIMAZOLE AND BETAMETHASONE DIPROPIONATE 10; .64 MG/G; MG/G
1 CREAM TOPICAL 2 TIMES DAILY
Qty: 15 G | Refills: 5 | Status: SHIPPED | OUTPATIENT
Start: 2024-06-27

## 2024-06-27 NOTE — PROGRESS NOTES
Subjective   Naina Manning is a 74 y.o. female.     History of Present Illness  Oral thrush corners of mouth      The following portions of the patient's history were reviewed and updated as appropriate: allergies, current medications, past family history, past medical history, past social history, past surgical history, and problem list.    Review of Systems   Respiratory:  Negative for shortness of breath.    Cardiovascular:  Negative for chest pain and leg swelling.   Skin:  Positive for rash.        Oral thrush corners of mouth       Objective   Physical Exam  Vitals and nursing note reviewed.   Constitutional:       Appearance: Normal appearance.   Eyes:      Extraocular Movements: Extraocular movements intact.      Pupils: Pupils are equal, round, and reactive to light.   Cardiovascular:      Rate and Rhythm: Normal rate and regular rhythm.   Pulmonary:      Effort: Pulmonary effort is normal.      Breath sounds: Normal breath sounds.   Skin:     General: Skin is warm and dry.      Comments: Corners of mouth-oral thrush   Neurological:      General: No focal deficit present.      Mental Status: She is alert and oriented to person, place, and time.   Psychiatric:         Mood and Affect: Mood normal.         Behavior: Behavior normal.         Thought Content: Thought content normal.         Judgment: Judgment normal.         Assessment & Plan   Diagnoses and all orders for this visit:    1. Oral thrush (Primary)  -     clotrimazole-betamethasone (LOTRISONE) 1-0.05 % cream; Apply 1 Application topically to the appropriate area as directed 2 (Two) Times a Day.  Dispense: 15 g; Refill: 5       Plan above zinc oxide for skin moisture barrier at night           Answers submitted by the patient for this visit:  Other (Submitted on 6/25/2024)  Please describe your symptoms.: Cracking and irritation in the corners of the mouth,  tongue sensitive to heat.  Have you had these symptoms before?: No  How long have you been  having these symptoms?: 5-7 days  Primary Reason for Visit (Submitted on 6/25/2024)  What is the primary reason for your visit?: Other

## 2024-07-18 ENCOUNTER — OFFICE VISIT (OUTPATIENT)
Dept: FAMILY MEDICINE CLINIC | Facility: CLINIC | Age: 74
End: 2024-07-18
Payer: MEDICARE

## 2024-07-18 ENCOUNTER — PATIENT ROUNDING (BHMG ONLY) (OUTPATIENT)
Dept: FAMILY MEDICINE CLINIC | Facility: CLINIC | Age: 74
End: 2024-07-18

## 2024-07-18 VITALS
HEIGHT: 65 IN | SYSTOLIC BLOOD PRESSURE: 122 MMHG | OXYGEN SATURATION: 98 % | WEIGHT: 167.2 LBS | TEMPERATURE: 98 F | RESPIRATION RATE: 16 BRPM | BODY MASS INDEX: 27.86 KG/M2 | HEART RATE: 88 BPM | DIASTOLIC BLOOD PRESSURE: 76 MMHG

## 2024-07-18 DIAGNOSIS — Z00.00 MEDICARE ANNUAL WELLNESS VISIT, SUBSEQUENT: ICD-10-CM

## 2024-07-18 DIAGNOSIS — I10 ESSENTIAL HYPERTENSION: ICD-10-CM

## 2024-07-18 DIAGNOSIS — R53.83 FATIGUE, UNSPECIFIED TYPE: ICD-10-CM

## 2024-07-18 DIAGNOSIS — Z78.0 POSTMENOPAUSAL: ICD-10-CM

## 2024-07-18 DIAGNOSIS — R41.3 MEMORY LOSS: ICD-10-CM

## 2024-07-18 DIAGNOSIS — E78.2 MIXED HYPERLIPIDEMIA: Primary | ICD-10-CM

## 2024-07-18 DIAGNOSIS — Z12.4 SCREENING FOR MALIGNANT NEOPLASM OF THE CERVIX: ICD-10-CM

## 2024-07-18 DIAGNOSIS — Z12.31 SCREENING MAMMOGRAM FOR BREAST CANCER: ICD-10-CM

## 2024-07-18 DIAGNOSIS — E55.9 VITAMIN D DEFICIENCY: ICD-10-CM

## 2024-07-18 PROCEDURE — 3078F DIAST BP <80 MM HG: CPT | Performed by: FAMILY MEDICINE

## 2024-07-18 PROCEDURE — 3074F SYST BP LT 130 MM HG: CPT | Performed by: FAMILY MEDICINE

## 2024-07-18 PROCEDURE — G0439 PPPS, SUBSEQ VISIT: HCPCS | Performed by: FAMILY MEDICINE

## 2024-07-18 PROCEDURE — 1126F AMNT PAIN NOTED NONE PRSNT: CPT | Performed by: FAMILY MEDICINE

## 2024-07-18 PROCEDURE — 1159F MED LIST DOCD IN RCRD: CPT | Performed by: FAMILY MEDICINE

## 2024-07-18 PROCEDURE — 1170F FXNL STATUS ASSESSED: CPT | Performed by: FAMILY MEDICINE

## 2024-07-18 PROCEDURE — 1160F RVW MEDS BY RX/DR IN RCRD: CPT | Performed by: FAMILY MEDICINE

## 2024-07-18 NOTE — PATIENT INSTRUCTIONS
Advance Care Planning and Advance Directives     You make decisions on a daily basis - decisions about where you want to live, your career, your home, your life. Perhaps one of the most important decisions you face is your choice for future medical care. Take time to talk with your family and your healthcare team and start planning today.  Advance Care Planning is a process that can help you:  Understand possible future healthcare decisions in light of your own experiences  Reflect on those decision in light of your goals and values  Discuss your decisions with those closest to you and the healthcare professionals that care for you  Make a plan by creating a document that reflects your wishes    Surrogate Decision Maker  In the event of a medical emergency, which has left you unable to communicate or to make your own decisions, you would need someone to make decisions for you.  It is important to discuss your preferences for medical treatment with this person while you are in good health.     Qualities of a surrogate decision maker:  Willing to take on this role and responsibility  Knows what you want for future medical care  Willing to follow your wishes even if they don't agree with them  Able to make difficult medical decisions under stressful circumstances    Advance Directives  These are legal documents you can create that will guide your healthcare team and decision maker(s) when needed. These documents can be stored in the electronic medical record.    Living Will - a legal document to guide your care if you have a terminal condition or a serious illness and are unable to communicate. States vary by statute in document names/types, but most forms may include one or more of the following:        -  Directions regarding life-prolonging treatments        -  Directions regarding artificially provided nutrition/hydration        -  Choosing a healthcare decision maker        -  Direction regarding organ/tissue  donation    Durable Power of  for Healthcare - this document names an -in-fact to make medical decisions for you, but it may also allow this person to make personal and financial decisions for you. Please seek the advice of an  if you need this type of document.    **Advance Directives are not required and no one may discriminate against you if you do not sign one.    Medical Orders  Many states allow specific forms/orders signed by your physician to record your wishes for medical treatment in your current state of health. This form, signed in personal communication with your physician, addresses resuscitation and other medical interventions that you may or may not want.      For more information or to schedule a time with a [facility] Advance Care Planning Facilitator contact: [facility] or call [phone] and someone will contact you directly.    Medicare Wellness  Personal Prevention Plan of Service     Date of Office Visit:    Encounter Provider:  Juan Luis Zafar MD  Place of Service:  [facility]  Patient Name: Naina Manning  :  1950    As part of the Medicare Wellness portion of your visit today, we are providing you with this personalized preventive plan of services (PPPS). This plan is based upon recommendations of the [place] Preventive Services Task Force (USPSTF) and the Advisory Committee on Immunization Practices (ACIP).    This lists the preventive care services that should be considered, and provides dates of when you are due. Items listed as completed are up-to-date and do not require any further intervention.    Health Maintenance   Topic Date Due   • BMI FOLLOWUP  2024   • DXA SCAN  2024   • MAMMOGRAM  2024   • ANNUAL WELLNESS VISIT  2024 (Originally 2024)   • COVID-19 Vaccine (2023- season) 2024 (Originally 3/20/2024)   • TDAP/TD VACCINES (2 - Td or Tdap)  12/21/2024 (Originally 7/16/2023)   • INFLUENZA VACCINE  08/01/2024   • LIPID PANEL  03/22/2025   • PAP SMEAR  06/14/2025   • COLORECTAL CANCER SCREENING  07/25/2025   • HEPATITIS C SCREENING  Completed   • Pneumococcal Vaccine 65+  Completed   • ZOSTER VACCINE  Completed       No orders of the defined types were placed in this encounter.      No follow-ups on file.

## 2024-07-18 NOTE — PROGRESS NOTES
July 18, 2024    Hello, may I speak with Naina Manning?    My name is Aileen      I am  with [facility]  [address], [address]  [place] [address] [phone].    Before we get started may I verify your date of birth? 1950    I am calling to officially welcome you to our practice and ask about your recent visit. Is this a good time to talk? yes    Tell me about your visit with us. What things went well?  Everything went well!       We're always looking for ways to make our patients' experiences even better. Do you have recommendations on ways we may improve?  none    Overall were you satisfied with your first visit to our practice? yes       I appreciate you taking the time to speak with me today. Is there anything else I can do for you? no      Thank you, and have a great day.

## 2024-07-18 NOTE — PROGRESS NOTES
Subjective   The ABCs of the Annual Wellness Visit  Medicare Wellness Visit      Naina Manning is a 74 y.o. patient who presents for a Medicare Wellness Visit.    The following portions of the patient's history were reviewed and   updated as appropriate: allergies, current medications, past family history, past medical history, past social history, past surgical history, and problem list.    Compared to one year ago, the patient's physical   health is the same.  Compared to one year ago, the patient's mental   health is the same.    Recent Hospitalizations:  She was not admitted to the hospital during the last year.     Current Medical Providers:  Patient Care Team:  Juan Luis Zafar MD as PCP - General (Family Medicine)  Emeka Carranza MD as Consulting Physician (Cardiology)  Krunal Garner OD (Optometry)  Marie Malone (Internal Medicine)    Outpatient Medications Prior to Visit   Medication Sig Dispense Refill    acetaminophen (TYLENOL) 500 MG tablet Take 2 tablets by mouth 2 (Two) Times a Day.      albuterol sulfate  (90 Base) MCG/ACT inhaler Inhale 2 puffs Every 4 (Four) Hours As Needed for Wheezing. 6.7 g 2    amLODIPine (NORVASC) 10 MG tablet TAKE 1 TABLET DAILY 90 tablet 3    cholecalciferol (VITAMIN D3) 1000 units tablet Take 1 tablet by mouth Daily.      cloNIDine (CATAPRES) 0.1 MG tablet TAKE 1 TABLET EVERY NIGHT 90 tablet 3    cloNIDine (CATAPRES) 0.2 MG tablet TAKE 1 TABLET EVERY MORNING 90 tablet 3    clotrimazole-betamethasone (LOTRISONE) 1-0.05 % cream Apply 1 Application topically to the appropriate area as directed 2 (Two) Times a Day. 15 g 5    fluticasone (FLONASE) 50 MCG/ACT nasal spray USE 2 SPRAYS IN EACH NOSTRIL DAILY 16 g 11    hydrOXYzine (ATARAX) 25 MG tablet TAKE 1 TO 2 TABLETS EVERY 4 TO 6 HOURS AS NEEDED FOR ANXIETY 120 tablet 0    indapamide (LOZOL) 2.5 MG tablet TAKE 1 TABLET DAILY 90 tablet 3    levothyroxine (SYNTHROID, LEVOTHROID) 50 MCG tablet TAKE 1 TABLET  "EVERY MORNING 90 tablet 3    losartan (COZAAR) 100 MG tablet TAKE 1 TABLET EVERY MORNING 90 tablet 3    metFORMIN (GLUCOPHAGE) 500 MG tablet TAKE 1 TABLET TWICE A DAY WITH MEALS 180 tablet 3    omeprazole (priLOSEC) 40 MG capsule TAKE 1 CAPSULE DAILY 90 capsule 3    rosuvastatin (CRESTOR) 20 MG tablet Take 1 tablet by mouth Every Night. 90 tablet 1     No facility-administered medications prior to visit.     No opioid medication identified on active medication list. I have reviewed chart for other potential  high risk medication/s and harmful drug interactions in the elderly.      Aspirin is not on active medication list.  Aspirin use is not indicated based on review of current medical condition/s. Risk of harm outweighs potential benefits.  .    Patient Active Problem List   Diagnosis    Body mass index (BMI) of 25.0 to 29.9    Essential hypertension    Hypertension    Hypothyroid    Hypothyroidism    Seasonal allergic rhinitis     Advance Care Planning (Click this link to access ACP Navigator)  Advance Directive is on file.  ACP discussion was declined by the patient. Patient has an advance directive in EMR which is still valid.         Objective   Vitals:    07/18/24 0853   BP: 122/76   BP Location: Left arm   Patient Position: Sitting   Cuff Size: Adult   Pulse: 88   Resp: 16   Temp: 98 °F (36.7 °C)   SpO2: 98%   Weight: 75.8 kg (167 lb 3.2 oz)   Height: 165.1 cm (65\")   PainSc: 0-No pain       Estimated body mass index is 27.82 kg/m² as calculated from the following:    Height as of this encounter: 165.1 cm (65\").    Weight as of this encounter: 75.8 kg (167 lb 3.2 oz).    BMI is >= 25 and <30. (Overweight) The following options were offered after discussion;: exercise counseling/recommendations and nutrition counseling/recommendations        Does the patient have evidence of cognitive impairment? No                                                                                                Health  Risk " Assessment    Smoking Status:  Social History     Tobacco Use   Smoking Status Never   Smokeless Tobacco Never     Alcohol Consumption:  Social History     Substance and Sexual Activity   Alcohol Use Not Currently    Comment: occ     Fall Risk Screen:  STEADI Fall Risk Assessment was completed, and patient is at HIGH risk for falls. Assessment completed on:2024    Depression Screenin/18/2024     8:55 AM   PHQ-2/PHQ-9 Depression Screening   Little Interest or Pleasure in Doing Things 0-->not at all   Feeling Down, Depressed or Hopeless 0-->not at all   PHQ-9: Brief Depression Severity Measure Score 0     Health Habits and Functional and Cognitive Screenin/18/2024     8:56 AM   Functional & Cognitive Status   Do you have difficulty preparing food and eating? No   Do you have difficulty bathing yourself, getting dressed or grooming yourself? No   Do you have difficulty using the toilet? No   Do you have difficulty moving around from place to place? No   Do you have trouble with steps or getting out of a bed or a chair? No   Current Diet Well Balanced Diet   Dental Exam Up to date   Eye Exam Up to date   Exercise (times per week) 3 times per week   Current Exercises Include Walking;Stationary Bicycling/Spin Class   Do you need help using the phone?  No   Are you deaf or do you have serious difficulty hearing?  No   Do you need help to go to places out of walking distance? No   Do you need help shopping? No   Do you need help preparing meals?  No   Do you need help with housework?  No   Do you need help with laundry? No   Do you need help taking your medications? No   Do you need help managing money? No   Do you ever drive or ride in a car without wearing a seat belt? No   Have you felt unusual stress, anger or loneliness in the last month? No   Who do you live with? Spouse   If you need help, do you have trouble finding someone available to you? No   Have you been bothered in the last four weeks  by sexual problems? No   Do you have difficulty concentrating, remembering or making decisions? No             Age-appropriate Screening Schedule:  Refer to the list below for future screening recommendations based on patient's age, sex and/or medical conditions. Orders for these recommended tests are listed in the plan section. The patient has been provided with a written plan.    Health Maintenance List  Health Maintenance   Topic Date Due    BMI FOLLOWUP  06/21/2024    DXA SCAN  07/06/2024    MAMMOGRAM  07/07/2024    ANNUAL WELLNESS VISIT  07/18/2024 (Originally 6/21/2024)    COVID-19 Vaccine (6 - 2023-24 season) 09/16/2024 (Originally 3/20/2024)    TDAP/TD VACCINES (2 - Td or Tdap) 12/21/2024 (Originally 7/16/2023)    INFLUENZA VACCINE  08/01/2024    LIPID PANEL  03/22/2025    PAP SMEAR  06/14/2025    COLORECTAL CANCER SCREENING  07/25/2025    HEPATITIS C SCREENING  Completed    Pneumococcal Vaccine 65+  Completed    ZOSTER VACCINE  Completed                                                                                                                                                CMS Preventative Services Quick Reference  Risk Factors Identified During Encounter  Fall Risk-High or Moderate: Information on Fall Prevention Shared in After Visit Summary    The above risks/problems have been discussed with the patient.  Pertinent information has been shared with the patient in the After Visit Summary.  An After Visit Summary and PPPS were made available to the patient.    Follow Up:   Next Medicare Wellness visit to be scheduled in 1 year.         Additional E&M Note during same encounter follows:  Patient has additional, significant, and separately identifiable condition(s)/problem(s) that require work above and beyond the Medicare Wellness Visit     Chief Complaint  Medicare Wellness-subsequent (Fasting with pap)    Subjective   74-year-old female for annual Medicare exam      Naina is also being seen today  "for an annual adult preventative physical exam.     Review of Systems   Respiratory:  Negative for shortness of breath.    Cardiovascular:  Negative for chest pain and leg swelling.   Gastrointestinal:         Colon screening up-to-date   Genitourinary: Negative.    Musculoskeletal:  Positive for arthralgias.   Neurological:  Negative for dizziness and light-headedness.   All other systems reviewed and are negative.             Objective   Vital Signs:  /76 (BP Location: Left arm, Patient Position: Sitting, Cuff Size: Adult)   Pulse 88   Temp 98 °F (36.7 °C)   Resp 16   Ht 165.1 cm (65\")   Wt 75.8 kg (167 lb 3.2 oz)   SpO2 98%   BMI 27.82 kg/m²   Physical Exam  Vitals and nursing note reviewed.   Constitutional:       Appearance: Normal appearance.   HENT:      Right Ear: Tympanic membrane and ear canal normal.      Left Ear: Tympanic membrane and ear canal normal.      Mouth/Throat:      Mouth: Mucous membranes are moist.   Eyes:      Extraocular Movements: Extraocular movements intact.      Pupils: Pupils are equal, round, and reactive to light.   Neck:      Vascular: No carotid bruit.   Cardiovascular:      Rate and Rhythm: Normal rate and regular rhythm.   Pulmonary:      Effort: Pulmonary effort is normal.      Breath sounds: Normal breath sounds.      Comments: Breast no masses noted  Abdominal:      General: Abdomen is flat.      Palpations: Abdomen is soft. There is no mass.   Genitourinary:     General: Normal vulva.      Comments: Urethra vulva normal vaginal mucosa normal cervix present Pap smear taken uterus normal size no adnexal masses  Musculoskeletal:      Right lower leg: No edema.      Left lower leg: No edema.   Lymphadenopathy:      Cervical: No cervical adenopathy.   Skin:     General: Skin is warm and dry.   Neurological:      General: No focal deficit present.      Mental Status: She is alert and oriented to person, place, and time.   Psychiatric:         Mood and Affect: Mood " normal.         Behavior: Behavior normal.         Thought Content: Thought content normal.         Judgment: Judgment normal.     BMI is >= 25 and <30. (Overweight) The following options were offered after discussion;: exercise counseling/recommendations and nutrition counseling/recommendations             Assessment and Plan     Plan above          Mixed hyperlipidemia     Fatigue, unspecified type    Essential hypertension    Vitamin D deficiency    Memory loss    Screening mammogram for breast cancer    Postmenopausal    Medicare annual wellness visit, subsequent    Screening for malignant neoplasm of the cervix      Orders Placed This Encounter   Procedures    Mammo Screening Digital Tomosynthesis Bilateral With CAD     Standing Status:   Future     Standing Expiration Date:   7/18/2025     Order Specific Question:   Reason for Exam:     Answer:   Screening     Order Specific Question:   Release to patient     Answer:   Routine Release [[id number]]     Order Specific Question:   Does this patient have a diabetic monitoring/medication delivering device on?     Answer:   No    DEXA Bone Density Axial     Standing Status:   Future     Standing Expiration Date:   7/18/2025     Order Specific Question:   Reason for Exam:     Answer:   Screening     Order Specific Question:   Does this patient have a diabetic monitoring/medication delivering device on?     Answer:   No     Order Specific Question:   Release to patient     Answer:   Routine Release [[id number]]     Order Specific Question:   Is patient taking or have taken long term Glucocorticoid (steroids)?     Answer:   No     Order Specific Question:   Does the patient have rheumatoid arthritis?     Answer:   No     Order Specific Question:   Does the patient have secondary osteoporosis?     Answer:   No    TSH     Order Specific Question:   Release to patient     Answer:   Routine Release [[id number]]    T4, free     Order Specific Question:   Release to patient      Answer:   Routine Release [[id number]]    Comprehensive Metabolic Panel     Order Specific Question:   Release to patient     Answer:   Routine Release [[id number]]    Lipid Panel     Order Specific Question:   Release to patient     Answer:   Routine Release [[id number]]    Vitamin D,25-Hydroxy     Order Specific Question:   Release to patient     Answer:   Routine Release [[id number]]    Vitamin B12     Order Specific Question:   Release to patient     Answer:   Routine Release [[id number]]    Folate     Order Specific Question:   Release to patient     Answer:   Routine Release [[id number]]    CBC & Differential     Order Specific Question:   Manual Differential     Answer:   No     Order Specific Question:   Release to patient     Answer:   Routine Release [[id number]]             Follow Up   Return in about 6 months (around 1/18/2025), or if symptoms worsen or fail to improve.  Patient was given instructions and counseling regarding her condition or for health maintenance advice. Please see specific information pulled into the AVS if appropriate.

## 2024-07-19 LAB
25(OH)D3+25(OH)D2 SERPL-MCNC: 46.3 NG/ML (ref 30–100)
ALBUMIN SERPL-MCNC: 4.6 G/DL (ref 3.5–5.2)
ALBUMIN/GLOB SERPL: 1.7 G/DL
ALP SERPL-CCNC: 76 U/L (ref 39–117)
ALT SERPL-CCNC: 11 U/L (ref 1–33)
AST SERPL-CCNC: 14 U/L (ref 1–32)
BASOPHILS # BLD AUTO: 0.05 10*3/MM3 (ref 0–0.2)
BASOPHILS NFR BLD AUTO: 0.8 % (ref 0–1.5)
BILIRUB SERPL-MCNC: 0.5 MG/DL (ref 0–1.2)
BUN SERPL-MCNC: 11 MG/DL (ref 8–23)
BUN/CREAT SERPL: 16.2 (ref 7–25)
CALCIUM SERPL-MCNC: 8.7 MG/DL (ref 8.6–10.5)
CHLORIDE SERPL-SCNC: 99 MMOL/L (ref 98–107)
CHOLEST SERPL-MCNC: 113 MG/DL (ref 0–200)
CO2 SERPL-SCNC: 29.3 MMOL/L (ref 22–29)
CREAT SERPL-MCNC: 0.68 MG/DL (ref 0.57–1)
EGFRCR SERPLBLD CKD-EPI 2021: 92.7 ML/MIN/1.73
EOSINOPHIL # BLD AUTO: 0.07 10*3/MM3 (ref 0–0.4)
EOSINOPHIL NFR BLD AUTO: 1.1 % (ref 0.3–6.2)
ERYTHROCYTE [DISTWIDTH] IN BLOOD BY AUTOMATED COUNT: 12.5 % (ref 12.3–15.4)
FOLATE SERPL-MCNC: 9.51 NG/ML (ref 4.78–24.2)
GLOBULIN SER CALC-MCNC: 2.7 GM/DL
GLUCOSE SERPL-MCNC: 95 MG/DL (ref 65–99)
HCT VFR BLD AUTO: 39.6 % (ref 34–46.6)
HDLC SERPL-MCNC: 45 MG/DL (ref 40–60)
HGB BLD-MCNC: 12.8 G/DL (ref 12–15.9)
IMM GRANULOCYTES # BLD AUTO: 0.01 10*3/MM3 (ref 0–0.05)
IMM GRANULOCYTES NFR BLD AUTO: 0.2 % (ref 0–0.5)
LDLC SERPL CALC-MCNC: 50 MG/DL (ref 0–100)
LYMPHOCYTES # BLD AUTO: 2.1 10*3/MM3 (ref 0.7–3.1)
LYMPHOCYTES NFR BLD AUTO: 32.3 % (ref 19.6–45.3)
MCH RBC QN AUTO: 28.9 PG (ref 26.6–33)
MCHC RBC AUTO-ENTMCNC: 32.3 G/DL (ref 31.5–35.7)
MCV RBC AUTO: 89.4 FL (ref 79–97)
MONOCYTES # BLD AUTO: 0.44 10*3/MM3 (ref 0.1–0.9)
MONOCYTES NFR BLD AUTO: 6.8 % (ref 5–12)
NEUTROPHILS # BLD AUTO: 3.84 10*3/MM3 (ref 1.7–7)
NEUTROPHILS NFR BLD AUTO: 58.8 % (ref 42.7–76)
NRBC BLD AUTO-RTO: 0 /100 WBC (ref 0–0.2)
PLATELET # BLD AUTO: 430 10*3/MM3 (ref 140–450)
POTASSIUM SERPL-SCNC: 4.5 MMOL/L (ref 3.5–5.2)
PROT SERPL-MCNC: 7.3 G/DL (ref 6–8.5)
RBC # BLD AUTO: 4.43 10*6/MM3 (ref 3.77–5.28)
SODIUM SERPL-SCNC: 139 MMOL/L (ref 136–145)
T4 FREE SERPL-MCNC: 1.29 NG/DL (ref 0.92–1.68)
TRIGL SERPL-MCNC: 91 MG/DL (ref 0–150)
TSH SERPL DL<=0.005 MIU/L-ACNC: 1.15 UIU/ML (ref 0.27–4.2)
VIT B12 SERPL-MCNC: 372 PG/ML (ref 211–946)
VLDLC SERPL CALC-MCNC: 18 MG/DL (ref 5–40)
WBC # BLD AUTO: 6.51 10*3/MM3 (ref 3.4–10.8)

## 2024-07-22 LAB
CYTOLOGIST CVX/VAG CYTO: NORMAL
CYTOLOGY CVX/VAG DOC CYTO: NORMAL
CYTOLOGY CVX/VAG DOC THIN PREP: NORMAL
DX ICD CODE: NORMAL
Lab: NORMAL
OTHER STN SPEC: NORMAL
STAT OF ADQ CVX/VAG CYTO-IMP: NORMAL

## 2024-07-29 ENCOUNTER — OFFICE VISIT (OUTPATIENT)
Dept: FAMILY MEDICINE CLINIC | Facility: CLINIC | Age: 74
End: 2024-07-29
Payer: MEDICARE

## 2024-07-29 VITALS
WEIGHT: 165.2 LBS | OXYGEN SATURATION: 98 % | HEART RATE: 96 BPM | HEIGHT: 65 IN | SYSTOLIC BLOOD PRESSURE: 134 MMHG | DIASTOLIC BLOOD PRESSURE: 85 MMHG | TEMPERATURE: 98.7 F | BODY MASS INDEX: 27.52 KG/M2

## 2024-07-29 DIAGNOSIS — R35.0 URINARY FREQUENCY: Primary | ICD-10-CM

## 2024-07-29 DIAGNOSIS — N32.89 BLADDER SPASMS: ICD-10-CM

## 2024-07-29 PROCEDURE — 3075F SYST BP GE 130 - 139MM HG: CPT | Performed by: NURSE PRACTITIONER

## 2024-07-29 PROCEDURE — 1126F AMNT PAIN NOTED NONE PRSNT: CPT | Performed by: NURSE PRACTITIONER

## 2024-07-29 PROCEDURE — 81003 URINALYSIS AUTO W/O SCOPE: CPT | Performed by: NURSE PRACTITIONER

## 2024-07-29 PROCEDURE — 99213 OFFICE O/P EST LOW 20 MIN: CPT | Performed by: NURSE PRACTITIONER

## 2024-07-29 PROCEDURE — 3079F DIAST BP 80-89 MM HG: CPT | Performed by: NURSE PRACTITIONER

## 2024-07-29 PROCEDURE — 1159F MED LIST DOCD IN RCRD: CPT | Performed by: NURSE PRACTITIONER

## 2024-07-29 PROCEDURE — 1160F RVW MEDS BY RX/DR IN RCRD: CPT | Performed by: NURSE PRACTITIONER

## 2024-07-29 RX ORDER — FLAVOXATE HYDROCHLORIDE 100 MG/1
100 TABLET ORAL 3 TIMES DAILY PRN
Qty: 30 TABLET | Refills: 0 | Status: SHIPPED | OUTPATIENT
Start: 2024-07-29

## 2024-08-21 DIAGNOSIS — I10 ESSENTIAL HYPERTENSION: ICD-10-CM

## 2024-08-21 DIAGNOSIS — E03.9 HYPOTHYROIDISM, UNSPECIFIED TYPE: ICD-10-CM

## 2024-08-21 DIAGNOSIS — E78.2 MIXED HYPERLIPIDEMIA: ICD-10-CM

## 2024-08-21 RX ORDER — INDAPAMIDE 2.5 MG/1
TABLET ORAL
Qty: 90 TABLET | Refills: 3 | Status: SHIPPED | OUTPATIENT
Start: 2024-08-21

## 2024-08-21 RX ORDER — AMLODIPINE BESYLATE 10 MG/1
TABLET ORAL
Qty: 90 TABLET | Refills: 3 | Status: SHIPPED | OUTPATIENT
Start: 2024-08-21

## 2024-08-21 RX ORDER — LEVOTHYROXINE SODIUM 0.05 MG/1
TABLET ORAL
Qty: 90 TABLET | Refills: 3 | Status: SHIPPED | OUTPATIENT
Start: 2024-08-21

## 2024-08-21 RX ORDER — LOSARTAN POTASSIUM 100 MG/1
TABLET ORAL
Qty: 90 TABLET | Refills: 3 | Status: SHIPPED | OUTPATIENT
Start: 2024-08-21

## 2024-08-21 RX ORDER — CLONIDINE HYDROCHLORIDE 0.1 MG/1
TABLET ORAL
Qty: 90 TABLET | Refills: 3 | Status: SHIPPED | OUTPATIENT
Start: 2024-08-21

## 2024-08-21 RX ORDER — CLONIDINE HYDROCHLORIDE 0.2 MG/1
TABLET ORAL
Qty: 90 TABLET | Refills: 3 | Status: SHIPPED | OUTPATIENT
Start: 2024-08-21

## 2024-08-21 RX ORDER — ROSUVASTATIN CALCIUM 20 MG/1
20 TABLET, COATED ORAL NIGHTLY
Qty: 90 TABLET | Refills: 3 | Status: SHIPPED | OUTPATIENT
Start: 2024-08-21

## 2024-08-21 NOTE — TELEPHONE ENCOUNTER
Caller: Sarbjit Manninga    Relationship: Self    Best call back number:     [phone] (Mobile)       Requested Prescriptions:   Requested Prescriptions     Pending Prescriptions Disp Refills    rosuvastatin (CRESTOR) 20 MG tablet 90 tablet 1     Sig: Take 1 tablet by mouth Every Night.        Pharmacy where request should be sent: [facility] [address] [phone] [facility] [phone]      Last office visit with prescribing clinician: 7/18/2024   Last telemedicine visit with prescribing clinician: Visit date not found   Next office visit with prescribing clinician: 2/13/2025         Does the patient have less than a 3 day supply:  [] Yes  [x] No    Farzad Galindo Rep   08/21/24 10:46 CDT

## 2024-08-21 NOTE — TELEPHONE ENCOUNTER
Rx Refill Note  Requested Prescriptions     Pending Prescriptions Disp Refills    rosuvastatin (CRESTOR) 20 MG tablet 90 tablet 1     Sig: Take 1 tablet by mouth Every Night.      Last office visit with prescribing clinician: 7/29/24   Last telemedicine visit with prescribing clinician: Visit date not found   Next office visit with prescribing clinician: 2/13/2025       {TIP  Please add Last Relevant Lab 7/18/24    Kaycee Mora MA  08/21/24, 10:47 CDT

## 2024-08-21 NOTE — TELEPHONE ENCOUNTER
Rx Refill Note  Requested Prescriptions     Pending Prescriptions Disp Refills    levothyroxine (SYNTHROID, LEVOTHROID) 50 MCG tablet [Pharmacy Med Name: L-THYROXINE (SYNTHROID) TABS 50MCG] 90 tablet 3     Sig: TAKE 1 TABLET EVERY MORNING    losartan (COZAAR) 100 MG tablet [Pharmacy Med Name: LOSARTAN TABS 100MG] 90 tablet 3     Sig: TAKE 1 TABLET EVERY MORNING    amLODIPine (NORVASC) 10 MG tablet [Pharmacy Med Name: AMLODIPINE BESYLATE TABS 10MG] 90 tablet 3     Sig: TAKE 1 TABLET DAILY    indapamide (LOZOL) 2.5 MG tablet [Pharmacy Med Name: INDAPAMIDE TABS 2.5MG] 90 tablet 3     Sig: TAKE 1 TABLET DAILY    cloNIDine (CATAPRES) 0.1 MG tablet [Pharmacy Med Name: CLONIDINE HCL TABS 0.1MG] 90 tablet 3     Sig: TAKE 1 TABLET EVERY NIGHT    cloNIDine (CATAPRES) 0.2 MG tablet [Pharmacy Med Name: CLONIDINE HCL TABS 0.2MG] 90 tablet 3     Sig: TAKE 1 TABLET EVERY MORNING      Last office visit with prescribing clinician: 7/29/24   Last telemedicine visit with prescribing clinician: Visit date not found   Next office visit with prescribing clinician: 2/13/2025       {TIP  Please add Last Relevant Lab 7/18/24    Kaycee Mora MA  08/21/24, 10:48 CDT

## 2024-10-07 ENCOUNTER — FLU SHOT (OUTPATIENT)
Dept: FAMILY MEDICINE CLINIC | Facility: CLINIC | Age: 74
End: 2024-10-07
Payer: MEDICARE

## 2024-10-07 DIAGNOSIS — Z23 NEED FOR INFLUENZA VACCINATION: Primary | ICD-10-CM

## 2024-10-07 PROCEDURE — 90662 IIV NO PRSV INCREASED AG IM: CPT | Performed by: NURSE PRACTITIONER

## 2024-10-07 PROCEDURE — G0008 ADMIN INFLUENZA VIRUS VAC: HCPCS | Performed by: NURSE PRACTITIONER

## 2025-02-13 ENCOUNTER — OFFICE VISIT (OUTPATIENT)
Dept: FAMILY MEDICINE CLINIC | Facility: CLINIC | Age: 75
End: 2025-02-13
Payer: MEDICARE

## 2025-02-13 VITALS
DIASTOLIC BLOOD PRESSURE: 78 MMHG | RESPIRATION RATE: 18 BRPM | WEIGHT: 168.2 LBS | SYSTOLIC BLOOD PRESSURE: 136 MMHG | OXYGEN SATURATION: 98 % | BODY MASS INDEX: 28.02 KG/M2 | HEIGHT: 65 IN | HEART RATE: 84 BPM | TEMPERATURE: 97.1 F

## 2025-02-13 DIAGNOSIS — E78.2 MIXED HYPERLIPIDEMIA: ICD-10-CM

## 2025-02-13 DIAGNOSIS — E87.6 HYPOKALEMIA: Primary | ICD-10-CM

## 2025-02-13 PROCEDURE — 1159F MED LIST DOCD IN RCRD: CPT | Performed by: FAMILY MEDICINE

## 2025-02-13 PROCEDURE — 99213 OFFICE O/P EST LOW 20 MIN: CPT | Performed by: FAMILY MEDICINE

## 2025-02-13 PROCEDURE — 1126F AMNT PAIN NOTED NONE PRSNT: CPT | Performed by: FAMILY MEDICINE

## 2025-02-13 PROCEDURE — 3075F SYST BP GE 130 - 139MM HG: CPT | Performed by: FAMILY MEDICINE

## 2025-02-13 PROCEDURE — 1160F RVW MEDS BY RX/DR IN RCRD: CPT | Performed by: FAMILY MEDICINE

## 2025-02-13 PROCEDURE — 3078F DIAST BP <80 MM HG: CPT | Performed by: FAMILY MEDICINE

## 2025-02-13 NOTE — PROGRESS NOTES
Subjective   Naina Manning is a 75 y.o. female.     History of Present Illness  75-year-old female follow-up on hypertension hyperlipidemia      The following portions of the patient's history were reviewed and updated as appropriate: allergies, current medications, past family history, past medical history, past social history, past surgical history, and problem list.    Review of Systems   Respiratory:  Negative for shortness of breath.    Cardiovascular:  Negative for chest pain and leg swelling.        Borderline hypokalemia-continue PNR just check levels 3 months do not need to see   Musculoskeletal:  Positive for arthralgias.   Neurological:  Negative for light-headedness.       Objective   Physical Exam  Vitals and nursing note reviewed.   Constitutional:       Appearance: Normal appearance.   HENT:      Mouth/Throat:      Mouth: Mucous membranes are moist.   Eyes:      Extraocular Movements: Extraocular movements intact.      Pupils: Pupils are equal, round, and reactive to light.   Neck:      Vascular: No carotid bruit.   Cardiovascular:      Rate and Rhythm: Normal rate and regular rhythm.   Pulmonary:      Effort: Pulmonary effort is normal.      Breath sounds: Normal breath sounds.   Abdominal:      Palpations: Abdomen is soft. There is no mass.   Musculoskeletal:      Right lower leg: No edema.      Left lower leg: No edema.   Skin:     General: Skin is warm and dry.   Neurological:      General: No focal deficit present.      Mental Status: She is alert and oriented to person, place, and time.   Psychiatric:         Mood and Affect: Mood normal.         Behavior: Behavior normal.         Thought Content: Thought content normal.         Judgment: Judgment normal.         Assessment & Plan   Diagnoses and all orders for this visit:    1. Hypokalemia (Primary)  -     Basic Metabolic Panel; Future    2. Mixed hyperlipidemia       Plan above

## 2025-02-20 DIAGNOSIS — F41.9 ANXIETY: ICD-10-CM

## 2025-02-20 RX ORDER — HYDROXYZINE HYDROCHLORIDE 25 MG/1
TABLET, FILM COATED ORAL
Qty: 120 TABLET | Refills: 3 | Status: SHIPPED | OUTPATIENT
Start: 2025-02-20 | End: 2025-02-21 | Stop reason: SDUPTHER

## 2025-02-20 NOTE — TELEPHONE ENCOUNTER
Rx Refill Note  Requested Prescriptions     Pending Prescriptions Disp Refills    hydrOXYzine (ATARAX) 25 MG tablet [Pharmacy Med Name: HYDROXYZINE HCL TABS 25MG] 120 tablet 35     Sig: TAKE 1 TO 2 TABLETS EVERY 4 TO 6 HOURS AS NEEDED FOR ANXIETY      Last office visit with prescribing clinician: 2/13/2025   Last telemedicine visit with prescribing clinician: Visit date not found   Next office visit with prescribing clinician: 7/24/2025     Kaycee Mora MA  02/20/25, 09:54 CST

## 2025-02-21 DIAGNOSIS — F41.9 ANXIETY: ICD-10-CM

## 2025-02-21 DIAGNOSIS — R73.03 PREDIABETES: ICD-10-CM

## 2025-02-21 RX ORDER — HYDROXYZINE HYDROCHLORIDE 25 MG/1
TABLET, FILM COATED ORAL
Qty: 120 TABLET | Refills: 3 | Status: SHIPPED | OUTPATIENT
Start: 2025-02-21

## 2025-02-21 NOTE — TELEPHONE ENCOUNTER
Rx Refill Note  Requested Prescriptions     Pending Prescriptions Disp Refills    metFORMIN (GLUCOPHAGE) 500 MG tablet 180 tablet 3     Sig: Take 1 tablet by mouth 2 (Two) Times a Day With Meals.    hydrOXYzine (ATARAX) 25 MG tablet 120 tablet 3     Sig: TAKE 1 TO 2 TABLETS EVERY 4 TO 6 HOURS AS NEEDED FOR ANXIETY          Last office visit with prescribing clinician: 2/13/2025   Last telemedicine visit with prescribing clinician: Visit date not found   Next office visit with prescribing clinician: 7/24/2025                         Would you like a call back once the refill request has been completed: [] Yes [] No    If the office needs to give you a call back, can they leave a voicemail: [] Yes [] No    Rosa Elena Hart MA  02/21/25, 08:19 CST

## 2025-03-07 ENCOUNTER — TELEPHONE (OUTPATIENT)
Dept: FAMILY MEDICINE CLINIC | Facility: CLINIC | Age: 75
End: 2025-03-07
Payer: MEDICARE

## 2025-04-14 RX ORDER — OMEPRAZOLE 40 MG/1
40 CAPSULE, DELAYED RELEASE ORAL DAILY
Qty: 90 CAPSULE | Refills: 3 | Status: SHIPPED | OUTPATIENT
Start: 2025-04-14

## 2025-04-14 NOTE — TELEPHONE ENCOUNTER
Rx Refill Note  Requested Prescriptions     Pending Prescriptions Disp Refills    omeprazole (priLOSEC) 40 MG capsule [Pharmacy Med Name: OMEPRAZOLE DR CAPS 40MG] 90 capsule 3     Sig: TAKE 1 CAPSULE DAILY      Last office visit with prescribing clinician: 2/13/2025   Last telemedicine visit with prescribing clinician: Visit date not found   Next office visit with prescribing clinician: 7/24/2025   CPE done                       Would you like a call back once the refill request has been completed: [] Yes [] No    If the office needs to give you a call back, can they leave a voicemail: [] Yes [] No    Kaycee Branch MA  04/14/25, 16:07 CDT

## 2025-04-16 RX ORDER — FLUTICASONE PROPIONATE 50 MCG
2 SPRAY, SUSPENSION (ML) NASAL DAILY
Qty: 48 G | Refills: 3 | Status: SHIPPED | OUTPATIENT
Start: 2025-04-16

## 2025-04-16 NOTE — TELEPHONE ENCOUNTER
Rx Refill Note  Requested Prescriptions     Pending Prescriptions Disp Refills    fluticasone (FLONASE) 50 MCG/ACT nasal spray 48 g 3     Si sprays by Each Nare route Daily.      Last office visit with prescribing clinician: 2025   Last telemedicine visit with prescribing clinician: Visit date not found   Next office visit with prescribing clinician: 2025                         Would you like a call back once the refill request has been completed: [] Yes [] No    If the office needs to give you a call back, can they leave a voicemail: [] Yes [] No    Farzad Cormier Rep  25, 08:46 CDT

## 2025-05-15 LAB
BUN SERPL-MCNC: 13 MG/DL (ref 8–23)
BUN/CREAT SERPL: 18.1 (ref 7–25)
CALCIUM SERPL-MCNC: 10.2 MG/DL (ref 8.6–10.5)
CHLORIDE SERPL-SCNC: 97 MMOL/L (ref 98–107)
CO2 SERPL-SCNC: 29.3 MMOL/L (ref 22–29)
CREAT SERPL-MCNC: 0.72 MG/DL (ref 0.57–1)
EGFRCR SERPLBLD CKD-EPI 2021: 88.4 ML/MIN/1.73
GLUCOSE SERPL-MCNC: 105 MG/DL (ref 65–99)
POTASSIUM SERPL-SCNC: 4.4 MMOL/L (ref 3.5–5.2)
SODIUM SERPL-SCNC: 140 MMOL/L (ref 136–145)

## 2025-06-17 DIAGNOSIS — I10 ESSENTIAL HYPERTENSION: ICD-10-CM

## 2025-06-17 DIAGNOSIS — E78.2 MIXED HYPERLIPIDEMIA: ICD-10-CM

## 2025-06-17 DIAGNOSIS — E03.9 HYPOTHYROIDISM, UNSPECIFIED TYPE: ICD-10-CM

## 2025-06-17 RX ORDER — ROSUVASTATIN CALCIUM 20 MG/1
20 TABLET, COATED ORAL NIGHTLY
Qty: 90 TABLET | Refills: 3 | Status: SHIPPED | OUTPATIENT
Start: 2025-06-17

## 2025-06-17 RX ORDER — CLONIDINE HYDROCHLORIDE 0.1 MG/1
0.1 TABLET ORAL EVERY MORNING
Qty: 90 TABLET | Refills: 3 | Status: SHIPPED | OUTPATIENT
Start: 2025-06-17

## 2025-06-17 RX ORDER — AMLODIPINE BESYLATE 10 MG/1
10 TABLET ORAL NIGHTLY
Qty: 90 TABLET | Refills: 3 | Status: SHIPPED | OUTPATIENT
Start: 2025-06-17

## 2025-06-17 RX ORDER — LEVOTHYROXINE SODIUM 50 UG/1
50 TABLET ORAL EVERY MORNING
Qty: 90 TABLET | Refills: 3 | Status: SHIPPED | OUTPATIENT
Start: 2025-06-17

## 2025-06-17 RX ORDER — CLONIDINE HYDROCHLORIDE 0.2 MG/1
0.2 TABLET ORAL NIGHTLY
Qty: 90 TABLET | Refills: 3 | Status: SHIPPED | OUTPATIENT
Start: 2025-06-17

## 2025-06-17 RX ORDER — INDAPAMIDE 2.5 MG/1
2.5 TABLET ORAL EVERY MORNING
Qty: 90 TABLET | Refills: 3 | Status: SHIPPED | OUTPATIENT
Start: 2025-06-17

## 2025-06-17 NOTE — TELEPHONE ENCOUNTER
Rx Refill Note  Requested Prescriptions     Pending Prescriptions Disp Refills    rosuvastatin (CRESTOR) 20 MG tablet 90 tablet 3     Sig: Take 1 tablet by mouth Every Night.    levothyroxine (SYNTHROID, LEVOTHROID) 50 MCG tablet 90 tablet 3     Sig: Take 1 tablet by mouth Every Morning.    amLODIPine (NORVASC) 10 MG tablet 90 tablet 3     Sig: Take 1 tablet by mouth Every Night.    indapamide (LOZOL) 2.5 MG tablet 90 tablet 3     Sig: Take 1 tablet by mouth Every Morning.    cloNIDine (CATAPRES) 0.1 MG tablet 90 tablet 3     Sig: Take 1 tablet by mouth Every Morning.    cloNIDine (CATAPRES) 0.2 MG tablet 90 tablet 3     Sig: Take 1 tablet by mouth Every Night.      Last office visit with prescribing clinician: 2/13/2025   Last telemedicine visit with prescribing clinician: Visit date not found   Next office visit with prescribing clinician: 7/24/2025                         Would you like a call back once the refill request has been completed: [] Yes [] No    If the office needs to give you a call back, can they leave a voicemail: [] Yes [] No    Farzad Cormier Rep  06/17/25, 10:11 CDT

## 2025-07-24 ENCOUNTER — OFFICE VISIT (OUTPATIENT)
Dept: FAMILY MEDICINE CLINIC | Facility: CLINIC | Age: 75
End: 2025-07-24
Payer: MEDICARE

## 2025-07-24 VITALS
OXYGEN SATURATION: 96 % | WEIGHT: 167.2 LBS | DIASTOLIC BLOOD PRESSURE: 81 MMHG | HEART RATE: 99 BPM | SYSTOLIC BLOOD PRESSURE: 120 MMHG | BODY MASS INDEX: 27.86 KG/M2 | TEMPERATURE: 98.2 F | HEIGHT: 65 IN

## 2025-07-24 DIAGNOSIS — E78.2 MIXED HYPERLIPIDEMIA: Primary | ICD-10-CM

## 2025-07-24 DIAGNOSIS — R73.03 PREDIABETES: ICD-10-CM

## 2025-07-24 DIAGNOSIS — Z12.12 SCREENING FOR COLORECTAL CANCER: ICD-10-CM

## 2025-07-24 DIAGNOSIS — Z00.00 MEDICARE ANNUAL WELLNESS VISIT, SUBSEQUENT: ICD-10-CM

## 2025-07-24 DIAGNOSIS — E55.9 VITAMIN D DEFICIENCY: ICD-10-CM

## 2025-07-24 DIAGNOSIS — Z12.31 ENCOUNTER FOR SCREENING MAMMOGRAM FOR MALIGNANT NEOPLASM OF BREAST: ICD-10-CM

## 2025-07-24 DIAGNOSIS — R41.3 MEMORY LOSS: ICD-10-CM

## 2025-07-24 DIAGNOSIS — R53.83 FATIGUE, UNSPECIFIED TYPE: ICD-10-CM

## 2025-07-24 DIAGNOSIS — E03.9 HYPOTHYROIDISM, UNSPECIFIED TYPE: ICD-10-CM

## 2025-07-24 DIAGNOSIS — Z12.11 SCREENING FOR COLORECTAL CANCER: ICD-10-CM

## 2025-07-24 RX ORDER — AZITHROMYCIN 250 MG/1
TABLET, FILM COATED ORAL
COMMUNITY
Start: 2025-07-22 | End: 2025-07-24

## 2025-07-24 NOTE — PROGRESS NOTES
Subjective   The ABCs of the Annual Wellness Visit  Medicare Wellness Visit      Naina Manning is a 75 y.o. patient who presents for a Medicare Wellness Visit.    The following portions of the patient's history were reviewed and   updated as appropriate: allergies, current medications, past family history, past medical history, past social history, past surgical history, and problem list.    Compared to one year ago, the patient's physical   health is better.  Compared to one year ago, the patient's mental   health is better.    Recent Hospitalizations:  She was not admitted to the hospital during the last year.     Current Medical Providers:  Patient Care Team:  Juan Luis Zafar MD as PCP - General (Family Medicine)  Krunal Garner OD (Optometry)    Outpatient Medications Prior to Visit   Medication Sig Dispense Refill    acetaminophen (TYLENOL) 500 MG tablet Take 2 tablets by mouth 2 (Two) Times a Day.      albuterol sulfate  (90 Base) MCG/ACT inhaler Inhale 2 puffs Every 4 (Four) Hours As Needed for Wheezing. 6.7 g 2    amLODIPine (NORVASC) 10 MG tablet Take 1 tablet by mouth Every Night. 90 tablet 3    cholecalciferol (VITAMIN D3) 1000 units tablet Take 1 tablet by mouth Daily.      cloNIDine (CATAPRES) 0.1 MG tablet Take 1 tablet by mouth Every Morning. 90 tablet 3    cloNIDine (CATAPRES) 0.2 MG tablet Take 1 tablet by mouth Every Night. 90 tablet 3    clotrimazole-betamethasone (LOTRISONE) 1-0.05 % cream Apply 1 Application topically to the appropriate area as directed 2 (Two) Times a Day. 15 g 5    fluticasone (FLONASE) 50 MCG/ACT nasal spray 2 sprays by Each Nare route Daily. 48 g 3    hydrOXYzine (ATARAX) 25 MG tablet TAKE 1 TO 2 TABLETS EVERY 4 TO 6 HOURS AS NEEDED FOR ANXIETY 120 tablet 3    indapamide (LOZOL) 2.5 MG tablet Take 1 tablet by mouth Every Morning. 90 tablet 3    levothyroxine (SYNTHROID, LEVOTHROID) 50 MCG tablet Take 1 tablet by mouth Every Morning. 90 tablet 3    losartan  "(COZAAR) 100 MG tablet TAKE 1 TABLET EVERY MORNING 90 tablet 3    metFORMIN (GLUCOPHAGE) 500 MG tablet Take 1 tablet by mouth 2 (Two) Times a Day With Meals. 180 tablet 3    omeprazole (priLOSEC) 40 MG capsule TAKE 1 CAPSULE DAILY 90 capsule 3    rosuvastatin (CRESTOR) 20 MG tablet Take 1 tablet by mouth Every Night. 90 tablet 3    vitamin B-12 (CYANOCOBALAMIN) 1000 MCG tablet Take 1 tablet by mouth Daily.      azithromycin (ZITHROMAX) 250 MG tablet        No facility-administered medications prior to visit.     No opioid medication identified on active medication list. I have reviewed chart for other potential  high risk medication/s and harmful drug interactions in the elderly.      Aspirin is not on active medication list.  Aspirin use is not indicated based on review of current medical condition/s. Risk of harm outweighs potential benefits.  .    Patient Active Problem List   Diagnosis    Body mass index (BMI) of 25.0 to 29.9    Essential hypertension    Hypertension    Hypothyroid    Hypothyroidism    Seasonal allergic rhinitis     Advance Care Planning Advance Directive is on file.  ACP discussion was declined by the patient. Patient has an advance directive in EMR which is still valid.             Objective   Vitals:    07/24/25 0749   BP: 120/81   BP Location: Left arm   Patient Position: Sitting   Cuff Size: Adult   Pulse: 99   Temp: 98.2 °F (36.8 °C)   TempSrc: Temporal   SpO2: 96%   Weight: 75.8 kg (167 lb 3.2 oz)   Height: 165.1 cm (65\")   PainSc: 0-No pain       Estimated body mass index is 27.82 kg/m² as calculated from the following:    Height as of this encounter: 165.1 cm (65\").    Weight as of this encounter: 75.8 kg (167 lb 3.2 oz).    BMI is >= 25 and <30. (Overweight) The following options were offered after discussion;: exercise counseling/recommendations and nutrition counseling/recommendations           Does the patient have evidence of cognitive impairment? No                                 "                                                                Health  Risk Assessment    Smoking Status:  Social History     Tobacco Use   Smoking Status Never   Smokeless Tobacco Never     Alcohol Consumption:  Social History     Substance and Sexual Activity   Alcohol Use Not Currently    Comment: occ       Fall Risk Screen  STEADI Fall Risk Assessment was completed, and patient is at LOW risk for falls.Assessment completed on:2025    Depression Screening   Little interest or pleasure in doing things? Not at all   Feeling down, depressed, or hopeless? Not at all   PHQ-2 Total Score 0      Health Habits and Functional and Cognitive Screenin/24/2025     7:52 AM   Functional & Cognitive Status   Do you have difficulty preparing food and eating? No   Do you have difficulty bathing yourself, getting dressed or grooming yourself? No   Do you have difficulty using the toilet? No   Do you have difficulty moving around from place to place? No   Do you have trouble with steps or getting out of a bed or a chair? No   Current Diet Well Balanced Diet   Dental Exam Up to date   Eye Exam Up to date   Exercise (times per week) 2 times per week   Current Exercises Include Walking   Do you need help using the phone?  No   Are you deaf or do you have serious difficulty hearing?  No   Do you need help to go to places out of walking distance? No   Do you need help shopping? No   Do you need help preparing meals?  No   Do you need help with housework?  No   Do you need help with laundry? No   Do you need help taking your medications? No   Do you need help managing money? No   Do you ever drive or ride in a car without wearing a seat belt? No   Have you felt unusual fatigue (could be tiredness), stress, anger or loneliness in the last month? No   Who do you live with? Spouse   If you need help, do you have trouble finding someone available to you? No   Have you been bothered in the last four weeks by sexual problems? No    Do you have difficulty concentrating, remembering or making decisions? No           Age-appropriate Screening Schedule:  Refer to the list below for future screening recommendations based on patient's age, sex and/or medical conditions. Orders for these recommended tests are listed in the plan section. The patient has been provided with a written plan.    Health Maintenance List  Health Maintenance   Topic Date Due    TDAP/TD VACCINES (2 - Td or Tdap) 07/16/2023    COVID-19 Vaccine (7 - 2024-25 season) 05/04/2025    COLORECTAL CANCER SCREENING  07/25/2025    INFLUENZA VACCINE  10/01/2025    LIPID PANEL  02/11/2026    ANNUAL WELLNESS VISIT  07/24/2026    MAMMOGRAM  08/07/2026    DXA SCAN  08/07/2026    HEPATITIS C SCREENING  Completed    Pneumococcal Vaccine 50+  Completed    ZOSTER VACCINE  Completed                                                                                                                                                CMS Preventative Services Quick Reference  Risk Factors Identified During Encounter  Fall Risk-High or Moderate: Information on Fall Prevention Shared in After Visit Summary    The above risks/problems have been discussed with the patient.  Pertinent information has been shared with the patient in the After Visit Summary.  An After Visit Summary and PPPS were made available to the patient.    Follow Up:   Next Medicare Wellness visit to be scheduled in 1 year.         Additional E&M Note during same encounter follows:  Patient has additional, significant, and separately identifiable condition(s)/problem(s) that require work above and beyond the Medicare Wellness Visit     Chief Complaint  Medicare Wellness-subsequent (Fasting, pap 2024)    Subjective   75-year-old female for annual Medicare wellness physical      Naina is also being seen today for an annual adult preventative physical exam.     Review of Systems   Respiratory:  Negative for shortness of breath.   "  Cardiovascular:  Negative for chest pain and leg swelling.        Cardiology dismissed patient   Gastrointestinal:         Cologuard reordered   Genitourinary: Negative.    Musculoskeletal: Negative.    All other systems reviewed and are negative.             Objective   Vital Signs:  /81 (BP Location: Left arm, Patient Position: Sitting, Cuff Size: Adult)   Pulse 99   Temp 98.2 °F (36.8 °C) (Temporal)   Ht 165.1 cm (65\")   Wt 75.8 kg (167 lb 3.2 oz)   SpO2 96%   BMI 27.82 kg/m²   Physical Exam  Vitals and nursing note reviewed.   Constitutional:       Appearance: Normal appearance.   HENT:      Right Ear: Tympanic membrane and ear canal normal.      Left Ear: Tympanic membrane and ear canal normal.      Mouth/Throat:      Mouth: Mucous membranes are moist.      Comments: Root abscess-dental-seeing dentist on antibiotics  Eyes:      Extraocular Movements: Extraocular movements intact.      Pupils: Pupils are equal, round, and reactive to light.   Neck:      Vascular: No carotid bruit.   Cardiovascular:      Rate and Rhythm: Normal rate and regular rhythm.      Pulses: Normal pulses.      Heart sounds: Normal heart sounds.   Pulmonary:      Effort: Pulmonary effort is normal.      Breath sounds: Normal breath sounds.      Comments: Breast no masses noted  Abdominal:      General: Abdomen is flat.      Palpations: Abdomen is soft. There is no mass.   Genitourinary:     General: Normal vulva.      Comments: Vulva urethra normal vaginal exam normal cervix present uterus normal size no adnexal  masses Pap smear not taken  Musculoskeletal:      Cervical back: No tenderness.      Right lower leg: No edema.      Left lower leg: No edema.   Lymphadenopathy:      Cervical: No cervical adenopathy.   Skin:     General: Skin is warm and dry.   Neurological:      General: No focal deficit present.      Mental Status: She is alert and oriented to person, place, and time.   Psychiatric:         Mood and Affect: Mood " normal.         Behavior: Behavior normal.         Thought Content: Thought content normal.         Judgment: Judgment normal.         The following data was reviewed by: Juan Luis Zafar MD on 07/24/2025:           Assessment and Plan      Mixed hyperlipidemia       Orders:    Comprehensive Metabolic Panel    Lipid Panel    Hypothyroidism, unspecified type    Orders:    TSH    T4, free    T3, Free    Prediabetes    Orders:    Hemoglobin A1c    Fatigue, unspecified type    Orders:    TSH    T4, free    CBC & Differential    Vitamin D deficiency    Orders:    Vitamin D,25-Hydroxy    Memory loss    Orders:    Vitamin B12    Folate    Encounter for screening mammogram for malignant neoplasm of breast    Orders:    Mammo Screening Digital Tomosynthesis Bilateral With CAD; Future    Medicare annual wellness visit, subsequent         Screening for colorectal cancer    Orders:    Cologuard - Stool, Per Rectum; Future    Plan above-backoff indapamide as needed clonidine dosage       Follow Up   Return in about 6 months (around 1/24/2026), or if symptoms worsen or fail to improve.  Patient was given instructions and counseling regarding her condition or for health maintenance advice. Please see specific information pulled into the AVS if appropriate.

## 2025-07-24 NOTE — PATIENT INSTRUCTIONS
What is the Mediterranean Diet?  The Mediterranean Diet is a way of eating that emphasizes plant-based foods and healthy fats. You focus on overall eating patterns rather than following strict formulas or calculations.    In general, you’ll eat:    Lots of vegetables, fruit, beans, lentils and nuts.  A good amount of whole grains, like whole-wheat bread and brown rice.  Plenty of extra virgin olive oil (EVOO) as a source of healthy fat.  A good amount of fish, especially fish rich in omega-3 fatty acids.  A moderate amount of natural cheese and yogurt.  Little or no red meat, choosing poultry, fish or beans instead of red meat.  Little or no sweets, sugary drinks or butter.  A moderate amount of wine with meals (but if you don’t already drink, don’t start).  This is how people ate in certain [place] countries in the mid-20th century. Researchers have linked these eating patterns with a reduced risk of coronary artery disease (CAD). Today, healthcare providers recommend this eating plan if you have risk factors for heart disease or to support other aspects of your health.    A dietitian can help you modify your approach as needed based on your medical history, underlying conditions, allergies and preferences.        What are the benefits of the Mediterranean Diet?  The mediterranean diet allows you to focus on overall eating patterns rather than following strict formulas or calculations.  The Mediterranean Diet has many benefits, including:    Lowering your risk of cardiovascular disease, including a heart attack or stroke.  Supporting a body weight that’s healthy for you.  Supporting healthy blood sugar levels, blood pressure and cholesterol.  Lowering your risk of metabolic syndrome.  Supporting a healthy balance of gut microbiota (bacteria and other microorganisms) in your digestive system.  Lowering your risk for certain types of cancer.  Slowing the decline of brain function as you age.  Helping you live  longer.        The Mediterranean Diet has these benefits because it:    Limits saturated fat and trans fat. You need some saturated fat, but only in small amounts. Eating too much saturated fat can raise your LDL (bad) cholesterol. A high LDL raises your risk of plaque buildup in your arteries (atherosclerosis). Trans fat has no health benefits. Both of these “unhealthy fats” can cause inflammation.  Encourages healthy unsaturated fats, including omega-3 fatty acids. Unsaturated fats promote healthy cholesterol levels, support brain health and combat inflammation. Plus, a diet high in unsaturated fats and low in saturated fat promotes healthy blood sugar levels.  Limits sodium. Eating foods high in sodium can raise your blood pressure, putting you at a greater risk for a heart attack or stroke.  Limits refined carbohydrates, including sugar. Foods high in refined carbs can cause your blood sugar to spike. Refined carbs also give you excess calories without much nutritional benefit. For example, such foods often have little or no fiber.  Favors foods high in fiber and antioxidants. These nutrients help reduce inflammation throughout your body. Fiber also helps keep waste moving through your large intestine and helps maintain healthy blood sugar levels. Antioxidants protect you against cancer by warding off free radicals.  The Mediterranean Diet includes many different nutrients that work together to help your body. There’s no single food or ingredient responsible for the Mediterranean Diet’s benefits. Instead, the diet is healthy for you because of the combination of nutrients it provides.    Think of a choir with many people singing. One voice alone might carry part of the tune, but you need all the voices to come together to achieve the full effect. Similarly, the Mediterranean Diet works by giving you an ideal blend of nutrients that harmonize to support your health.    Mediterranean Diet food list  The  Mediterranean Diet encourages you to eat plenty of some foods (like whole grains and vegetables) while limiting others. If you’re planning a grocery store trip, you might wonder which foods to buy. Here are some examples of foods to eat often with the Mediterranean Diet.       Advance Care Planning and Advance Directives     You make decisions on a daily basis - decisions about where you want to live, your career, your home, your life. Perhaps one of the most important decisions you face is your choice for future medical care. Take time to talk with your family and your healthcare team and start planning today.  Advance Care Planning is a process that can help you:  Understand possible future healthcare decisions in light of your own experiences  Reflect on those decision in light of your goals and values  Discuss your decisions with those closest to you and the healthcare professionals that care for you  Make a plan by creating a document that reflects your wishes    Surrogate Decision Maker  In the event of a medical emergency, which has left you unable to communicate or to make your own decisions, you would need someone to make decisions for you.  It is important to discuss your preferences for medical treatment with this person while you are in good health.     Qualities of a surrogate decision maker:  Willing to take on this role and responsibility  Knows what you want for future medical care  Willing to follow your wishes even if they don't agree with them  Able to make difficult medical decisions under stressful circumstances    Advance Directives  These are legal documents you can create that will guide your healthcare team and decision maker(s) when needed. These documents can be stored in the electronic medical record.    Living Will - a legal document to guide your care if you have a terminal condition or a serious illness and are unable to communicate. States vary by statute in document names/types, but  most forms may include one or more of the following:        -  Directions regarding life-prolonging treatments        -  Directions regarding artificially provided nutrition/hydration        -  Choosing a healthcare decision maker        -  Direction regarding organ/tissue donation    Durable Power of  for Healthcare - this document names an -in-fact to make medical decisions for you, but it may also allow this person to make personal and financial decisions for you. Please seek the advice of an  if you need this type of document.    **Advance Directives are not required and no one may discriminate against you if you do not sign one.    Medical Orders  Many states allow specific forms/orders signed by your physician to record your wishes for medical treatment in your current state of health. This form, signed in personal communication with your physician, addresses resuscitation and other medical interventions that you may or may not want.      For more information or to schedule a time with a [facility] Advance Care Planning Facilitator contact: Over 40 Females/[facility] or call [phone] and someone will contact you directly.    Medicare Wellness  Personal Prevention Plan of Service     Date of Office Visit:    Encounter Provider:  Juan Luis Zafar MD  Place of Service:  [facility]  Patient Name: Naina Manning  :  1950    As part of the Medicare Wellness portion of your visit today, we are providing you with this personalized preventive plan of services (PPPS). This plan is based upon recommendations of the [place] Preventive Services Task Force (USPSTF) and the Advisory Committee on Immunization Practices (ACIP).    This lists the preventive care services that should be considered, and provides dates of when you are due. Items listed as completed are up-to-date and do not require any further intervention.    Health Maintenance   Topic  Date Due    TDAP/TD VACCINES (2 - Td or Tdap) 07/16/2023    COVID-19 Vaccine (7 - 2024-25 season) 05/04/2025    COLORECTAL CANCER SCREENING  07/25/2025    INFLUENZA VACCINE  10/01/2025    LIPID PANEL  02/11/2026    ANNUAL WELLNESS VISIT  07/24/2026    MAMMOGRAM  08/07/2026    DXA SCAN  08/07/2026    HEPATITIS C SCREENING  Completed    Pneumococcal Vaccine 50+  Completed    ZOSTER VACCINE  Completed       No orders of the defined types were placed in this encounter.      No follow-ups on file.

## 2025-07-25 LAB
25(OH)D3+25(OH)D2 SERPL-MCNC: 53.8 NG/ML (ref 30–100)
ALBUMIN SERPL-MCNC: 4.6 G/DL (ref 3.8–4.8)
ALP SERPL-CCNC: 83 IU/L (ref 44–121)
ALT SERPL-CCNC: 11 IU/L (ref 0–32)
AST SERPL-CCNC: 15 IU/L (ref 0–40)
BASOPHILS # BLD AUTO: 0.1 X10E3/UL (ref 0–0.2)
BASOPHILS NFR BLD AUTO: 1 %
BILIRUB SERPL-MCNC: 0.4 MG/DL (ref 0–1.2)
BUN SERPL-MCNC: 14 MG/DL (ref 8–27)
BUN/CREAT SERPL: 20 (ref 12–28)
CALCIUM SERPL-MCNC: 9.7 MG/DL (ref 8.7–10.3)
CHLORIDE SERPL-SCNC: 98 MMOL/L (ref 96–106)
CHOLEST SERPL-MCNC: 119 MG/DL (ref 100–199)
CO2 SERPL-SCNC: 20 MMOL/L (ref 20–29)
CREAT SERPL-MCNC: 0.71 MG/DL (ref 0.57–1)
EGFRCR SERPLBLD CKD-EPI 2021: 90 ML/MIN/1.73
EOSINOPHIL # BLD AUTO: 0.1 X10E3/UL (ref 0–0.4)
EOSINOPHIL NFR BLD AUTO: 2 %
ERYTHROCYTE [DISTWIDTH] IN BLOOD BY AUTOMATED COUNT: 13 % (ref 11.7–15.4)
FOLATE SERPL-MCNC: 18.5 NG/ML
GLOBULIN SER CALC-MCNC: 2.8 G/DL (ref 1.5–4.5)
GLUCOSE SERPL-MCNC: 103 MG/DL (ref 70–99)
HBA1C MFR BLD: 5.8 % (ref 4.8–5.6)
HCT VFR BLD AUTO: 43.4 % (ref 34–46.6)
HDLC SERPL-MCNC: 44 MG/DL
HGB BLD-MCNC: 13.8 G/DL (ref 11.1–15.9)
IMM GRANULOCYTES # BLD AUTO: 0 X10E3/UL (ref 0–0.1)
IMM GRANULOCYTES NFR BLD AUTO: 0 %
LDLC SERPL CALC-MCNC: 53 MG/DL (ref 0–99)
LYMPHOCYTES # BLD AUTO: 1.9 X10E3/UL (ref 0.7–3.1)
LYMPHOCYTES NFR BLD AUTO: 27 %
MCH RBC QN AUTO: 29.2 PG (ref 26.6–33)
MCHC RBC AUTO-ENTMCNC: 31.8 G/DL (ref 31.5–35.7)
MCV RBC AUTO: 92 FL (ref 79–97)
MONOCYTES # BLD AUTO: 0.4 X10E3/UL (ref 0.1–0.9)
MONOCYTES NFR BLD AUTO: 5 %
NEUTROPHILS # BLD AUTO: 4.4 X10E3/UL (ref 1.4–7)
NEUTROPHILS NFR BLD AUTO: 65 %
PLATELET # BLD AUTO: 436 X10E3/UL (ref 150–450)
POTASSIUM SERPL-SCNC: 4.1 MMOL/L (ref 3.5–5.2)
PROT SERPL-MCNC: 7.4 G/DL (ref 6–8.5)
RBC # BLD AUTO: 4.72 X10E6/UL (ref 3.77–5.28)
SODIUM SERPL-SCNC: 138 MMOL/L (ref 134–144)
T3FREE SERPL-MCNC: 3 PG/ML (ref 2–4.4)
T4 FREE SERPL-MCNC: 1.55 NG/DL (ref 0.82–1.77)
TRIGL SERPL-MCNC: 123 MG/DL (ref 0–149)
TSH SERPL DL<=0.005 MIU/L-ACNC: 1.16 UIU/ML (ref 0.45–4.5)
VIT B12 SERPL-MCNC: 1007 PG/ML (ref 232–1245)
VLDLC SERPL CALC-MCNC: 22 MG/DL (ref 5–40)
WBC # BLD AUTO: 6.9 X10E3/UL (ref 3.4–10.8)

## (undated) DEVICE — SUT VIC RAPD SZ 3/0 27IN PS1 VR935

## (undated) DEVICE — GLV SURG BIOGEL M LTX PF 8

## (undated) DEVICE — BLUNT TIP OPEN SEALER/DIVIDER NANO-COATED: Brand: LIGASURE

## (undated) DEVICE — ENDOPATH XCEL BLADELESS TROCARS WITH STABILITY SLEEVES: Brand: ENDOPATH XCEL

## (undated) DEVICE — ENDOPATH XCEL WITH OPTIVIEW TECHNOLOGY UNIVERSAL TROCAR STABILITY SLEEVES: Brand: ENDOPATH XCEL OPTIVIEW

## (undated) DEVICE — ENDOPATH XCEL WITH OPTIVIEW TECHNOLOGY BLADELESS TROCARS WITH STABILITY SLEEVES: Brand: ENDOPATH XCEL OPTIVIEW

## (undated) DEVICE — GOWN,NON-REINFORCED,SIRUS,SET IN SLV,XXL: Brand: MEDLINE

## (undated) DEVICE — 3M™ STERI-STRIP™ REINFORCED ADHESIVE SKIN CLOSURES, R1547, 1/2 IN X 4 IN (12 MM X 100 MM), 6 STRIPS/ENVELOPE: Brand: 3M™ STERI-STRIP™

## (undated) DEVICE — DRSNG SURESITE WNDW 2.38X2.75

## (undated) DEVICE — SPNG GZ 2S 2X2 8PLY STRL PK/2

## (undated) DEVICE — SYR CONTRL LUERLOK 10CC

## (undated) DEVICE — PK LAP GYN 30

## (undated) DEVICE — UTILITY MARKER W/MED LABELS: Brand: MEDLINE

## (undated) DEVICE — ADHS LIQ MASTISOL 2/3ML

## (undated) DEVICE — PK TURNOVER RM ADV

## (undated) DEVICE — SYR LL TP 10ML STRL

## (undated) DEVICE — CLTH CLENS READYCLEANSE PERI CARE PK/5